# Patient Record
Sex: MALE | Race: WHITE | NOT HISPANIC OR LATINO | Employment: OTHER | URBAN - NONMETROPOLITAN AREA
[De-identification: names, ages, dates, MRNs, and addresses within clinical notes are randomized per-mention and may not be internally consistent; named-entity substitution may affect disease eponyms.]

---

## 2020-10-16 ENCOUNTER — OFFICE VISIT (OUTPATIENT)
Dept: INTERNAL MEDICINE | Facility: CLINIC | Age: 65
End: 2020-10-16

## 2020-10-16 VITALS
OXYGEN SATURATION: 99 % | DIASTOLIC BLOOD PRESSURE: 73 MMHG | SYSTOLIC BLOOD PRESSURE: 158 MMHG | TEMPERATURE: 97.5 F | HEIGHT: 73 IN | WEIGHT: 153.5 LBS | HEART RATE: 84 BPM | RESPIRATION RATE: 18 BRPM | BODY MASS INDEX: 20.34 KG/M2

## 2020-10-16 DIAGNOSIS — R03.0 ELEVATED BLOOD PRESSURE READING: ICD-10-CM

## 2020-10-16 DIAGNOSIS — Z13.29 SCREENING FOR ENDOCRINE, METABOLIC AND IMMUNITY DISORDER: ICD-10-CM

## 2020-10-16 DIAGNOSIS — E10.65 TYPE 1 DIABETES MELLITUS WITH HYPERGLYCEMIA (HCC): ICD-10-CM

## 2020-10-16 DIAGNOSIS — Z12.5 PROSTATE CANCER SCREENING: ICD-10-CM

## 2020-10-16 DIAGNOSIS — Z72.0 TOBACCO ABUSE: ICD-10-CM

## 2020-10-16 DIAGNOSIS — Z00.00 ANNUAL PHYSICAL EXAM: ICD-10-CM

## 2020-10-16 DIAGNOSIS — Z13.228 SCREENING FOR ENDOCRINE, METABOLIC AND IMMUNITY DISORDER: ICD-10-CM

## 2020-10-16 DIAGNOSIS — E78.2 MIXED HYPERLIPIDEMIA: ICD-10-CM

## 2020-10-16 DIAGNOSIS — Z13.0 SCREENING FOR ENDOCRINE, METABOLIC AND IMMUNITY DISORDER: ICD-10-CM

## 2020-10-16 DIAGNOSIS — J45.40 MODERATE PERSISTENT ASTHMA WITHOUT COMPLICATION: ICD-10-CM

## 2020-10-16 DIAGNOSIS — Z76.89 ENCOUNTER TO ESTABLISH CARE: Primary | ICD-10-CM

## 2020-10-16 DIAGNOSIS — Z13.0 SCREENING FOR DISORDER OF BLOOD AND BLOOD-FORMING ORGANS: ICD-10-CM

## 2020-10-16 DIAGNOSIS — Z11.59 ENCOUNTER FOR HEPATITIS C SCREENING TEST FOR LOW RISK PATIENT: ICD-10-CM

## 2020-10-16 DIAGNOSIS — G25.81 RESTLESS LEGS: ICD-10-CM

## 2020-10-16 DIAGNOSIS — E03.8 OTHER SPECIFIED HYPOTHYROIDISM: ICD-10-CM

## 2020-10-16 PROBLEM — E10.9 TYPE 1 DIABETES MELLITUS WITHOUT COMPLICATION (HCC): Status: ACTIVE | Noted: 2020-10-16

## 2020-10-16 PROBLEM — E55.9 VITAMIN D DEFICIENCY: Status: ACTIVE | Noted: 2020-10-16

## 2020-10-16 LAB — HBA1C MFR BLD: 8.9 %

## 2020-10-16 PROCEDURE — 99396 PREV VISIT EST AGE 40-64: CPT | Performed by: NURSE PRACTITIONER

## 2020-10-16 PROCEDURE — 83036 HEMOGLOBIN GLYCOSYLATED A1C: CPT | Performed by: NURSE PRACTITIONER

## 2020-10-16 RX ORDER — INSULIN DETEMIR 100 [IU]/ML
14 INJECTION, SOLUTION SUBCUTANEOUS 2 TIMES DAILY
Qty: 90 ML | Refills: 11 | Status: SHIPPED | OUTPATIENT
Start: 2020-10-16 | End: 2021-02-24 | Stop reason: SDUPTHER

## 2020-10-16 RX ORDER — ATORVASTATIN CALCIUM 10 MG/1
10 TABLET, FILM COATED ORAL NIGHTLY
Qty: 30 TABLET | Refills: 11 | Status: SHIPPED | OUTPATIENT
Start: 2020-10-16 | End: 2021-02-24 | Stop reason: SDUPTHER

## 2020-10-16 RX ORDER — LEVOTHYROXINE SODIUM 0.15 MG/1
150 TABLET ORAL DAILY
Qty: 30 TABLET | Refills: 11 | Status: SHIPPED | OUTPATIENT
Start: 2020-10-16 | End: 2020-10-19 | Stop reason: DRUGHIGH

## 2020-10-16 RX ORDER — INSULIN HUMAN 100 [IU]/ML
INJECTION, SOLUTION PARENTERAL
Qty: 10 EACH | Refills: 11 | Status: SHIPPED | OUTPATIENT
Start: 2020-10-16 | End: 2021-02-24 | Stop reason: SDUPTHER

## 2020-10-16 RX ORDER — INSULIN HUMAN 100 [IU]/ML
INJECTION, SOLUTION PARENTERAL
COMMUNITY
Start: 2020-08-23 | End: 2020-10-16 | Stop reason: SDUPTHER

## 2020-10-16 RX ORDER — BLOOD SUGAR DIAGNOSTIC
STRIP MISCELLANEOUS 4 TIMES DAILY
COMMUNITY
Start: 2020-09-21 | End: 2020-11-02 | Stop reason: SDUPTHER

## 2020-10-16 NOTE — PROGRESS NOTES
Date: 10/16/2020    Name: Rafael Bocanegra  : 1955    Chief Complaint:   Chief Complaint   Patient presents with   • Diabetes     Pt recently moved to [place] from [place] and needs someone to take over his diabetes care. He saw Dr. Donta Rivera in [place]. He checked his sugar about 4 times a day, stating his number go up and down but stays over 200       HPI:  Rafael Bocanegra is a 65 y.o. male presents to [facility] care, obtain annual physical.  He has recently moved from [place], [place].    T1DM:  Checks blood glucose 4 x day before eating.  Reports values have been up and down.  Takes levemir 12 units BID, humulin R SSI at meals. Reports glucose is labile, he has not been able to determine why it fluctuates.  Usually > 200.  Patient denies foot ulcerations, nausea, paresthesia of the feet, polydipsia, polyuria, polyphagia, visual disturbances and weight loss.   Asthma:  Uses albuterol inhaler once a week for wheezing.    Hyperlipidemia: takes Lipitor 10 mg at bedtime.  Does not eat a heart healthy diet, is sedentary.  Denies chest pain, palpitations, lower extremity edema, confusion, headaches, weakness, visual disturbances.   Hypothyroidism: Takes levothyroxine 200 mcg/day. Denies abnormal fatigue, weight change, temperature intolerance, hair/skin/nail changes, bowel habit changes, palpitations, anxiety, sore throat, hoarseness.   RLS: states legs have been jumpy HS for the past few months.  Keeps him awake.  Does not believe symptoms increase w/hyperglycemia    H/O alcoholism: quit drinking in .  Smokes 2 packs a day, for over 40 years.    Eye exam: up to date  Dental exam: edentulous    Diet: typical American diet, sister cooks for him  Physical activity: walks to the store and back, once a week.  About 1/4 mile.    History:    Past Medical History:   Diagnosis Date   • Arthritis    • Diabetes mellitus (CMS/HCC)    • Disease of thyroid gland    • Hyperlipidemia    • Hyperthyroidism        Past Surgical  History:   Procedure Laterality Date   • ARM DEBRIDEMENT      Broken glass removed from the right arm back around 1980s.       Family History   Problem Relation Age of Onset   • Cancer Mother         Cancer in her lymph nodes.    • Thyroid disease Sister        Social History     Socioeconomic History   • Marital status: Single     Spouse name: Not on file   • Number of children: Not on file   • Years of education: Not on file   • Highest education level: Not on file   Tobacco Use   • Smoking status: Current Every Day Smoker     Packs/day: 2.00     Years: 47.00     Pack years: 94.00     Types: Cigarettes   • Smokeless tobacco: Never Used   Substance and Sexual Activity   • Alcohol use: Never     Frequency: Never   • Drug use: Never       No Known Allergies      Current Outpatient Medications:   •  albuterol (PROAIR RESPICLICK) 108 (90 Base) MCG/ACT inhaler, Inhale 2 puffs Every 4 (Four) Hours As Needed for Wheezing or Shortness of Air., Disp: 1 inhaler, Rfl: 11  •  atorvastatin (LIPITOR) 10 MG tablet, Take 1 tablet by mouth Every Night., Disp: 30 tablet, Rfl: 11  •  HumuLIN R 100 UNIT/ML injection, Take 5 units before meals, take 1 unit for each 50 points over normal blood glucose, Disp: 10 each, Rfl: 11  •  insulin detemir (Levemir) 100 UNIT/ML injection, Inject 14 Units under the skin into the appropriate area as directed 2 (Two) Times a Day., Disp: 90 mL, Rfl: 11  •  OneTouch Ultra test strip, 4 (Four) Times a Day. use as directed, Disp: , Rfl:   •  vitamin D (ERGOCALCIFEROL) 1.25 MG (43881 UT) capsule capsule, Take 50,000 Units by mouth 1 (One) Time Per Week., Disp: , Rfl:   •  levothyroxine (Synthroid) 200 MCG tablet, Take 1 tablet by mouth Daily. To be taken when he wakes up, 30 minutes before other medications or eating, Disp: 90 tablet, Rfl: 0    ROS:  Review of Systems   All other systems reviewed and are negative.      VS:  Vitals:    10/16/20 1536   BP: 158/73   Pulse: 84   Resp: 18   Temp: 97.5 °F (36.4  "°C)   TempSrc: Temporal   SpO2: 99%   Weight: 69.6 kg (153 lb 8 oz)   Height: 185.9 cm (73.19\")     Body mass index is 20.15 kg/m².    PE:  Physical Exam  Constitutional:       Appearance: He is well-developed. He is not ill-appearing.   HENT:      Head: Normocephalic.      Right Ear: Tympanic membrane, ear canal and external ear normal.      Left Ear: Tympanic membrane, ear canal and external ear normal.      Nose: Nose normal.      Mouth/Throat:      Mouth: Mucous membranes are moist.      Pharynx: Oropharynx is clear. Uvula midline.   Eyes:      Extraocular Movements: Extraocular movements intact.      Conjunctiva/sclera: Conjunctivae normal.      Pupils: Pupils are equal, round, and reactive to light.   Neck:      Musculoskeletal: Full passive range of motion without pain, normal range of motion and neck supple.      Thyroid: No thyromegaly.      Vascular: No carotid bruit.   Cardiovascular:      Rate and Rhythm: Normal rate and regular rhythm.      Pulses: Normal pulses.      Heart sounds: Normal heart sounds.   Pulmonary:      Effort: Pulmonary effort is normal.      Breath sounds: Rhonchi (scattered) present.   Abdominal:      General: Bowel sounds are normal.      Palpations: Abdomen is soft.      Tenderness: There is no abdominal tenderness.   Musculoskeletal: Normal range of motion.         General: No tenderness or deformity.   Lymphadenopathy:      Cervical: No cervical adenopathy.   Skin:     General: Skin is warm.      Capillary Refill: Capillary refill takes less than 2 seconds.   Neurological:      Mental Status: He is alert and oriented to person, place, and time.      Sensory: No sensory deficit.      Coordination: Coordination normal.      Gait: Gait normal.      Comments: CN II-XII normal   Psychiatric:         Mood and Affect: Mood normal.         Speech: Speech normal.         Behavior: Behavior normal.         Thought Content: Thought content normal.       Assessment/Plan:   1. Healthy male " exam.   2. Patient Counseling: Including but not Limited to the following, when appropriate:  --Nutrition: Stressed importance of moderation in sodium/caffeine intake, saturated fat and cholesterol, caloric balance, sufficient intake of fresh fruits, vegetables, fiber  .--Discussed the issue of daily use of baby aspirin.  --Exercise: Stressed the importance of regular exercise.   --Substance Abuse: Discussed cessation/primary prevention of tobacco, alcohol, or other drug use; driving or other dangerous activities under the influence; availability of treatment for abuse, as indicated based on social history.    --Sexuality: Discussed sexually transmitted diseases, partner selection, use of condoms and contraceptive alternatives.   --Injury prevention: Discussed safety belts, safety helmets, smoke detector, smoking near bedding or upholstery.   --Dental health: Discussed importance of regular tooth brushing, flossing, and dental visits.  --Immunizations reviewed.  --Discussed benefits of colon cancer screening.  3. Discussed the patient's BMI with him.  The BMI is in the acceptable range  4. Return in about 3 months (around 1/16/2021) for Next scheduled follow up.  5. Age-appropriate Screening Scheduled  6. There are no Patient Instructions on file for this visit.    Diagnoses and all orders for this visit:    1. Encounter to establish care (Primary)    2. Annual physical exam    3. Type 1 diabetes mellitus with hyperglycemia (CMS/HCC)  -     POC Glycosylated Hemoglobin (Hb A1C)  -     HumuLIN R 100 UNIT/ML injection; Take 5 units before meals, take 1 unit for each 50 points over normal blood glucose  Dispense: 10 each; Refill: 11  -     insulin detemir (Levemir) 100 UNIT/ML injection; Inject 14 Units under the skin into the appropriate area as directed 2 (Two) Times a Day.  Dispense: 90 mL; Refill: 11  -     MicroAlbumin, Urine, Random - Urine, Clean Catch        - Follow diabetic diet        - Monitor blood sugars  as discussed, to better assess trends and glycemic response to certain food, drink, activities.  Advised fasting blood glucose should be < 130, 2 hours post-prandial should be < 180.  If they are persistently above these values, contact clinic for change to SSI or levemir        - See eye doctor annually or as discussed        - Wear protective foot wear/no bare feet        - Check feet regularly for calluses or ulcers        - Discussed risk of poorly controlled diabetes and long-term complications        - Exercise as tolerated for 30-45 minutes most days of the week. Gradually increase daily exercise if not currently active.        - Take all medications as prescribed    4. Moderate persistent asthma without complication  -     albuterol (PROAIR RESPICLICK) 108 (90 Base) MCG/ACT inhaler; Inhale 2 puffs Every 4 (Four) Hours As Needed for Wheezing or Shortness of Air.  Dispense: 1 inhaler; Refill: 11    5. Mixed hyperlipidemia  -     atorvastatin (LIPITOR) 10 MG tablet; Take 1 tablet by mouth Every Night.  Dispense: 30 tablet; Refill: 11  -     Lipid Panel        - Follow heart healthy diet.  Advised to reduce daily sodium intake to < 1500 mg per day.  Avoid processed & fast foods.        - Exercise as tolerated, with a goal of 30 minutes of moderate exercise most days.     6. Other specified hypothyroidism  -     levothyroxine (SYNTHROID, LEVOTHROID) 150 MCG tablet; Take 1 tablet by mouth Daily.  Dispense: 30 tablet; Refill: 11  -     T4, Free  -     TSH    7. Restless legs  -     Magnesium  - Advised this may be due to hyperglycemia.  Patient to monitor and see if symptoms improve with better glucose control    8. Elevated blood pressure reading        - Follow heart healthy diet.  Advised to reduce daily sodium intake to < 1500 mg per day.  Avoid processed & fast foods.        - Monitor blood pressure as discussed, keep log and bring to next appointment.  Patient states BP is normal at home, when checked.           - Exercise as tolerated, with a goal of 30 minutes of moderate exercise most days.          9. Encounter for hepatitis C screening test for low risk patient  -     Hepatitis C Antibody    10. Screening for disorder of blood and blood-forming organs  -     Comprehensive Metabolic Panel    11. Screening for endocrine, metabolic and immunity disorder  -     CBC & Differential    12. Prostate cancer screening  -     PSA SCREENING    13. Tobacco abuse       - Refused to discuss smoking cessation       Return in about 3 months (around 1/16/2021) for Next scheduled follow up.

## 2020-10-17 LAB
ALBUMIN SERPL-MCNC: 4.3 G/DL (ref 3.8–4.8)
ALBUMIN/GLOB SERPL: 2.2 {RATIO} (ref 1.2–2.2)
ALP SERPL-CCNC: 78 IU/L (ref 39–117)
ALT SERPL-CCNC: 13 IU/L (ref 0–44)
AST SERPL-CCNC: 19 IU/L (ref 0–40)
BASOPHILS # BLD AUTO: 0.1 X10E3/UL (ref 0–0.2)
BASOPHILS NFR BLD AUTO: 1 %
BILIRUB SERPL-MCNC: 0.3 MG/DL (ref 0–1.2)
BUN SERPL-MCNC: 9 MG/DL (ref 8–27)
BUN/CREAT SERPL: 11 (ref 10–24)
CALCIUM SERPL-MCNC: 8.8 MG/DL (ref 8.6–10.2)
CHLORIDE SERPL-SCNC: 89 MMOL/L (ref 96–106)
CHOLEST SERPL-MCNC: 228 MG/DL (ref 100–199)
CO2 SERPL-SCNC: 26 MMOL/L (ref 20–29)
CREAT SERPL-MCNC: 0.82 MG/DL (ref 0.76–1.27)
EOSINOPHIL # BLD AUTO: 0.3 X10E3/UL (ref 0–0.4)
EOSINOPHIL NFR BLD AUTO: 3 %
ERYTHROCYTE [DISTWIDTH] IN BLOOD BY AUTOMATED COUNT: 12.4 % (ref 11.6–15.4)
GLOBULIN SER CALC-MCNC: 2 G/DL (ref 1.5–4.5)
GLUCOSE SERPL-MCNC: 35 MG/DL (ref 65–99)
HCT VFR BLD AUTO: 44.5 % (ref 37.5–51)
HCV AB S/CO SERPL IA: <0.1 S/CO RATIO (ref 0–0.9)
HDLC SERPL-MCNC: 91 MG/DL
HGB BLD-MCNC: 15.3 G/DL (ref 13–17.7)
IMM GRANULOCYTES # BLD AUTO: 0 X10E3/UL (ref 0–0.1)
IMM GRANULOCYTES NFR BLD AUTO: 0 %
LDLC SERPL CALC-MCNC: 127 MG/DL (ref 0–99)
LYMPHOCYTES # BLD AUTO: 4.2 X10E3/UL (ref 0.7–3.1)
LYMPHOCYTES NFR BLD AUTO: 59 %
MAGNESIUM SERPL-MCNC: 1.9 MG/DL (ref 1.6–2.3)
MCH RBC QN AUTO: 31.4 PG (ref 26.6–33)
MCHC RBC AUTO-ENTMCNC: 34.4 G/DL (ref 31.5–35.7)
MCV RBC AUTO: 91 FL (ref 79–97)
MICROALBUMIN UR-MCNC: <3 UG/ML
MONOCYTES # BLD AUTO: 0.4 X10E3/UL (ref 0.1–0.9)
MONOCYTES NFR BLD AUTO: 5 %
NEUTROPHILS # BLD AUTO: 2.4 X10E3/UL (ref 1.4–7)
NEUTROPHILS NFR BLD AUTO: 32 %
PLATELET # BLD AUTO: 258 X10E3/UL (ref 150–450)
POTASSIUM SERPL-SCNC: 3.8 MMOL/L (ref 3.5–5.2)
PROT SERPL-MCNC: 6.3 G/DL (ref 6–8.5)
PSA SERPL-MCNC: 0.3 NG/ML (ref 0–4)
RBC # BLD AUTO: 4.88 X10E6/UL (ref 4.14–5.8)
SODIUM SERPL-SCNC: 127 MMOL/L (ref 134–144)
T4 FREE SERPL-MCNC: 0.11 NG/DL (ref 0.82–1.77)
TRIGL SERPL-MCNC: 56 MG/DL (ref 0–149)
TSH SERPL DL<=0.005 MIU/L-ACNC: 181 UIU/ML (ref 0.45–4.5)
VLDLC SERPL CALC-MCNC: 10 MG/DL (ref 5–40)
WBC # BLD AUTO: 7.4 X10E3/UL (ref 3.4–10.8)

## 2020-10-18 ENCOUNTER — TELEPHONE (OUTPATIENT)
Dept: INTERNAL MEDICINE | Facility: CLINIC | Age: 65
End: 2020-10-18

## 2020-10-18 NOTE — TELEPHONE ENCOUNTER
Tried to contact patient regarding abnormal labs.  No answer, left voicemail advising him labs are abnormal and he should go to the ED.  Will try to call again, later today.

## 2020-10-19 ENCOUNTER — TELEPHONE (OUTPATIENT)
Dept: INTERNAL MEDICINE | Facility: CLINIC | Age: 65
End: 2020-10-19

## 2020-10-19 DIAGNOSIS — E87.1 HYPONATREMIA: Primary | ICD-10-CM

## 2020-10-19 RX ORDER — LEVOTHYROXINE SODIUM 0.2 MG/1
200 TABLET ORAL DAILY
Qty: 90 TABLET | Refills: 0 | Status: SHIPPED | OUTPATIENT
Start: 2020-10-19 | End: 2021-02-24 | Stop reason: SDUPTHER

## 2020-10-19 NOTE — TELEPHONE ENCOUNTER
Talked to patient via phone.  He states he was unaware his glucose was that low.  He has been feeling fine.  No weakness, fatigue, muscle twitching, palpitations, shortness of breath, chest pain, confusion.  His TSH was 181, will increase levothyroxine to 200 mcg a day.  He does not take his medication separately from other medications and food.  Advised he must take it in the morning at least 30 minutes before other medications or food.  Patient verbalized understanding.  He will return to have metabolic panel redrawn.  Verbalized understanding that if he experiences any strange symptoms, weakness, palpitations, changes in personality, lethargy, confusion he should call 911 immediately.  Encouraged to tell his family to help him monitor for the symptoms.  Patient given the opportunity ask questions, verbalized understanding of lab work discussed.

## 2020-11-02 RX ORDER — BLOOD SUGAR DIAGNOSTIC
1 STRIP MISCELLANEOUS 4 TIMES DAILY
Qty: 200 EACH | Refills: 11 | Status: SHIPPED | OUTPATIENT
Start: 2020-11-02 | End: 2021-02-24 | Stop reason: SDUPTHER

## 2020-11-02 NOTE — TELEPHONE ENCOUNTER
Caller: Rafael Bocanegra    Relationship: Self    Best call back number: [phone]  Medication needed:   Requested Prescriptions     Pending Prescriptions Disp Refills   • OneTouch Ultra test strip        Si (Four) Times a Day. use as directed       When do you need the refill by: ASAP    What details did the patient provide when requesting the medication: REQUESTING A LARGER SUPPLY    Does the patient have less than a 3 day supply:  [x] Yes  [] No    What is the patient's preferred pharmacy:      [address] [phone] [facility] [phone]

## 2021-01-18 ENCOUNTER — OFFICE VISIT (OUTPATIENT)
Dept: INTERNAL MEDICINE | Facility: CLINIC | Age: 66
End: 2021-01-18

## 2021-01-18 VITALS
BODY MASS INDEX: 18.55 KG/M2 | WEIGHT: 140 LBS | HEART RATE: 109 BPM | DIASTOLIC BLOOD PRESSURE: 72 MMHG | TEMPERATURE: 97.3 F | OXYGEN SATURATION: 99 % | SYSTOLIC BLOOD PRESSURE: 118 MMHG | HEIGHT: 73 IN

## 2021-01-18 DIAGNOSIS — E03.8 OTHER SPECIFIED HYPOTHYROIDISM: ICD-10-CM

## 2021-01-18 DIAGNOSIS — E10.65 TYPE 1 DIABETES MELLITUS WITH HYPERGLYCEMIA (HCC): Primary | ICD-10-CM

## 2021-01-18 DIAGNOSIS — Z71.6 TOBACCO ABUSE COUNSELING: ICD-10-CM

## 2021-01-18 DIAGNOSIS — Z72.0 TOBACCO ABUSE: ICD-10-CM

## 2021-01-18 PROCEDURE — 99214 OFFICE O/P EST MOD 30 MIN: CPT | Performed by: NURSE PRACTITIONER

## 2021-01-18 PROCEDURE — 99406 BEHAV CHNG SMOKING 3-10 MIN: CPT | Performed by: NURSE PRACTITIONER

## 2021-01-18 RX ORDER — BUPROPION HYDROCHLORIDE 150 MG/1
150 TABLET ORAL DAILY
Qty: 30 TABLET | Refills: 1 | Status: SHIPPED | OUTPATIENT
Start: 2021-01-18 | End: 2021-02-24 | Stop reason: SDUPTHER

## 2021-01-18 NOTE — PROGRESS NOTES
"Date: 2021    Name: Rafael Bocanegra  : 1955    Chief Complaint:   Chief Complaint   Patient presents with   • Follow-up     3 month       HPI:  Rafael Bocanegra is a 66 y.o. male presents for follow up of abnormal thyroid labs, T1DM.  He takes Humulin R sliding scale with meals, Levemir as prescribed.  Reports blood sugars have been normal.  Low-carb diet. Patient denies foot ulcerations, nausea, paresthesia of the feet, polydipsia, polyuria, polyphagia, visual disturbances and weight loss.   Most recent , free T4 0.11.  History of hyperthyroidism, per patient. Denies abnormal fatigue, weight change, temperature intolerance, hair/skin/nail changes, bowel habit changes, palpitations, sore throat, hoarseness, tachycardia, increased appetite, nervousness/anxiety/irritability, tremor, sweating, insomnia.   Would like to quit smoking, currently smokes 1-2 packs a day.     History: The following portions of the patient's history were reviewed and updated as appropriate: allergies, current medications, past medical history, family history, surgical history, social history and problem list.        VS:  Vitals:    21 1723   BP: 118/72   Pulse: 109   Temp: 97.3 °F (36.3 °C)   TempSrc: Infrared   SpO2: 99%   Weight: 63.5 kg (140 lb)   Height: 185.9 cm (73.19\")     Body mass index is 18.37 kg/m².     PE:  Physical Exam  Constitutional:       Appearance: He is not ill-appearing.   HENT:      Head: Normocephalic.      Right Ear: External ear normal.      Left Ear: External ear normal.   Eyes:      Conjunctiva/sclera: Conjunctivae normal.      Pupils: Pupils are equal, round, and reactive to light.   Neck:      Musculoskeletal: Normal range of motion and neck supple.   Cardiovascular:      Rate and Rhythm: Normal rate and regular rhythm.      Pulses: Normal pulses.      Heart sounds: Normal heart sounds.   Pulmonary:      Breath sounds: Normal breath sounds.   Skin:     General: Skin is warm.      Capillary " Refill: Capillary refill takes less than 2 seconds.   Neurological:      Mental Status: He is alert and oriented to person, place, and time.      Coordination: Coordination normal.      Gait: Gait normal.   Psychiatric:         Attention and Perception: Attention normal.         Mood and Affect: Mood and affect normal.         Speech: Speech normal.         Behavior: Behavior normal.         Assessment/Plan:  Diagnoses and all orders for this visit:    1. Type 1 diabetes mellitus with hyperglycemia (CMS/HCC) (Primary)  -     Ambulatory Referral to Endocrinology        - Follow diabetic diet a no        - See eye doctor annually or as discussed        - Wear protective foot wear/no bare feet        - Check feet regularly for calluses or ulcers        - Discussed risk of poorly controlled diabetes and long-term complications        - Exercise as tolerated for 30-45 minutes most days of the week. Gradually increase daily exercise if not currently active.        - Take insulin as prescribed.    2. Other specified hypothyroidism  -     Ambulatory Referral to Endocrinology  - Monitor for palpitations, tachycardia, fever, diaphoresis, difficulty sleeping, anxiety, feeling jittery/anxious, tremors.  Should these occur return to clinic or go to emergency room depending on severity and timing of symptoms  3. Tobacco abuse  -     buPROPion XL (Wellbutrin XL) 150 MG 24 hr tablet; Take 1 tablet by mouth Daily.  Dispense: 30 tablet; Refill: 1  4. Tobacco abuse counseling  -     buPROPion XL (Wellbutrin XL) 150 MG 24 hr tablet; Take 1 tablet by mouth Daily.  Dispense: 30 tablet; Refill: 1  - I advised patient of the risks in continuing to use tobacco, and I provided this patient with smoking cessation educational materials, discussed how to quit smoking and patient has expressed willingness to quit.  I spent 6 minutes counseling patient.   - Encouraged to reach out to support groups, call 1 800 quit now as this has been shown to  increase success with smoking cessation    Return in about 4 weeks (around 2/15/2021) for Next scheduled follow up.

## 2021-02-24 ENCOUNTER — OFFICE VISIT (OUTPATIENT)
Dept: INTERNAL MEDICINE | Facility: CLINIC | Age: 66
End: 2021-02-24

## 2021-02-24 VITALS
SYSTOLIC BLOOD PRESSURE: 118 MMHG | HEIGHT: 73 IN | HEART RATE: 92 BPM | BODY MASS INDEX: 17.6 KG/M2 | WEIGHT: 132.8 LBS | DIASTOLIC BLOOD PRESSURE: 62 MMHG | TEMPERATURE: 98.4 F | OXYGEN SATURATION: 98 %

## 2021-02-24 DIAGNOSIS — Z72.0 TOBACCO ABUSE: ICD-10-CM

## 2021-02-24 DIAGNOSIS — J45.40 MODERATE PERSISTENT ASTHMA WITHOUT COMPLICATION: Primary | ICD-10-CM

## 2021-02-24 DIAGNOSIS — Z71.6 TOBACCO ABUSE COUNSELING: ICD-10-CM

## 2021-02-24 DIAGNOSIS — E78.2 MIXED HYPERLIPIDEMIA: ICD-10-CM

## 2021-02-24 DIAGNOSIS — E10.65 TYPE 1 DIABETES MELLITUS WITH HYPERGLYCEMIA (HCC): ICD-10-CM

## 2021-02-24 DIAGNOSIS — E87.1 HYPONATREMIA: ICD-10-CM

## 2021-02-24 DIAGNOSIS — E03.8 OTHER SPECIFIED HYPOTHYROIDISM: ICD-10-CM

## 2021-02-24 LAB — HBA1C MFR BLD: 8 %

## 2021-02-24 PROCEDURE — 99214 OFFICE O/P EST MOD 30 MIN: CPT | Performed by: NURSE PRACTITIONER

## 2021-02-24 PROCEDURE — 83036 HEMOGLOBIN GLYCOSYLATED A1C: CPT | Performed by: NURSE PRACTITIONER

## 2021-02-24 PROCEDURE — 99406 BEHAV CHNG SMOKING 3-10 MIN: CPT | Performed by: NURSE PRACTITIONER

## 2021-02-24 RX ORDER — BUPROPION HYDROCHLORIDE 150 MG/1
150 TABLET ORAL DAILY
Qty: 30 TABLET | Refills: 1 | Status: SHIPPED | OUTPATIENT
Start: 2021-02-24 | End: 2021-05-25 | Stop reason: SDUPTHER

## 2021-02-24 RX ORDER — INSULIN HUMAN 100 [IU]/ML
INJECTION, SOLUTION PARENTERAL
Qty: 10 EACH | Refills: 11 | Status: SHIPPED | OUTPATIENT
Start: 2021-02-24 | End: 2021-06-29 | Stop reason: SDUPTHER

## 2021-02-24 RX ORDER — BLOOD SUGAR DIAGNOSTIC
1 STRIP MISCELLANEOUS 4 TIMES DAILY
Qty: 200 EACH | Refills: 11 | Status: SHIPPED | OUTPATIENT
Start: 2021-02-24 | End: 2021-05-25 | Stop reason: SDUPTHER

## 2021-02-24 RX ORDER — INSULIN DETEMIR 100 [IU]/ML
14 INJECTION, SOLUTION SUBCUTANEOUS 2 TIMES DAILY
Qty: 90 ML | Refills: 11 | Status: SHIPPED | OUTPATIENT
Start: 2021-02-24 | End: 2021-05-25 | Stop reason: SDUPTHER

## 2021-02-24 RX ORDER — ATORVASTATIN CALCIUM 10 MG/1
10 TABLET, FILM COATED ORAL NIGHTLY
Qty: 30 TABLET | Refills: 11 | Status: SHIPPED | OUTPATIENT
Start: 2021-02-24 | End: 2021-12-22 | Stop reason: SDUPTHER

## 2021-02-24 RX ORDER — LEVOTHYROXINE SODIUM 0.2 MG/1
200 TABLET ORAL DAILY
Qty: 90 TABLET | Refills: 0 | Status: SHIPPED | OUTPATIENT
Start: 2021-02-24 | End: 2021-07-16 | Stop reason: DRUGHIGH

## 2021-02-24 NOTE — PROGRESS NOTES
Date: 2021    Name: Rafael Bocanegra  : 1955    Chief Complaint:   Chief Complaint   Patient presents with   • Med Refill     1 month follow up       HPI:  Rafael Bocanegra is a 66 y.o. male presents for follow up of several chronic conditions, including T1DM, HL, hypothyroid, asthma.  He expresses interest in smoking cessation, as well. He lives with his sister and nephew.  They stay at home, except to go to the grocery for necessary items.  Appointment scheduled in April w/endocrinology. His address is incorrect in his demographics, has not received new patient paperwork.       T1DM:  Takes levemir 14 units BID, humulin R 5 units/SSI with meals as prescribed.  Checks glucose in the morning and before eating.  Reports glucose is normally 100-250, but occasionally feels shaky, sweaty and eats some peanut butter which improves hypoglycemic symptoms.  Has not been able to determine specific food or beverage that causes increased blood glucose. He occasionally has numbness and tingling after extended periods of walking, below the knee in both legs.  This does not bother him.  He has seen podiatry in the past, but not in the past year. Patient denies foot ulcerations, nausea, polydipsia, polyuria, polyphagia, visual disturbances.  He has lost weight without change in diet.  Reports this has been due to thyroid imbalances in the past.  Last A1c 8.9, in 2020.      Hypothyroid: takes levothyroxine 200 mcg daily, 30 minutes prior to other medications and eating.  He is always cold, has been more cold than others as long as he can remember.  Denies abnormal fatigue, hair/skin/nail changes, bowel habit changes, palpitations, anxiety, sore throat, hoarseness.       HL: takes atorvastatin 10 mg daily.  Does not eat a heart healthy diet.  No formal exercise program.  Denies chest pain, dyspnea, orthopnea, palpitations, lower extremity edema, confusion, headaches, weakness, visual disturbances.    Smoking,  "asthma:  Continues to smoke 1-1.5 ppd.  He expresses a desire to quit.  Has been prescribed wellbutrin in the past, has not taken it.  He uses albuterol inhaler 1-2 x a week.  No cough.     History: The following portions of the patient's history were reviewed and updated as appropriate: allergies, current medications, past medical history, family history, surgical history, social history and problem list.      VS:  Vitals:    02/24/21 1015   BP: 118/62   Pulse: 92   Temp: 98.4 °F (36.9 °C)   TempSrc: Infrared   SpO2: 98%   Weight: 60.2 kg (132 lb 12.8 oz)   Height: 185.9 cm (73.19\")     Body mass index is 17.43 kg/m².  PE:  Physical Exam  Constitutional:       Appearance: He is not ill-appearing.   HENT:      Head: Normocephalic.      Right Ear: External ear normal.      Left Ear: External ear normal.   Eyes:      Conjunctiva/sclera: Conjunctivae normal.      Pupils: Pupils are equal, round, and reactive to light.   Neck:      Musculoskeletal: Normal range of motion and neck supple.   Cardiovascular:      Rate and Rhythm: Normal rate and regular rhythm.      Pulses: Normal pulses.      Heart sounds: Normal heart sounds.   Pulmonary:      Effort: Pulmonary effort is normal.      Breath sounds: Normal breath sounds.   Skin:     General: Skin is warm.      Capillary Refill: Capillary refill takes less than 2 seconds.   Neurological:      Mental Status: He is alert and oriented to person, place, and time.      Coordination: Coordination normal.      Gait: Gait normal.      Comments: Intention tremors   Psychiatric:         Attention and Perception: Attention normal.         Mood and Affect: Mood and affect normal.         Speech: Speech normal.         Behavior: Behavior normal.       Office Visit on 02/24/2021   Component Date Value Ref Range Status   • Hemoglobin A1C 02/24/2021 8.0  % Final        Assessment/Plan:  Diagnoses and all orders for this visit:    1. Moderate persistent asthma without complication " (Primary)         -  Continue to use albuterol inhaler, prn SOA, wheezing          -  If frequency of use increases, will contact clinic or return to clinic for further evaluation.  2. Type 1 diabetes mellitus with hyperglycemia (CMS/[facility])  -     insulin detemir (Levemir) 100 UNIT/ML injection; Inject 14 Units under the skin into the appropriate area as directed 2 (Two) Times a Day.  Dispense: 90 mL; Refill: 11  -     HumuLIN R 100 UNIT/ML injection; Take 5 units before meals, take 1 unit for each 50 points over normal blood glucose  Dispense: 10 each; Refill: 11  -     OneTouch Ultra test strip; 1 each by Other route 4 (Four) Times a Day. use as directed  Dispense: 200 each; Refill: 11  -     POC Glycosylated Hemoglobin (Hb A1C)  -     Ambulatory Referral to Podiatry  -     Ambulatory Referral for Diabetic Eye Exam-Ophthalmology. Advised patient he will have to make his own appointment, dilated eye exam should be done once a year.  - Keep appointment with endocrinology.  Given phone number to call and ask that new patient packet be sent to correct address.            - Follow diabetic diet        - Continue to monitor glucose.  Fasting should be < 130.  Advised to keep record of blood glucose to take to endocrinology.          - Wear protective foot wear/no bare feet        - Check feet regularly for calluses or ulcers        - Discussed risk of poorly controlled diabetes and long-term complications        - Exercise as tolerated for 30-45 minutes most days of the week. Gradually increase daily exercise if not currently active.        - Take all medications as prescribed  3. Tobacco abuse  -     buPROPion XL (Wellbutrin XL) 150 MG 24 hr tablet; Take 1 tablet by mouth Daily.  Dispense: 30 tablet; Refill: 1.   Start taking 2 weeks prior to quit date.  He plans to quit in the spring, as he will be able to go fishing to help him relax/not want to smoke.   4. Tobacco abuse counseling  -     buPROPion XL (Wellbutrin XL)  150 MG 24 hr tablet; Take 1 tablet by mouth Daily.  Dispense: 30 tablet; Refill: 1  - I advised patient of the risks in continuing to use tobacco, and I provided this patient with smoking cessation educational materials, discussed how to quit smoking and patient has expressed willingness to quit.  I spent 6 minutes counseling patient.    5. Mixed hyperlipidemia  -     atorvastatin (LIPITOR) 10 MG tablet; Take 1 tablet by mouth Every Night.  Dispense: 30 tablet; Refill: 11        - Follow heart healthy diet.  Avoid processed & fast foods.  6. Hyponatremia  -     Comprehensive Metabolic Panel  7. Other specified hypothyroidism  -     T4, Free  -     TSH  -     levothyroxine (Synthroid) 200 MCG tablet; Take 1 tablet by mouth Daily. To be taken when he wakes up, 30 minutes before other medications or eating  Dispense: 90 tablet; Refill: 0        Return in about 3 months (around 5/24/2021) for Next scheduled follow up.

## 2021-02-25 LAB
ALBUMIN SERPL-MCNC: 4.3 G/DL (ref 3.5–5.2)
ALBUMIN/GLOB SERPL: 2.7 G/DL
ALP SERPL-CCNC: 82 U/L (ref 39–117)
ALT SERPL-CCNC: 9 U/L (ref 1–41)
AST SERPL-CCNC: 13 U/L (ref 1–40)
BILIRUB SERPL-MCNC: 0.3 MG/DL (ref 0–1.2)
BUN SERPL-MCNC: 8 MG/DL (ref 8–23)
BUN/CREAT SERPL: 12.5 (ref 7–25)
CALCIUM SERPL-MCNC: 9.5 MG/DL (ref 8.6–10.5)
CHLORIDE SERPL-SCNC: 94 MMOL/L (ref 98–107)
CO2 SERPL-SCNC: 27.8 MMOL/L (ref 22–29)
CREAT SERPL-MCNC: 0.64 MG/DL (ref 0.76–1.27)
GLOBULIN SER CALC-MCNC: 1.6 GM/DL
GLUCOSE SERPL-MCNC: 289 MG/DL (ref 65–99)
POTASSIUM SERPL-SCNC: 5.2 MMOL/L (ref 3.5–5.2)
PROT SERPL-MCNC: 5.9 G/DL (ref 6–8.5)
SODIUM SERPL-SCNC: 131 MMOL/L (ref 136–145)
T4 FREE SERPL-MCNC: 0.82 NG/DL (ref 0.93–1.7)
TSH SERPL DL<=0.005 MIU/L-ACNC: 0.06 UIU/ML (ref 0.27–4.2)

## 2021-03-03 ENCOUNTER — PRIOR AUTHORIZATION (OUTPATIENT)
Dept: INTERNAL MEDICINE | Facility: CLINIC | Age: 66
End: 2021-03-03

## 2021-03-03 DIAGNOSIS — J45.40 MODERATE PERSISTENT ASTHMA WITHOUT COMPLICATION: ICD-10-CM

## 2021-03-08 DIAGNOSIS — J45.40 MODERATE PERSISTENT ASTHMA WITHOUT COMPLICATION: Primary | ICD-10-CM

## 2021-03-08 RX ORDER — ALBUTEROL SULFATE 90 UG/1
2 AEROSOL, METERED RESPIRATORY (INHALATION) EVERY 4 HOURS PRN
Qty: 18 G | Refills: 11 | Status: SHIPPED | OUTPATIENT
Start: 2021-03-08 | End: 2022-08-24 | Stop reason: SDUPTHER

## 2021-05-25 ENCOUNTER — OFFICE VISIT (OUTPATIENT)
Dept: INTERNAL MEDICINE | Facility: CLINIC | Age: 66
End: 2021-05-25

## 2021-05-25 VITALS
DIASTOLIC BLOOD PRESSURE: 49 MMHG | WEIGHT: 128.9 LBS | OXYGEN SATURATION: 100 % | SYSTOLIC BLOOD PRESSURE: 98 MMHG | HEIGHT: 73 IN | BODY MASS INDEX: 17.08 KG/M2 | HEART RATE: 85 BPM | TEMPERATURE: 97.5 F

## 2021-05-25 DIAGNOSIS — E87.1 HYPONATREMIA: ICD-10-CM

## 2021-05-25 DIAGNOSIS — J45.40 MODERATE PERSISTENT ASTHMA WITHOUT COMPLICATION: ICD-10-CM

## 2021-05-25 DIAGNOSIS — Z71.6 TOBACCO ABUSE COUNSELING: ICD-10-CM

## 2021-05-25 DIAGNOSIS — E10.65 TYPE 1 DIABETES MELLITUS WITH HYPERGLYCEMIA (HCC): Primary | ICD-10-CM

## 2021-05-25 DIAGNOSIS — E03.8 OTHER SPECIFIED HYPOTHYROIDISM: ICD-10-CM

## 2021-05-25 DIAGNOSIS — Z72.0 TOBACCO ABUSE: ICD-10-CM

## 2021-05-25 PROCEDURE — 99214 OFFICE O/P EST MOD 30 MIN: CPT | Performed by: NURSE PRACTITIONER

## 2021-05-25 PROCEDURE — 99406 BEHAV CHNG SMOKING 3-10 MIN: CPT | Performed by: NURSE PRACTITIONER

## 2021-05-25 RX ORDER — BLOOD SUGAR DIAGNOSTIC
1 STRIP MISCELLANEOUS 4 TIMES DAILY
Qty: 200 EACH | Refills: 11 | Status: SHIPPED | OUTPATIENT
Start: 2021-05-25 | End: 2022-05-02 | Stop reason: SDUPTHER

## 2021-05-25 RX ORDER — INSULIN DETEMIR 100 [IU]/ML
16 INJECTION, SOLUTION SUBCUTANEOUS 2 TIMES DAILY
Qty: 90 ML | Refills: 11 | Status: SHIPPED | OUTPATIENT
Start: 2021-05-25 | End: 2021-06-29 | Stop reason: SDUPTHER

## 2021-05-25 RX ORDER — BUPROPION HYDROCHLORIDE 150 MG/1
150 TABLET ORAL DAILY
Qty: 30 TABLET | Refills: 1 | Status: SHIPPED | OUTPATIENT
Start: 2021-05-25 | End: 2021-09-03

## 2021-05-25 NOTE — PROGRESS NOTES
"Date: 2021    Name: Rafael Bocanegra  : 1955    Chief Complaint:   Chief Complaint   Patient presents with   • Med Refill     3 month f/u       HPI:  Rafael Bocanegra is a 66 y.o. male presents with requests for medication refills.  He was referred to endocrinology, missed appointment.  Says he lost the paperwork.  He continues to take SSI with meals, detemir 14 units BID.  Reports he has noted high blood sugars, usually in the 200's; once > 400.  He has lost weight since last visit.  Endorses good appetite, eats 3 meals a day.  Does admit meals are not always low carb, heart healthy.  No changes in bowel habit.  He has hypothyroidism, not currently taking levothyroxine.  Last labs drawn 3 months ago, A1c 8, TSH 00.59. CMP abnormal with low creatinine, sodium, protein, chloride.  Patient denies foot ulcerations, nausea, paresthesia of the feet, polydipsia, polyuria, polyphagia, visual disturbances, abnormal fatigue, temperature intolerance, hair/skin/nail changes, bowel habit changes, palpitations, anxiety, sore throat, hoarseness. Has seen podiatrist, who advised he does not have neuropathy in his feet.  He has diabetic shoes, not wearing them today.  He is \"breaking them in\".    Continue to smoke, usually > 1ppd.  Has not taken bupropion  mg, as prescribed for smoking cessation.  Would like to start taking it now.  He has not used albuterol inhaler in over a month.      History:  The following portions of the patient's history were reviewed and updated as appropriate: allergies, current medications, past medical history, family history, surgical history, social history and problem list.     VS:  Vitals:    21 0859   BP: 98/49   Pulse: 85   Temp: 97.5 °F (36.4 °C)   TempSrc: Infrared   SpO2: 100%   Weight: 58.5 kg (128 lb 14.4 oz)   Height: 185.9 cm (73.19\")     Body mass index is 16.92 kg/m².    PE:  Physical Exam  Constitutional:       Appearance: He is underweight.   HENT:      Head: " Normocephalic.      Right Ear: External ear normal.      Left Ear: External ear normal.   Eyes:      Conjunctiva/sclera: Conjunctivae normal.      Pupils: Pupils are equal, round, and reactive to light.   Neck:      Thyroid: No thyroid mass, thyromegaly or thyroid tenderness.   Cardiovascular:      Rate and Rhythm: Normal rate and regular rhythm.      Pulses: Normal pulses.      Heart sounds: Normal heart sounds.   Pulmonary:      Effort: Pulmonary effort is normal.      Breath sounds: Normal breath sounds.   Musculoskeletal:      Cervical back: Normal range of motion and neck supple.   Skin:     General: Skin is warm.      Capillary Refill: Capillary refill takes less than 2 seconds.   Neurological:      Mental Status: He is alert and oriented to person, place, and time.      Coordination: Coordination normal.      Gait: Gait normal.   Psychiatric:         Attention and Perception: Attention normal.         Mood and Affect: Mood and affect normal.         Speech: Speech normal.         Behavior: Behavior normal.       Assessment/Plan:  Diagnoses and all orders for this visit:    1. Type 1 diabetes mellitus with hyperglycemia (CMS/[facility]) (Primary)  -     OneTouch Ultra test strip; 1 each by Other route 4 (Four) Times a Day. E11.9 provide what is covered by ins  Dispense: 200 each; Refill: 11  -     insulin detemir (Levemir) 100 UNIT/ML injection; Inject 16 Units under the skin into the appropriate area as directed 2 (Two) Times a Day.  Dispense: 90 mL; Refill: 11  -     Hemoglobin A1c  - Given phone number, address of endocrinologist to make an appointment.  He is going to call today to make appointment.         - Follow diabetic diet        - Continue to monitor blood glucose, take meter/readings to endocrinology appointment        - See eye doctor annually or as discussed        - Wear protective foot wear/no bare feet        - Check feet regularly for calluses or ulcers        - Discussed risk of poorly controlled  diabetes and long-term complications        - Exercise as tolerated for 30-45 minutes most days of the week. Gradually increase daily exercise if not currently active.        - Take all medications as prescribed    2. Other specified hypothyroidism  -     TSH.   - To call endocrinology to schedule appointment    3. Tobacco abuse  -     buPROPion XL (Wellbutrin XL) 150 MG 24 hr tablet; Take 1 tablet by mouth Daily.  Dispense: 30 tablet; Refill: 1. Advised this can cause weight loss.  He verbalizes understanding, agrees to stop taking if he loses more weight.      4. Tobacco abuse counseling  -     buPROPion XL (Wellbutrin XL) 150 MG 24 hr tablet; Take 1 tablet by mouth Daily.  Dispense: 30 tablet; Refill: 1  - I advised patient of the risks in continuing to use tobacco, and I provided this patient with smoking cessation educational materials, discussed how to quit smoking and patient has expressed willingness to quit.  I spent 4 minutes counseling patient.      5. Moderate persistent asthma without complication        - Continue using albuterol inhaler as prescribed    6. Hyponatremia  -     Comprehensive Metabolic Panel    7. Weight loss        - TSH, A1c, stop smoking.       Return in about 5 months (around 10/25/2021) for Annual.

## 2021-05-26 LAB
ALBUMIN SERPL-MCNC: 4.1 G/DL (ref 3.5–5.2)
ALBUMIN/GLOB SERPL: 2.6 G/DL
ALP SERPL-CCNC: 90 U/L (ref 39–117)
ALT SERPL-CCNC: 7 U/L (ref 1–41)
AST SERPL-CCNC: 10 U/L (ref 1–40)
BILIRUB SERPL-MCNC: 0.4 MG/DL (ref 0–1.2)
BUN SERPL-MCNC: 4 MG/DL (ref 8–23)
BUN/CREAT SERPL: 5.9 (ref 7–25)
CALCIUM SERPL-MCNC: 9.4 MG/DL (ref 8.6–10.5)
CHLORIDE SERPL-SCNC: 95 MMOL/L (ref 98–107)
CO2 SERPL-SCNC: 27.4 MMOL/L (ref 22–29)
CREAT SERPL-MCNC: 0.68 MG/DL (ref 0.76–1.27)
GLOBULIN SER CALC-MCNC: 1.6 GM/DL
GLUCOSE SERPL-MCNC: 328 MG/DL (ref 65–99)
HBA1C MFR BLD: 8.5 % (ref 4.8–5.6)
POTASSIUM SERPL-SCNC: 4.9 MMOL/L (ref 3.5–5.2)
PROT SERPL-MCNC: 5.7 G/DL (ref 6–8.5)
SODIUM SERPL-SCNC: 131 MMOL/L (ref 136–145)
TSH SERPL DL<=0.005 MIU/L-ACNC: 0.01 UIU/ML (ref 0.27–4.2)

## 2021-06-29 DIAGNOSIS — E10.65 TYPE 1 DIABETES MELLITUS WITH HYPERGLYCEMIA (HCC): ICD-10-CM

## 2021-06-29 RX ORDER — INSULIN HUMAN 100 [IU]/ML
INJECTION, SOLUTION PARENTERAL
Qty: 10 EACH | Refills: 11 | Status: SHIPPED | OUTPATIENT
Start: 2021-06-29 | End: 2021-09-02 | Stop reason: SDUPTHER

## 2021-06-29 RX ORDER — INSULIN DETEMIR 100 [IU]/ML
16 INJECTION, SOLUTION SUBCUTANEOUS 2 TIMES DAILY
Qty: 90 ML | Refills: 11 | Status: SHIPPED | OUTPATIENT
Start: 2021-06-29 | End: 2021-09-02

## 2021-06-29 NOTE — TELEPHONE ENCOUNTER
Caller: Rafael Bocanegra    Relationship: Self    Best call back number: [phone]  Medication needed:   Requested Prescriptions     Pending Prescriptions Disp Refills   • HumuLIN R 100 UNIT/ML injection 10 each 11     Sig: Take 5 units before meals, take 1 unit for each 50 points over normal blood glucose   • insulin detemir (Levemir) 100 UNIT/ML injection 90 mL 11     Sig: Inject 16 Units under the skin into the appropriate area as directed 2 (Two) Times a Day.       When do you need the refill by: ASAP  What additional details did the patient provide when requesting the medication: N/A    Does the patient have less than a 3 day supply:  [x] Yes  [] No    What is the patient's preferred pharmacy: [facility] [address] [place] [address] [phone] [facility] [phone]

## 2021-07-15 NOTE — TELEPHONE ENCOUNTER
Last visit:5/25/2021  Next appointment:  Abnormal TSH in may 2021  appt with endocrinology 09/02/2021

## 2021-07-16 RX ORDER — LEVOTHYROXINE SODIUM 0.2 MG/1
TABLET ORAL
Qty: 90 TABLET | Refills: 0 | OUTPATIENT
Start: 2021-07-16

## 2021-07-16 RX ORDER — LEVOTHYROXINE SODIUM 0.15 MG/1
150 TABLET ORAL DAILY
Qty: 30 TABLET | Refills: 1 | Status: SHIPPED | OUTPATIENT
Start: 2021-07-16 | End: 2021-09-07 | Stop reason: DRUGHIGH

## 2021-07-16 NOTE — TELEPHONE ENCOUNTER
Dose needs to be adjusted.  Sending levothyroxine 150 mcg daily, same administration instructions.

## 2021-09-02 ENCOUNTER — OFFICE VISIT (OUTPATIENT)
Dept: DIABETES SERVICES | Facility: HOSPITAL | Age: 66
End: 2021-09-02

## 2021-09-02 ENCOUNTER — LAB (OUTPATIENT)
Dept: LAB | Facility: HOSPITAL | Age: 66
End: 2021-09-02

## 2021-09-02 ENCOUNTER — OFFICE VISIT (OUTPATIENT)
Dept: ENDOCRINOLOGY | Facility: CLINIC | Age: 66
End: 2021-09-02

## 2021-09-02 VITALS
DIASTOLIC BLOOD PRESSURE: 60 MMHG | WEIGHT: 135 LBS | OXYGEN SATURATION: 98 % | HEART RATE: 80 BPM | SYSTOLIC BLOOD PRESSURE: 122 MMHG | HEIGHT: 73 IN | BODY MASS INDEX: 17.89 KG/M2

## 2021-09-02 DIAGNOSIS — E10.65 TYPE 1 DIABETES MELLITUS WITH HYPERGLYCEMIA (HCC): ICD-10-CM

## 2021-09-02 DIAGNOSIS — E03.9 ACQUIRED HYPOTHYROIDISM: ICD-10-CM

## 2021-09-02 DIAGNOSIS — E10.65 UNCONTROLLED TYPE 1 DIABETES MELLITUS WITH HYPERGLYCEMIA (HCC): Primary | ICD-10-CM

## 2021-09-02 DIAGNOSIS — E10.65 UNCONTROLLED TYPE 1 DIABETES MELLITUS WITH HYPERGLYCEMIA (HCC): ICD-10-CM

## 2021-09-02 LAB
ALBUMIN SERPL-MCNC: 4.5 G/DL (ref 3.5–5.2)
ALBUMIN UR-MCNC: <1.2 MG/DL
ALBUMIN/GLOB SERPL: 2 G/DL
ALP SERPL-CCNC: 105 U/L (ref 39–117)
ALT SERPL W P-5'-P-CCNC: 28 U/L (ref 1–41)
ANION GAP SERPL CALCULATED.3IONS-SCNC: 7.5 MMOL/L (ref 5–15)
AST SERPL-CCNC: 24 U/L (ref 1–40)
BILIRUB SERPL-MCNC: 0.3 MG/DL (ref 0–1.2)
BUN SERPL-MCNC: 5 MG/DL (ref 8–23)
BUN/CREAT SERPL: 6.1 (ref 7–25)
CALCIUM SPEC-SCNC: 9.3 MG/DL (ref 8.6–10.5)
CHLORIDE SERPL-SCNC: 90 MMOL/L (ref 98–107)
CHOLEST SERPL-MCNC: 135 MG/DL (ref 0–200)
CO2 SERPL-SCNC: 30.5 MMOL/L (ref 22–29)
CREAT SERPL-MCNC: 0.82 MG/DL (ref 0.76–1.27)
CREAT UR-MCNC: 31 MG/DL
EXPIRATION DATE: ABNORMAL
EXPIRATION DATE: NORMAL
GFR SERPL CREATININE-BSD FRML MDRD: 95 ML/MIN/1.73
GLOBULIN UR ELPH-MCNC: 2.2 GM/DL
GLUCOSE BLDC GLUCOMTR-MCNC: 235 MG/DL (ref 70–130)
GLUCOSE SERPL-MCNC: 178 MG/DL (ref 65–99)
HBA1C MFR BLD: 8.8 %
HDLC SERPL-MCNC: 67 MG/DL (ref 40–60)
LDLC SERPL CALC-MCNC: 58 MG/DL (ref 0–100)
LDLC/HDLC SERPL: 0.88 {RATIO}
Lab: ABNORMAL
Lab: NORMAL
MICROALBUMIN/CREAT UR: NORMAL MG/G{CREAT}
POTASSIUM SERPL-SCNC: 4.4 MMOL/L (ref 3.5–5.2)
PROT SERPL-MCNC: 6.7 G/DL (ref 6–8.5)
SODIUM SERPL-SCNC: 128 MMOL/L (ref 136–145)
TRIGL SERPL-MCNC: 44 MG/DL (ref 0–150)
TSH SERPL DL<=0.05 MIU/L-ACNC: 45.7 UIU/ML (ref 0.27–4.2)
VLDLC SERPL-MCNC: 10 MG/DL (ref 5–40)

## 2021-09-02 PROCEDURE — 99204 OFFICE O/P NEW MOD 45 MIN: CPT | Performed by: INTERNAL MEDICINE

## 2021-09-02 PROCEDURE — 84443 ASSAY THYROID STIM HORMONE: CPT

## 2021-09-02 PROCEDURE — 82570 ASSAY OF URINE CREATININE: CPT

## 2021-09-02 PROCEDURE — 82947 ASSAY GLUCOSE BLOOD QUANT: CPT | Performed by: INTERNAL MEDICINE

## 2021-09-02 PROCEDURE — G0108 DIAB MANAGE TRN  PER INDIV: HCPCS | Performed by: REGISTERED NURSE

## 2021-09-02 PROCEDURE — 80061 LIPID PANEL: CPT

## 2021-09-02 PROCEDURE — 80053 COMPREHEN METABOLIC PANEL: CPT

## 2021-09-02 PROCEDURE — 82043 UR ALBUMIN QUANTITATIVE: CPT

## 2021-09-02 RX ORDER — INSULIN DETEMIR 100 [IU]/ML
14 INJECTION, SOLUTION SUBCUTANEOUS 2 TIMES DAILY
Qty: 30 ML | Refills: 3 | Status: SHIPPED | OUTPATIENT
Start: 2021-09-02 | End: 2022-01-14 | Stop reason: SDUPTHER

## 2021-09-02 RX ORDER — ASPIRIN 81 MG/1
81 TABLET ORAL DAILY
COMMUNITY
End: 2022-08-24 | Stop reason: SDUPTHER

## 2021-09-02 RX ORDER — INSULIN HUMAN 100 [IU]/ML
INJECTION, SOLUTION PARENTERAL
Qty: 30 ML | Refills: 3 | Status: SHIPPED | OUTPATIENT
Start: 2021-09-02 | End: 2022-08-24

## 2021-09-02 NOTE — ASSESSMENT & PLAN NOTE
Diabetes is worsening.   Take R insulin before meals based on CHO counting instead of sliding scale.  Diabetes will be reassessed in 3 months.    Refer for DM education.  Check labs today.

## 2021-09-02 NOTE — PROGRESS NOTES
Office Note      Date: 2021  Patient Name: Rafael Bocanegra  MRN: [id number]  : 1955    Chief Complaint   Patient presents with   • Diabetes   • Hypothyroidism       History of Present Illness:   Rafael Bocanegra is a 66 y.o. male who presents for Diabetes type 1. Diagnosed in: . Treated in past with insulin. Current treatments: basal bolus insulin. Number of insulin shots per day: 4. Checks blood sugar >4 times a day. Has low blood sugar: frequent. Aspirin use: Yes. Statin use: Yes. ACE-I/ARB use: No -  .  Last eye exam:     He has h/o hypothyrodism.  TSH was 181 last .  He is currently on T4 150mcg qd.  He had TSH done 2021 that was low at 0.015.  He is taking the T4 correctly.  He isn't taking any interfering meds concurrently.  He denies any goiter.  He hasn't noted any change in the size of his neck.  He denies any compressive sxs.  He notes weight loss.  He notes some nervouseness.    Subjective      Diabetic Complications:  Eyes: No  Kidneys: No  Feet: No  Heart: No    Diet and Exercise:  Meals per day: 3  Minutes of exercise per week: 0 mins.    Review of Systems:   Review of Systems   Constitutional: Positive for unexpected weight change.   HENT: Negative.    Eyes: Negative.    Respiratory: Positive for cough.    Cardiovascular: Negative.    Gastrointestinal: Negative.    Endocrine: Positive for polyphagia and polyuria.   Genitourinary: Negative.    Musculoskeletal: Positive for back pain and gait problem.   Skin: Negative.    Allergic/Immunologic: Negative.    Neurological: Positive for light-headedness and numbness.   Hematological: Negative.    Psychiatric/Behavioral: The patient is nervous/anxious.        The following portions of the patient's history were reviewed and updated as appropriate: allergies, current medications, past family history, past medical history, past social history, past surgical history and problem list.    Objective     Visit Vitals  /60   Pulse  "80   Ht 185.9 cm (73.19\")   Wt 61.2 kg (135 lb)   SpO2 98%   BMI 17.72 kg/m²       Physical Exam:  Physical Exam  Constitutional:       Appearance: Normal appearance.   HENT:      Head: Normocephalic and atraumatic.   Eyes:      Extraocular Movements: Extraocular movements intact.      Conjunctiva/sclera: Conjunctivae normal.      Pupils: Pupils are equal, round, and reactive to light.   Neck:      Thyroid: No thyroid mass, thyromegaly or thyroid tenderness.   Cardiovascular:      Rate and Rhythm: Normal rate and regular rhythm.      Pulses: Normal pulses.           Dorsalis pedis pulses are 2+ on the right side and 2+ on the left side.        Posterior tibial pulses are 2+ on the right side and 2+ on the left side.      Heart sounds: Normal heart sounds.   Pulmonary:      Effort: Pulmonary effort is normal.      Breath sounds: Normal breath sounds.   Abdominal:      General: Bowel sounds are normal.      Palpations: Abdomen is soft.   Musculoskeletal:         General: Normal range of motion.      Cervical back: Normal range of motion and neck supple.   Feet:      Right foot:      Protective Sensation: 5 sites tested. 5 sites sensed.      Skin integrity: Skin integrity normal.      Toenail Condition: Right toenails are normal.      Left foot:      Protective Sensation: 5 sites tested. 5 sites sensed.      Skin integrity: Skin integrity normal.      Toenail Condition: Left toenails are normal.   Lymphadenopathy:      Cervical: No cervical adenopathy.   Skin:     General: Skin is warm and dry.   Neurological:      General: No focal deficit present.      Mental Status: He is alert.   Psychiatric:         Mood and Affect: Mood normal.         Behavior: Behavior normal.         Thought Content: Thought content normal.         Judgment: Judgment normal.         Labs:    HbA1c  Hemoglobin A1C   Date Value Ref Range Status   09/02/2021 8.8 % Final   .    CMP  Lab Results   Component Value Date    BUN 4 (L) 05/25/2021    " CREATININE 0.68 (L) 05/25/2021    EGFRIFNONA 117 05/25/2021    EGFRIFAFRI 142 05/25/2021    BCR 5.9 (L) 05/25/2021    K 4.9 05/25/2021    CO2 27.4 05/25/2021    CALCIUM 9.4 05/25/2021    PROTENTOTREF 5.7 (L) 05/25/2021    LABIL2 2.6 05/25/2021    AST 10 05/25/2021    ALT 7 05/25/2021        Lipid Panel  Lab Results   Component Value Date    CHLPL 228 (H) 10/16/2020    HDL 91 10/16/2020     (H) 10/16/2020    TRIG 56 10/16/2020        TSH  Lab Results   Component Value Date    TSH 0.015 (L) 05/25/2021    FREET4 0.82 (L) 02/24/2021        Hemoglobin A1C  Lab Results   Component Value Date    HGBA1C 8.8 09/02/2021        Microalbumin/Creatinine  No results found for: MALBCRERATI        Assessment / Plan      Assessment & Plan:  Diagnoses and all orders for this visit:    1. Uncontrolled type 1 diabetes mellitus with hyperglycemia (CMS/HCC) (Primary)  Assessment & Plan:  Diabetes is worsening.   Take R insulin before meals based on CHO counting instead of sliding scale.  Diabetes will be reassessed in 3 months.    Refer for DM education.  Check labs today.    Orders:  -     POC Glycosylated Hemoglobin (Hb A1C)  -     POC Glucose, Blood  -     Comprehensive Metabolic Panel; Future  -     Lipid Panel; Future  -     Microalbumin / Creatinine Urine Ratio - Urine, Clean Catch; Future  -     Ambulatory Referral to Diabetic Education    2. Acquired hypothyroidism  Assessment & Plan:  Continue T4.  Check TSH today.    Orders:  -     TSH; Future    3. Type 1 diabetes mellitus with hyperglycemia (CMS/HCC)  -     insulin detemir (Levemir) 100 UNIT/ML injection; Inject 14 Units under the skin into the appropriate area as directed 2 (Two) Times a Day.  Dispense: 30 mL; Refill: 3  -     HumuLIN R 100 UNIT/ML injection; Take 1u per 15g CHO TID with meals plus correction 1u per 50 over 150; max daily dose 30u  Dispense: 30 mL; Refill: 3       Return in about 3 months (around 12/2/2021) for Recheck with A1c, TSH.    Je Stovall  MD Britany   09/02/2021

## 2021-09-02 NOTE — CONSULTS
Patient attended 1 hour diabetes education class following his endocrinology visit. Please see media tab for assessment and notes if you use [employer]. If you are not an [employer] user a copy of patient's assessment and notes will be sent per routine. Thank you.

## 2021-09-03 DIAGNOSIS — Z72.0 TOBACCO ABUSE: ICD-10-CM

## 2021-09-03 DIAGNOSIS — Z71.6 TOBACCO ABUSE COUNSELING: ICD-10-CM

## 2021-09-03 RX ORDER — BUPROPION HYDROCHLORIDE 150 MG/1
TABLET ORAL
Qty: 30 TABLET | Refills: 0 | Status: SHIPPED | OUTPATIENT
Start: 2021-09-03 | End: 2021-10-26

## 2021-09-07 RX ORDER — LEVOTHYROXINE SODIUM 0.2 MG/1
200 TABLET ORAL DAILY
Qty: 90 TABLET | Refills: 3 | Status: SHIPPED | OUTPATIENT
Start: 2021-09-07 | End: 2022-01-17 | Stop reason: DRUGHIGH

## 2021-10-26 DIAGNOSIS — Z72.0 TOBACCO ABUSE: ICD-10-CM

## 2021-10-26 DIAGNOSIS — Z71.6 TOBACCO ABUSE COUNSELING: ICD-10-CM

## 2021-10-26 RX ORDER — BUPROPION HYDROCHLORIDE 150 MG/1
TABLET ORAL
Qty: 30 TABLET | Refills: 0 | Status: SHIPPED | OUTPATIENT
Start: 2021-10-26 | End: 2021-12-22 | Stop reason: SDUPTHER

## 2021-11-11 ENCOUNTER — DOCUMENTATION (OUTPATIENT)
Dept: DIABETES SERVICES | Facility: HOSPITAL | Age: 66
End: 2021-11-11

## 2021-11-11 NOTE — PLAN OF CARE
"[facility] contact for DM education completed by telephone. Pt reports he is feeling well but he is still experiencing significant hyper and hypoglycemia. SMBG 4-5x/day. Eating 4-5 small meals daily, taking meal-time insulin 2-3x/day most of the time.    Fasting BG this morning was 503, he reports taking his Levemir but skipping his meal to avoid worsening the hyperglycemia. Discussed importance of maintaining meal schedule despite hyperglycemia so that he can take his meal-time insulin. Pt verbalized understanding.   He states he does not typically take his meal-time insulin if his BG is <200 due to concern for hypoglycemia. Pt reports low BG typically occurs in the afternoon. He reports hypoglycemia unawareness, he has tried using a CGM before but found that it fell off frequently due to being caught on clothes, etc. Discussed he may want to consider CGM again without improvement in BG.  Reviewed treatment of hypoglycemia, encouraged him to keep fruit juice and/or glucose tablets available. Pt likes to eat a snack cake or egg sandwich when he is \"low\", encouraged him to use simple carbs to treat first and follow up with snack or sandwich. Pt verbalized understanding.    Emphasized importance of taking insulin as prescribed. Pt states he does use the \"formula\" from his previous endo visit to calculate meal time insulin. He is able to accurately state Levemir dose.     Pt agreeable to meet with educator at upcoming endo visit in January. Informed pt that endo provider will be notified of current BG levels and he is agreeable.    Pt appreciative of call. Encouraged him to call with concerns/questions. Will fup in Jan in-person.  "

## 2021-12-22 ENCOUNTER — OFFICE VISIT (OUTPATIENT)
Dept: INTERNAL MEDICINE | Facility: CLINIC | Age: 66
End: 2021-12-22

## 2021-12-22 VITALS
WEIGHT: 131 LBS | OXYGEN SATURATION: 100 % | BODY MASS INDEX: 17.36 KG/M2 | HEART RATE: 86 BPM | SYSTOLIC BLOOD PRESSURE: 120 MMHG | TEMPERATURE: 97.3 F | HEIGHT: 73 IN | DIASTOLIC BLOOD PRESSURE: 70 MMHG

## 2021-12-22 DIAGNOSIS — Z72.0 TOBACCO ABUSE: ICD-10-CM

## 2021-12-22 DIAGNOSIS — Z23 NEED FOR VACCINATION: ICD-10-CM

## 2021-12-22 DIAGNOSIS — E03.9 ACQUIRED HYPOTHYROIDISM: ICD-10-CM

## 2021-12-22 DIAGNOSIS — Z71.6 TOBACCO ABUSE COUNSELING: ICD-10-CM

## 2021-12-22 DIAGNOSIS — E78.2 MIXED HYPERLIPIDEMIA: Primary | ICD-10-CM

## 2021-12-22 DIAGNOSIS — E10.9 TYPE 1 DIABETES MELLITUS WITHOUT COMPLICATION (HCC): ICD-10-CM

## 2021-12-22 PROCEDURE — 99406 BEHAV CHNG SMOKING 3-10 MIN: CPT | Performed by: NURSE PRACTITIONER

## 2021-12-22 PROCEDURE — 90471 IMMUNIZATION ADMIN: CPT | Performed by: NURSE PRACTITIONER

## 2021-12-22 PROCEDURE — 99214 OFFICE O/P EST MOD 30 MIN: CPT | Performed by: NURSE PRACTITIONER

## 2021-12-22 PROCEDURE — 90686 IIV4 VACC NO PRSV 0.5 ML IM: CPT | Performed by: NURSE PRACTITIONER

## 2021-12-22 RX ORDER — ATORVASTATIN CALCIUM 10 MG/1
10 TABLET, FILM COATED ORAL NIGHTLY
Qty: 90 TABLET | Refills: 3 | Status: SHIPPED | OUTPATIENT
Start: 2021-12-22 | End: 2022-08-24 | Stop reason: SDUPTHER

## 2021-12-22 RX ORDER — BUPROPION HYDROCHLORIDE 150 MG/1
150 TABLET ORAL DAILY
Qty: 90 TABLET | Refills: 3 | Status: SHIPPED | OUTPATIENT
Start: 2021-12-22 | End: 2022-08-24 | Stop reason: SDUPTHER

## 2021-12-22 NOTE — PROGRESS NOTES
Office Visit      Patient Name: Rafael Bocanegra  : 1955   MRN: [id number]     Chief Complaint:    Chief Complaint   Patient presents with   • Med Refill     medication f/u       History of Present Illness: Rafael Bocanegra is a 66 y.o. male who is here today with request for medication refills.  Hyperlipidemia: Takes Lipitor 10 mg, ASA 81 mg daily.  Does not eat a heart healthy diet.  Is sedentary.  Continues to smoke at least 1 pack a day.  No diagnosis of hypertension. Denies chest pain, dyspnea, orthopnea, palpitations, lower extremity edema, confusion, headaches, weakness, visual disturbances.    T1DM, hypothyroidism: Takes insulin, levothyroxine as prescribed by endocrinology. Does not eat a low-carb diet. Patient denies foot ulcerations, nausea, polydipsia, polyuria, polyphagia, visual disturbances and weight loss.     Subjective      I have reviewed and the following portions of the patient's history were updated as appropriate: past family history, past medical history, past social history, past surgical history and problem list.      Current Outpatient Medications:   •  albuterol sulfate  (90 Base) MCG/ACT inhaler, Inhale 2 puffs Every 4 (Four) Hours As Needed for Wheezing or Shortness of Air., Disp: 18 g, Rfl: 11  •  aspirin 81 MG EC tablet, Take 81 mg by mouth Daily., Disp: , Rfl:   •  atorvastatin (LIPITOR) 10 MG tablet, Take 1 tablet by mouth Every Night., Disp: 90 tablet, Rfl: 3  •  buPROPion XL (WELLBUTRIN XL) 150 MG 24 hr tablet, Take 1 tablet by mouth Daily., Disp: 90 tablet, Rfl: 3  •  HumuLIN R 100 UNIT/ML injection, Take 1u per 15g CHO TID with meals plus correction 1u per 50 over 150; max daily dose 30u, Disp: 30 mL, Rfl: 3  •  insulin detemir (Levemir) 100 UNIT/ML injection, Inject 14 Units under the skin into the appropriate area as directed 2 (Two) Times a Day., Disp: 30 mL, Rfl: 3  •  levothyroxine (SYNTHROID, LEVOTHROID) 200 MCG tablet, Take 1 tablet by mouth Daily., Disp:  "90 tablet, Rfl: 3  •  OneTouch Ultra test strip, 1 each by Other route 4 (Four) Times a Day. E11.9 provide what is covered by ins, Disp: 200 each, Rfl: 11  •  vitamin D (ERGOCALCIFEROL) 1.25 MG (66499 UT) capsule capsule, Take 50,000 Units by mouth 1 (One) Time Per Week., Disp: , Rfl:     No Known Allergies    Objective     Physical Exam:  Vital Signs:   Vitals:    12/22/21 1025   BP: 120/70   Pulse: 86   Temp: 97.3 °F (36.3 °C)   TempSrc: Infrared   SpO2: 100%   Weight: 59.4 kg (131 lb)   Height: 185.9 cm (73.17\")     Body mass index is 17.2 kg/m².    Physical Exam  Constitutional:       Appearance: He is not ill-appearing.   HENT:      Head: Normocephalic.      Right Ear: External ear normal.      Left Ear: External ear normal.   Eyes:      Conjunctiva/sclera: Conjunctivae normal.      Pupils: Pupils are equal, round, and reactive to light.   Cardiovascular:      Rate and Rhythm: Normal rate and regular rhythm.      Heart sounds: Normal heart sounds.   Pulmonary:      Effort: Pulmonary effort is normal.      Breath sounds: Normal breath sounds.   Musculoskeletal:      Cervical back: Normal range of motion and neck supple.      Right lower leg: No edema.      Left lower leg: No edema.   Skin:     General: Skin is warm.      Capillary Refill: Capillary refill takes less than 2 seconds.   Neurological:      Mental Status: He is alert and oriented to person, place, and time.      Coordination: Coordination normal.      Gait: Gait normal.   Psychiatric:         Attention and Perception: Attention normal.         Mood and Affect: Mood and affect normal.         Speech: Speech normal.         Behavior: Behavior normal.             Assessment / Plan      Assessment/Plan:   Diagnoses and all orders for this visit:    1. Mixed hyperlipidemia (Primary)  -     atorvastatin (LIPITOR) 10 MG tablet; Take 1 tablet by mouth Every Night.  Dispense: 90 tablet; Refill: 3        - Heart healthy diet, daily physical activity     2. " Tobacco abuse  -     buPROPion XL (WELLBUTRIN XL) 150 MG 24 hr tablet; Take 1 tablet by mouth Daily.  Dispense: 90 tablet; Refill: 3.  Patient has taken this in the past, feels it might work this time.  - Refuses low dose CT scan of chest    3. Tobacco abuse counseling  -     buPROPion XL (WELLBUTRIN XL) 150 MG 24 hr tablet; Take 1 tablet by mouth Daily.  Dispense: 90 tablet; Refill: 3  - I advised patient of the risks in continuing to use tobacco, and I provided this patient with smoking cessation educational materials, discussed how to quit smoking and patient has expressed willingness to quit. He plans to quit in 2 weeks, 1/5/22. I spent 7 minutes counseling patient.      4. Need for vaccination  -     FluLaval/Fluarix/Fluzone >6 Months    5. Type 1 diabetes mellitus without complication (HCC)        - Follow diabetic diet        - Follow instructions from endocrinology        - See eye doctor annually or as discussed        - Wear protective foot wear/no bare feet        - Check feet regularly for calluses or ulcers        - Discussed risk of poorly controlled diabetes and long-term complications        - Exercise as tolerated for 30-45 minutes most days of the week. Gradually increase daily exercise if not currently active.        - Take all medications as prescribed    6. Acquired hypothyroidism         - Take levothyroxine as prescribed by endocrinology         - Follow-up with endocrinology as scheduled    Follow Up:   Return in about 3 months (around 3/22/2022) for Annual.    Patient was given instructions and counseling regarding his condition or for health maintenance advice. Please see specific information pulled into the AVS if appropriate.       [facility] [address] [place]     Please note that portions of this note may have been completed with a voice recognition program. Efforts were made to edit dictation, but occasionally words are mistranscribed.

## 2022-01-14 ENCOUNTER — LAB (OUTPATIENT)
Dept: LAB | Facility: HOSPITAL | Age: 67
End: 2022-01-14

## 2022-01-14 ENCOUNTER — OFFICE VISIT (OUTPATIENT)
Dept: ENDOCRINOLOGY | Facility: CLINIC | Age: 67
End: 2022-01-14

## 2022-01-14 VITALS
OXYGEN SATURATION: 96 % | HEART RATE: 91 BPM | BODY MASS INDEX: 17.76 KG/M2 | SYSTOLIC BLOOD PRESSURE: 150 MMHG | WEIGHT: 134 LBS | HEIGHT: 73 IN | DIASTOLIC BLOOD PRESSURE: 82 MMHG

## 2022-01-14 DIAGNOSIS — E10.649 UNCONTROLLED TYPE 1 DIABETES MELLITUS WITH HYPOGLYCEMIA WITHOUT COMA: ICD-10-CM

## 2022-01-14 DIAGNOSIS — E10.65 UNCONTROLLED TYPE 1 DIABETES MELLITUS WITH HYPERGLYCEMIA: Primary | ICD-10-CM

## 2022-01-14 DIAGNOSIS — E03.9 ACQUIRED HYPOTHYROIDISM: ICD-10-CM

## 2022-01-14 DIAGNOSIS — E78.2 MIXED HYPERLIPIDEMIA: ICD-10-CM

## 2022-01-14 LAB
EXPIRATION DATE: ABNORMAL
EXPIRATION DATE: NORMAL
GLUCOSE BLDC GLUCOMTR-MCNC: 425 MG/DL (ref 70–130)
HBA1C MFR BLD: 8.5 %
Lab: ABNORMAL
Lab: NORMAL

## 2022-01-14 PROCEDURE — 99214 OFFICE O/P EST MOD 30 MIN: CPT | Performed by: INTERNAL MEDICINE

## 2022-01-14 PROCEDURE — 84443 ASSAY THYROID STIM HORMONE: CPT

## 2022-01-14 PROCEDURE — 3052F HG A1C>EQUAL 8.0%<EQUAL 9.0%: CPT | Performed by: INTERNAL MEDICINE

## 2022-01-14 PROCEDURE — 82947 ASSAY GLUCOSE BLOOD QUANT: CPT | Performed by: INTERNAL MEDICINE

## 2022-01-14 PROCEDURE — 83036 HEMOGLOBIN GLYCOSYLATED A1C: CPT | Performed by: INTERNAL MEDICINE

## 2022-01-14 RX ORDER — INSULIN DETEMIR 100 [IU]/ML
12 INJECTION, SOLUTION SUBCUTANEOUS 2 TIMES DAILY
Qty: 30 ML | Refills: 3 | Status: SHIPPED | OUTPATIENT
Start: 2022-01-14 | End: 2022-11-21 | Stop reason: SDUPTHER

## 2022-01-14 NOTE — ASSESSMENT & PLAN NOTE
Diabetes is improving with treatment.  A1c slightly better but above goal at 8.5%.  He isn't taking mealtime insulin if glucose is <200.  Glucose before lunch today was 198.  He didn't take insulin and ate.  Glucose now is 425.    Continue current treatment regimen.  But take mealtime insulin even when glucose is normal.   Diabetes will be reassessed in 3 months.

## 2022-01-14 NOTE — PROGRESS NOTES
"     Office Note      Date: 2022  Patient Name: Rafael Bocanegra  MRN: [id number]  : 1955    Chief Complaint   Patient presents with   • Diabetes     Type I   • Hypothyroidism   • Hyperthyroidism       History of Present Illness:   Rafael Bocanegra is a 68 y.o. male who presents for Diabetes type 1. Diagnosed in: . Treated in past with insulin. Current treatments: basal bolus insulin. Number of insulin shots per day: 4. Checks blood sugar >4 times a day. Has low blood sugar: occ. Aspirin use: Yes. Statin use: Yes. ACE-I/ARB use: No -  .  Change in health since last visit: none. Last eye exam:      He is currently on T4 200mcg qd.  He is taking the T4 correctly.  He isn't taking any interfering meds concurrently.  He hasn't noted any change in the size of his neck.  He denies any compressive sxs.  He denies any sxs of hypo- or hyperthyroidism at this time.    Subjective      Diabetic Complications:  Eyes: No  Kidneys: No  Feet: No  Heart: No    Diet and Exercise:  Meals per day: 3  Minutes of exercise per week: 0 mins.    Review of Systems:   Review of Systems   Constitutional: Negative.    Cardiovascular: Negative.    Gastrointestinal: Negative.    Endocrine: Negative.        The following portions of the patient's history were reviewed and updated as appropriate: allergies, current medications, past family history, past medical history, past social history, past surgical history and problem list.    Objective       Visit Vitals  /82 (BP Location: Left arm, Patient Position: Sitting, Cuff Size: Adult)   Pulse 91   Ht 185.4 cm (73\")   Wt 60.8 kg (134 lb)   SpO2 96%   BMI 17.68 kg/m²       Physical Exam:  Physical Exam  Constitutional:       Appearance: Normal appearance.   Neurological:      Mental Status: He is alert.         Labs:    HbA1c  Lab Results   Component Value Date    HGBA1C 8.5 2022       CMP  Lab Results   Component Value Date    GLUCOSE 178 (H) 2021    BUN 5 (L) " 09/02/2021    CREATININE 0.82 09/02/2021    EGFRIFNONA 95 09/02/2021    EGFRIFAFRI 142 05/25/2021    BCR 6.1 (L) 09/02/2021    K 4.4 09/02/2021    CO2 30.5 (H) 09/02/2021    CALCIUM 9.3 09/02/2021    PROTENTOTREF 5.7 (L) 05/25/2021    LABIL2 2.6 05/25/2021    AST 24 09/02/2021    ALT 28 09/02/2021        Lipid Panel  Lab Results   Component Value Date    CHLPL 228 (H) 10/16/2020    HDL 67 (H) 09/02/2021    LDL 58 09/02/2021    TRIG 44 09/02/2021        TSH  Lab Results   Component Value Date    TSH 45.700 (H) 09/02/2021    FREET4 0.82 (L) 02/24/2021        Hemoglobin A1C  Lab Results   Component Value Date    HGBA1C 8.5 01/14/2022        Microalbumin/Creatinine  Lab Results   Component Value Date    MALBCRERATIO  09/02/2021      Comment:      Unable to calculate    MICROALBUR <1.2 09/02/2021           Assessment / Plan      Assessment & Plan:  Diagnoses and all orders for this visit:    1. Uncontrolled type 1 diabetes mellitus with hyperglycemia (HCC) (Primary)  Assessment & Plan:  Diabetes is improving with treatment.  A1c slightly better but above goal at 8.5%.  He isn't taking mealtime insulin if glucose is <200.  Glucose before lunch today was 198.  He didn't take insulin and ate.  Glucose now is 425.    Continue current treatment regimen.  But take mealtime insulin even when glucose is normal.   Diabetes will be reassessed in 3 months.    Orders:  -     POC Glucose, Blood  -     POC Glycosylated Hemoglobin (Hb A1C)    2. Mixed hyperlipidemia  Assessment & Plan:  Continue statin.      3. Acquired hypothyroidism  -     TSH; Future    4. Uncontrolled type 1 diabetes mellitus with hypoglycemia without coma (HCC)  Assessment & Plan:  Less hypoglycemia now - usually fasting though.  Decrease basal insulin.        Return in about 3 months (around 4/14/2022) for Recheck with A1c, TSH.    Je Garg MD   01/14/2022

## 2022-01-15 LAB — TSH SERPL DL<=0.05 MIU/L-ACNC: <0.005 UIU/ML (ref 0.27–4.2)

## 2022-01-17 RX ORDER — LEVOTHYROXINE SODIUM 175 UG/1
175 TABLET ORAL DAILY
Qty: 90 TABLET | Refills: 3 | Status: SHIPPED | OUTPATIENT
Start: 2022-01-17 | End: 2022-09-15 | Stop reason: SDUPTHER

## 2022-05-02 DIAGNOSIS — E10.65 TYPE 1 DIABETES MELLITUS WITH HYPERGLYCEMIA: ICD-10-CM

## 2022-05-02 RX ORDER — BLOOD SUGAR DIAGNOSTIC
1 STRIP MISCELLANEOUS 3 TIMES DAILY
Qty: 100 EACH | Refills: 3 | Status: SHIPPED | OUTPATIENT
Start: 2022-05-02 | End: 2022-08-24 | Stop reason: SDUPTHER

## 2022-08-24 ENCOUNTER — OFFICE VISIT (OUTPATIENT)
Dept: INTERNAL MEDICINE | Facility: CLINIC | Age: 67
End: 2022-08-24

## 2022-08-24 VITALS
DIASTOLIC BLOOD PRESSURE: 60 MMHG | OXYGEN SATURATION: 100 % | HEIGHT: 73 IN | BODY MASS INDEX: 16.7 KG/M2 | SYSTOLIC BLOOD PRESSURE: 134 MMHG | WEIGHT: 126 LBS | TEMPERATURE: 97.3 F | HEART RATE: 113 BPM

## 2022-08-24 DIAGNOSIS — R63.4 WEIGHT LOSS: ICD-10-CM

## 2022-08-24 DIAGNOSIS — E10.65 TYPE 1 DIABETES MELLITUS WITH HYPERGLYCEMIA: ICD-10-CM

## 2022-08-24 DIAGNOSIS — Z13.29 SCREENING FOR ENDOCRINE, METABOLIC AND IMMUNITY DISORDER: ICD-10-CM

## 2022-08-24 DIAGNOSIS — Z72.0 TOBACCO ABUSE: ICD-10-CM

## 2022-08-24 DIAGNOSIS — Z13.6 SCREENING FOR ABDOMINAL AORTIC ANEURYSM: ICD-10-CM

## 2022-08-24 DIAGNOSIS — Z12.11 ENCOUNTER FOR SCREENING FOR MALIGNANT NEOPLASM OF COLON: ICD-10-CM

## 2022-08-24 DIAGNOSIS — Z91.81 AT MODERATE RISK FOR FALL: ICD-10-CM

## 2022-08-24 DIAGNOSIS — R94.31 ABNORMAL EKG: ICD-10-CM

## 2022-08-24 DIAGNOSIS — Z87.891 PERSONAL HISTORY OF TOBACCO USE, PRESENTING HAZARDS TO HEALTH: ICD-10-CM

## 2022-08-24 DIAGNOSIS — Z87.891 PERSONAL HISTORY OF NICOTINE DEPENDENCE: ICD-10-CM

## 2022-08-24 DIAGNOSIS — E78.2 MIXED HYPERLIPIDEMIA: ICD-10-CM

## 2022-08-24 DIAGNOSIS — Z23 IMMUNIZATION DUE: ICD-10-CM

## 2022-08-24 DIAGNOSIS — E87.1 HYPONATREMIA: ICD-10-CM

## 2022-08-24 DIAGNOSIS — J45.40 MODERATE PERSISTENT ASTHMA WITHOUT COMPLICATION: ICD-10-CM

## 2022-08-24 DIAGNOSIS — Z12.2 ENCOUNTER FOR SCREENING FOR LUNG CANCER: ICD-10-CM

## 2022-08-24 DIAGNOSIS — Z00.00 MEDICARE ANNUAL WELLNESS VISIT, SUBSEQUENT: Primary | ICD-10-CM

## 2022-08-24 DIAGNOSIS — Z71.6 TOBACCO ABUSE COUNSELING: ICD-10-CM

## 2022-08-24 DIAGNOSIS — Z13.228 SCREENING FOR ENDOCRINE, METABOLIC AND IMMUNITY DISORDER: ICD-10-CM

## 2022-08-24 DIAGNOSIS — E03.9 ACQUIRED HYPOTHYROIDISM: ICD-10-CM

## 2022-08-24 DIAGNOSIS — Z13.0 SCREENING FOR ENDOCRINE, METABOLIC AND IMMUNITY DISORDER: ICD-10-CM

## 2022-08-24 PROCEDURE — 93005 ELECTROCARDIOGRAM TRACING: CPT | Performed by: NURSE PRACTITIONER

## 2022-08-24 PROCEDURE — G0402 INITIAL PREVENTIVE EXAM: HCPCS | Performed by: NURSE PRACTITIONER

## 2022-08-24 PROCEDURE — 1170F FXNL STATUS ASSESSED: CPT | Performed by: NURSE PRACTITIONER

## 2022-08-24 PROCEDURE — 1159F MED LIST DOCD IN RCRD: CPT | Performed by: NURSE PRACTITIONER

## 2022-08-24 RX ORDER — ALBUTEROL SULFATE 90 UG/1
2 AEROSOL, METERED RESPIRATORY (INHALATION) EVERY 4 HOURS PRN
Qty: 18 G | Refills: 11 | Status: SHIPPED | OUTPATIENT
Start: 2022-08-24

## 2022-08-24 RX ORDER — HUMAN INSULIN 100 [IU]/ML
INJECTION, SOLUTION SUBCUTANEOUS
Qty: 30 ML | Refills: 12 | Status: SHIPPED | OUTPATIENT
Start: 2022-08-24 | End: 2023-02-24 | Stop reason: SDUPTHER

## 2022-08-24 RX ORDER — ATORVASTATIN CALCIUM 10 MG/1
10 TABLET, FILM COATED ORAL NIGHTLY
Qty: 90 TABLET | Refills: 3 | Status: SHIPPED | OUTPATIENT
Start: 2022-08-24

## 2022-08-24 RX ORDER — ASPIRIN 81 MG/1
81 TABLET ORAL DAILY
Qty: 90 TABLET | Refills: 3 | Status: SHIPPED | OUTPATIENT
Start: 2022-08-24 | End: 2023-02-24 | Stop reason: SDUPTHER

## 2022-08-24 RX ORDER — BLOOD SUGAR DIAGNOSTIC
1 STRIP MISCELLANEOUS 3 TIMES DAILY
Qty: 100 EACH | Refills: 3 | Status: SHIPPED | OUTPATIENT
Start: 2022-08-24 | End: 2022-11-08 | Stop reason: SDUPTHER

## 2022-08-24 RX ORDER — BUPROPION HYDROCHLORIDE 150 MG/1
150 TABLET ORAL DAILY
Qty: 90 TABLET | Refills: 3 | Status: SHIPPED | OUTPATIENT
Start: 2022-08-24 | End: 2023-02-24 | Stop reason: SDUPTHER

## 2022-09-06 ENCOUNTER — TELEPHONE (OUTPATIENT)
Dept: INTERNAL MEDICINE | Facility: CLINIC | Age: 67
End: 2022-09-06

## 2022-09-06 NOTE — TELEPHONE ENCOUNTER
Hub staff attempted to follow warm transfer process and was unsuccessful     Caller: CYNTHIA VANG    Relationship to patient: Brother/Sister    Best call back number: [phone]    Patient is needing: SHE IS CALLING IN TO SHEDULE A LAB APPOINTMENT FOR HER BROTHER

## 2022-09-15 LAB
ALBUMIN SERPL-MCNC: 4.1 G/DL (ref 3.5–5.2)
ALBUMIN/GLOB SERPL: 2.4 G/DL
ALP SERPL-CCNC: 100 U/L (ref 39–117)
ALT SERPL-CCNC: 10 U/L (ref 1–41)
AST SERPL-CCNC: <5 U/L (ref 1–40)
BILIRUB SERPL-MCNC: 0.5 MG/DL (ref 0–1.2)
BUN SERPL-MCNC: 8 MG/DL (ref 8–23)
BUN/CREAT SERPL: 12.1 (ref 7–25)
CALCIUM SERPL-MCNC: 9.6 MG/DL (ref 8.6–10.5)
CHLORIDE SERPL-SCNC: 94 MMOL/L (ref 98–107)
CHOLEST SERPL-MCNC: 116 MG/DL (ref 0–200)
CO2 SERPL-SCNC: 28.4 MMOL/L (ref 22–29)
CREAT SERPL-MCNC: 0.66 MG/DL (ref 0.76–1.27)
EGFRCR-CYS SERPLBLD CKD-EPI 2021: 104.1 ML/MIN/1.73
GLOBULIN SER CALC-MCNC: 1.7 GM/DL
GLUCOSE SERPL-MCNC: 279 MG/DL (ref 65–99)
HBA1C MFR BLD: 9.5 % (ref 4.8–5.6)
HDLC SERPL-MCNC: 63 MG/DL (ref 40–60)
LDLC SERPL CALC-MCNC: 38 MG/DL (ref 0–100)
POTASSIUM SERPL-SCNC: 4.6 MMOL/L (ref 3.5–5.2)
PROT SERPL-MCNC: 5.8 G/DL (ref 6–8.5)
SODIUM SERPL-SCNC: 132 MMOL/L (ref 136–145)
TRIGL SERPL-MCNC: 73 MG/DL (ref 0–150)
TSH SERPL DL<=0.005 MIU/L-ACNC: <0.005 UIU/ML (ref 0.27–4.2)
VLDLC SERPL CALC-MCNC: 15 MG/DL (ref 5–40)

## 2022-09-15 RX ORDER — LEVOTHYROXINE SODIUM 0.15 MG/1
150 TABLET ORAL DAILY
Qty: 90 TABLET | Refills: 1 | Status: SHIPPED | OUTPATIENT
Start: 2022-09-15 | End: 2023-02-28 | Stop reason: SDUPTHER

## 2022-11-08 DIAGNOSIS — E10.65 TYPE 1 DIABETES MELLITUS WITH HYPERGLYCEMIA: ICD-10-CM

## 2022-11-08 RX ORDER — BLOOD SUGAR DIAGNOSTIC
1 STRIP MISCELLANEOUS 3 TIMES DAILY
Qty: 100 EACH | Refills: 4 | Status: SHIPPED | OUTPATIENT
Start: 2022-11-08 | End: 2022-11-21 | Stop reason: SDUPTHER

## 2022-11-08 NOTE — TELEPHONE ENCOUNTER
Rx Refill Note  Requested Prescriptions     Pending Prescriptions Disp Refills   • OneTouch Ultra test strip 100 each 3     Si each by Other route 3 (Three) Times a Day. E11.9 provide what is covered by ins      Last office visit with prescribing clinician: 2022      Next office visit with prescribing clinician: 2023            Amanda Sidhu LPN  22, 11:46 EST

## 2022-11-08 NOTE — TELEPHONE ENCOUNTER
Caller: CIERA ALVARADO    Relationship: Brother/Sister    Best call back number: [phone]    Requested Prescriptions:   Requested Prescriptions     Pending Prescriptions Disp Refills   • OneTouch Ultra test strip 100 each 3     Si each by Other route 3 (Three) Times a Day. E11.9 provide what is covered by [facility]        Pharmacy where request should be sent: [facility] [address] [place] [address] [phone] [facility] [phone] FX         Does the patient have less than a 3 day supply:  [x] Yes  [] No    Stacie Ferrari Rep   22 11:06 EST

## 2022-11-21 DIAGNOSIS — E10.65 TYPE 1 DIABETES MELLITUS WITH HYPERGLYCEMIA: ICD-10-CM

## 2022-11-21 RX ORDER — BLOOD SUGAR DIAGNOSTIC
1 STRIP MISCELLANEOUS 3 TIMES DAILY
Qty: 100 EACH | Refills: 4 | Status: SHIPPED | OUTPATIENT
Start: 2022-11-21

## 2022-11-21 RX ORDER — INSULIN DETEMIR 100 [IU]/ML
12 INJECTION, SOLUTION SUBCUTANEOUS 2 TIMES DAILY
Qty: 30 ML | Refills: 3 | Status: SHIPPED | OUTPATIENT
Start: 2022-11-21 | End: 2023-02-24 | Stop reason: SDUPTHER

## 2022-11-21 NOTE — TELEPHONE ENCOUNTER
Caller: Daljit Rafael    Relationship: Self    Best call back number:  [phone]    Requested Prescriptions:   Requested Prescriptions     Pending Prescriptions Disp Refills   • insulin detemir (Levemir) 100 UNIT/ML injection 30 mL 3     Sig: Inject 12 Units under the skin into the appropriate area as directed 2 (Two) Times a Day.   • OneTouch Ultra test strip 100 each 4     Si each by Other route 3 (Three) Times a Day. E11.9        Pharmacy where request should be sent: [address] [phone] [facility] [phone] FX     Additional details provided by patient:     Does the patient have less than a 3 day supply:  [x] Yes  [] No

## 2023-02-24 ENCOUNTER — OFFICE VISIT (OUTPATIENT)
Dept: INTERNAL MEDICINE | Facility: CLINIC | Age: 68
End: 2023-02-24
Payer: MEDICARE

## 2023-02-24 VITALS
TEMPERATURE: 97.5 F | SYSTOLIC BLOOD PRESSURE: 126 MMHG | WEIGHT: 135 LBS | HEIGHT: 73 IN | DIASTOLIC BLOOD PRESSURE: 82 MMHG | BODY MASS INDEX: 17.89 KG/M2 | HEART RATE: 78 BPM | OXYGEN SATURATION: 99 %

## 2023-02-24 DIAGNOSIS — E55.9 VITAMIN D DEFICIENCY: ICD-10-CM

## 2023-02-24 DIAGNOSIS — J45.40 MODERATE PERSISTENT ASTHMA WITHOUT COMPLICATION: ICD-10-CM

## 2023-02-24 DIAGNOSIS — E87.1 HYPONATREMIA: ICD-10-CM

## 2023-02-24 DIAGNOSIS — E78.2 MIXED HYPERLIPIDEMIA: ICD-10-CM

## 2023-02-24 DIAGNOSIS — Z71.6 TOBACCO ABUSE COUNSELING: ICD-10-CM

## 2023-02-24 DIAGNOSIS — Z72.0 TOBACCO ABUSE: ICD-10-CM

## 2023-02-24 DIAGNOSIS — E10.65 UNCONTROLLED TYPE 1 DIABETES MELLITUS WITH HYPERGLYCEMIA: Primary | ICD-10-CM

## 2023-02-24 DIAGNOSIS — Z91.199 MEDICALLY NONCOMPLIANT: ICD-10-CM

## 2023-02-24 DIAGNOSIS — E03.9 ACQUIRED HYPOTHYROIDISM: ICD-10-CM

## 2023-02-24 PROCEDURE — 99214 OFFICE O/P EST MOD 30 MIN: CPT | Performed by: NURSE PRACTITIONER

## 2023-02-24 RX ORDER — ASPIRIN 81 MG/1
81 TABLET ORAL DAILY
Qty: 90 TABLET | Refills: 3 | Status: SHIPPED | OUTPATIENT
Start: 2023-02-24

## 2023-02-24 RX ORDER — GLUCAGON INJECTION, SOLUTION 0.5 MG/.1ML
1 INJECTION, SOLUTION SUBCUTANEOUS DAILY PRN
Qty: 0.2 ML | Refills: 1 | Status: SHIPPED | OUTPATIENT
Start: 2023-02-24

## 2023-02-24 RX ORDER — HUMAN INSULIN 100 [IU]/ML
INJECTION, SOLUTION SUBCUTANEOUS
Qty: 30 ML | Refills: 12 | Status: SHIPPED | OUTPATIENT
Start: 2023-02-24

## 2023-02-24 RX ORDER — BUPROPION HYDROCHLORIDE 150 MG/1
150 TABLET ORAL DAILY
Qty: 90 TABLET | Refills: 3 | Status: SHIPPED | OUTPATIENT
Start: 2023-02-24

## 2023-02-24 RX ORDER — LEVOTHYROXINE SODIUM 0.15 MG/1
150 TABLET ORAL DAILY
Qty: 90 TABLET | Refills: 1 | Status: CANCELLED | OUTPATIENT
Start: 2023-02-24

## 2023-02-24 RX ORDER — INSULIN DETEMIR 100 [IU]/ML
12 INJECTION, SOLUTION SUBCUTANEOUS 2 TIMES DAILY
Qty: 30 ML | Refills: 3 | Status: SHIPPED | OUTPATIENT
Start: 2023-02-24

## 2023-02-25 LAB
25(OH)D3+25(OH)D2 SERPL-MCNC: 12.6 NG/ML (ref 30–100)
ALBUMIN SERPL-MCNC: 4.7 G/DL (ref 3.5–5.2)
ALBUMIN/GLOB SERPL: 2.4 G/DL
ALP SERPL-CCNC: 96 U/L (ref 39–117)
ALT SERPL-CCNC: 22 U/L (ref 1–41)
AST SERPL-CCNC: 19 U/L (ref 1–40)
BILIRUB SERPL-MCNC: 0.4 MG/DL (ref 0–1.2)
BUN SERPL-MCNC: 7 MG/DL (ref 8–23)
BUN/CREAT SERPL: 9.7 (ref 7–25)
CALCIUM SERPL-MCNC: 10 MG/DL (ref 8.6–10.5)
CHLORIDE SERPL-SCNC: 91 MMOL/L (ref 98–107)
CO2 SERPL-SCNC: 31.6 MMOL/L (ref 22–29)
CREAT SERPL-MCNC: 0.72 MG/DL (ref 0.76–1.27)
EGFRCR SERPLBLD CKD-EPI 2021: 100.8 ML/MIN/1.73
GLOBULIN SER CALC-MCNC: 2 GM/DL
GLUCOSE SERPL-MCNC: 281 MG/DL (ref 65–99)
HBA1C MFR BLD: 9.4 % (ref 4.8–5.6)
POTASSIUM SERPL-SCNC: 5 MMOL/L (ref 3.5–5.2)
PROT SERPL-MCNC: 6.7 G/DL (ref 6–8.5)
SODIUM SERPL-SCNC: 133 MMOL/L (ref 136–145)
T4 FREE SERPL-MCNC: 0.44 NG/DL (ref 0.93–1.7)
TSH SERPL DL<=0.005 MIU/L-ACNC: 23.1 UIU/ML (ref 0.27–4.2)

## 2023-02-28 RX ORDER — LEVOTHYROXINE SODIUM 0.2 MG/1
200 TABLET ORAL DAILY
Qty: 30 TABLET | Refills: 1 | Status: SHIPPED | OUTPATIENT
Start: 2023-02-28

## 2023-02-28 RX ORDER — ERGOCALCIFEROL 1.25 MG/1
50000 CAPSULE ORAL WEEKLY
Qty: 12 CAPSULE | Refills: 1 | Status: SHIPPED | OUTPATIENT
Start: 2023-02-28

## 2023-08-07 DIAGNOSIS — E55.9 VITAMIN D DEFICIENCY: ICD-10-CM

## 2023-08-07 RX ORDER — ERGOCALCIFEROL 1.25 MG/1
50000 CAPSULE ORAL WEEKLY
Qty: 12 CAPSULE | Refills: 0 | Status: SHIPPED | OUTPATIENT
Start: 2023-08-07

## 2023-08-10 DIAGNOSIS — E10.65 TYPE 1 DIABETES MELLITUS WITH HYPERGLYCEMIA: ICD-10-CM

## 2023-08-10 RX ORDER — BLOOD SUGAR DIAGNOSTIC
1 STRIP MISCELLANEOUS 3 TIMES DAILY
Qty: 100 EACH | Refills: 0 | Status: SHIPPED | OUTPATIENT
Start: 2023-08-10

## 2023-08-10 NOTE — TELEPHONE ENCOUNTER
Caller: Rafael Bocanegra    Relationship: Self    Best call back number: [phone]    Requested Prescriptions:   Requested Prescriptions     Pending Prescriptions Disp Refills    OneTouch Ultra test strip 100 each 4     Si each by Other route 3 (Three) Times a Day. E11.9        Pharmacy where request should be sent: [facility] [address] [place] [address] [phone] [facility] [phone]     Last office visit with prescribing clinician: 2023   Last telemedicine visit with prescribing clinician: Visit date not found   Next office visit with prescribing clinician: 2023     Additional details provided by patient:     Does the patient have less than a 3 day supply:  [x] Yes  [] No    Would you like a call back once the refill request has been completed: [] Yes [x] No    If the office needs to give you a call back, can they leave a voicemail: [] Yes [x] No    Stacie Hogan Rep   08/10/23 13:14 EDT

## 2023-09-08 DIAGNOSIS — E78.2 MIXED HYPERLIPIDEMIA: ICD-10-CM

## 2023-09-08 DIAGNOSIS — E03.9 ACQUIRED HYPOTHYROIDISM: ICD-10-CM

## 2023-09-08 DIAGNOSIS — E10.65 TYPE 1 DIABETES MELLITUS WITH HYPERGLYCEMIA: ICD-10-CM

## 2023-09-08 DIAGNOSIS — Z72.0 TOBACCO ABUSE: ICD-10-CM

## 2023-09-08 DIAGNOSIS — E55.9 VITAMIN D DEFICIENCY: ICD-10-CM

## 2023-09-08 DIAGNOSIS — Z71.6 TOBACCO ABUSE COUNSELING: ICD-10-CM

## 2023-09-08 RX ORDER — LEVOTHYROXINE SODIUM 0.2 MG/1
200 TABLET ORAL DAILY
Qty: 30 TABLET | Refills: 0 | Status: SHIPPED | OUTPATIENT
Start: 2023-09-08

## 2023-09-08 RX ORDER — ERGOCALCIFEROL 1.25 MG/1
50000 CAPSULE ORAL WEEKLY
Qty: 12 CAPSULE | Refills: 0 | Status: SHIPPED | OUTPATIENT
Start: 2023-09-08

## 2023-09-08 RX ORDER — ASPIRIN 81 MG/1
81 TABLET ORAL DAILY
Qty: 30 TABLET | Refills: 0 | Status: SHIPPED | OUTPATIENT
Start: 2023-09-08

## 2023-09-08 RX ORDER — BUPROPION HYDROCHLORIDE 150 MG/1
150 TABLET ORAL DAILY
Qty: 30 TABLET | Refills: 0 | Status: SHIPPED | OUTPATIENT
Start: 2023-09-08

## 2023-09-08 RX ORDER — BLOOD SUGAR DIAGNOSTIC
1 STRIP MISCELLANEOUS 3 TIMES DAILY
Qty: 100 EACH | Refills: 0 | Status: SHIPPED | OUTPATIENT
Start: 2023-09-08

## 2023-09-08 RX ORDER — ATORVASTATIN CALCIUM 10 MG/1
10 TABLET, FILM COATED ORAL NIGHTLY
Qty: 30 TABLET | Refills: 0 | Status: SHIPPED | OUTPATIENT
Start: 2023-09-08

## 2023-09-08 NOTE — TELEPHONE ENCOUNTER
Caller: Rafael Bocanegra    Relationship: Self    Best call back number: [phone], SISTER'S PHONE    Requested Prescriptions:   Requested Prescriptions     Pending Prescriptions Disp Refills    OneTouch Ultra test strip 100 each 0     Si each by Other route 3 (Three) Times a Day. E11.9    buPROPion XL (WELLBUTRIN XL) 150 MG 24 hr tablet 90 tablet 3     Sig: Take 1 tablet by mouth Daily.    atorvastatin (LIPITOR) 10 MG tablet 90 tablet 3     Sig: Take 1 tablet by mouth Every Night.    levothyroxine (SYNTHROID, LEVOTHROID) 200 MCG tablet 30 tablet 1     Sig: Take 1 tablet by mouth Daily.    aspirin 81 MG EC tablet 90 tablet 3     Sig: Take 1 tablet by mouth Daily.    Insulin Glargine (LANTUS SOLOSTAR) 100 UNIT/ML injection pen 27 mL 1     Sig: Inject 12 Units under the skin into the appropriate area as directed 2 (Two) Times a Day.    vitamin D (ERGOCALCIFEROL) 1.25 MG (11949 UT) capsule capsule 12 capsule 0     Sig: Take 1 capsule by mouth 1 (One) Time Per Week.        Pharmacy where request should be sent: [address] [phone] [facility] [phone] FX     Last office visit with prescribing clinician: 2023   Last telemedicine visit with prescribing clinician: Visit date not found   Next office visit with prescribing clinician: Visit date not found     Additional details provided by patient:   PLEASE SEND 90 DAY SUPPLY WITH REFILLS    Does the patient have less than a 3 day supply:  [x] Yes  [] No    If the office needs to give you a call back, can they leave a voicemail: [x] Yes [] No    Stacie Fabian Rep   23 09:58 EDT

## 2023-09-08 NOTE — TELEPHONE ENCOUNTER
Rx Refill Note  Requested Prescriptions     Pending Prescriptions Disp Refills    OneTouch Ultra test strip 100 each 0     Si each by Other route 3 (Three) Times a Day. E11.9    buPROPion XL (WELLBUTRIN XL) 150 MG 24 hr tablet 90 tablet 3     Sig: Take 1 tablet by mouth Daily.    atorvastatin (LIPITOR) 10 MG tablet 90 tablet 3     Sig: Take 1 tablet by mouth Every Night.    levothyroxine (SYNTHROID, LEVOTHROID) 200 MCG tablet 30 tablet 1     Sig: Take 1 tablet by mouth Daily.    aspirin 81 MG EC tablet 90 tablet 3     Sig: Take 1 tablet by mouth Daily.    Insulin Glargine (LANTUS SOLOSTAR) 100 UNIT/ML injection pen 27 mL 1     Sig: Inject 12 Units under the skin into the appropriate area as directed 2 (Two) Times a Day.    vitamin D (ERGOCALCIFEROL) 1.25 MG (57265 UT) capsule capsule 12 capsule 0     Sig: Take 1 capsule by mouth 1 (One) Time Per Week.      Last office visit with prescribing clinician: 2023   Last telemedicine visit with prescribing clinician: Visit date not found   Next office visit with prescribing clinician: - with Taryn  Last lab

## 2023-09-14 ENCOUNTER — REFERRAL TRIAGE (OUTPATIENT)
Dept: CASE MANAGEMENT | Facility: OTHER | Age: 68
End: 2023-09-14
Payer: MEDICARE

## 2023-09-14 ENCOUNTER — OFFICE VISIT (OUTPATIENT)
Dept: INTERNAL MEDICINE | Facility: CLINIC | Age: 68
End: 2023-09-14
Payer: MEDICARE

## 2023-09-14 ENCOUNTER — TELEPHONE (OUTPATIENT)
Dept: INTERNAL MEDICINE | Facility: CLINIC | Age: 68
End: 2023-09-14
Payer: MEDICARE

## 2023-09-14 VITALS
BODY MASS INDEX: 20.46 KG/M2 | SYSTOLIC BLOOD PRESSURE: 132 MMHG | DIASTOLIC BLOOD PRESSURE: 78 MMHG | WEIGHT: 154.4 LBS | TEMPERATURE: 97 F | OXYGEN SATURATION: 97 % | HEART RATE: 83 BPM | HEIGHT: 73 IN

## 2023-09-14 DIAGNOSIS — I73.9 CLAUDICATION: ICD-10-CM

## 2023-09-14 DIAGNOSIS — F17.210 CIGARETTE NICOTINE DEPENDENCE WITHOUT COMPLICATION: ICD-10-CM

## 2023-09-14 DIAGNOSIS — E10.649 UNCONTROLLED TYPE 1 DIABETES MELLITUS WITH HYPOGLYCEMIA WITHOUT COMA: ICD-10-CM

## 2023-09-14 DIAGNOSIS — E10.65 UNCONTROLLED TYPE 1 DIABETES MELLITUS WITH HYPERGLYCEMIA: ICD-10-CM

## 2023-09-14 DIAGNOSIS — E03.9 ACQUIRED HYPOTHYROIDISM: ICD-10-CM

## 2023-09-14 DIAGNOSIS — I10 HYPERTENSION, UNSPECIFIED TYPE: Primary | ICD-10-CM

## 2023-09-14 DIAGNOSIS — I10 ESSENTIAL HYPERTENSION: Primary | ICD-10-CM

## 2023-09-14 LAB
EXPIRATION DATE: NORMAL
HBA1C MFR BLD: 8.2 %
Lab: NORMAL

## 2023-09-14 RX ORDER — ACYCLOVIR 400 MG/1
1 TABLET ORAL ONCE
Qty: 1 EACH | Refills: 3 | Status: SHIPPED | OUTPATIENT
Start: 2023-09-14 | End: 2023-09-14

## 2023-09-14 RX ORDER — ACYCLOVIR 400 MG/1
1 TABLET ORAL
Qty: 2 EACH | Refills: 12 | Status: SHIPPED | OUTPATIENT
Start: 2023-09-14

## 2023-09-14 NOTE — PROGRESS NOTES
Office Visit      Patient Name: Rafael Bocanegra  : 1955   MRN: [id number]   Care Team: Patient Care Team:  Shirley Hutchison APRN as PCP - General (Family Medicine)  Je Garg MD as Consulting Physician (Endocrinology)    Chief Complaint  Med Refill, Diabetes, and Leg Pain    Subjective     Subjective      Rafael Bocanegra presents to [facility] for above concerns.   Historically non-compliant with a significant history of uncontrolled type 1 diabetes, hypothyroidism, and nicotine addiction.   Type 1 DM- has not been seen by endocrinology in over 1 year.PCP has been refilling insulin. He reports he has difficulty with transportation to get to [place] for appointments.   Lab Results   Component Value Date    HGBA1C 9.40 (H) 2023     He is taking long acting and meal time insulin as prescribed. He has had some episodes of hypoglycemia- worst incidence was 55. These are intermittent. He does not have log for glucose available today. He does not have a dexcom.   Denies polyuria, polydipsia, polyphagia, vision changes, and foot ulcer/callus. He has not had a recent diabetic eye exam. Reports his sister has been making diabetes friendly meals for him.   He has not had any recent labs but reports he is taking his levothyroxine as prescribed. No new change in bowel habits, temperature intolerance, and is compliant with medication.   He has not had a low dose CT for lung ca screening. Continues to smoke but has cut down to 1 ppd since starting bupropion. Happy with the medication and wishes to continue.   Lab Results   Component Value Date    CHOL 135 2021    CHLPL 116 2022    TRIG 73 2022    HDL 63 (H) 2022    LDL 38 2022     Compliant with atorvastatin. Denies dark urine and myalgia.   He is acutely complaining of bilateral leg pain. Worse when walking, relieved by sitting. No numbness that he has noticed, temperature change,  "trauma/injury, or low back pain.     Objective     Objective   Vital Signs:   /78   Pulse 83   Temp 97 °F (36.1 °C) (Temporal)   Ht 185.2 cm (72.9\")   Wt 70 kg (154 lb 6.4 oz)   SpO2 97%   BMI 20.43 kg/m²     Physical Exam  Vitals and nursing note reviewed.   Constitutional:       General: He is not in acute distress.     Appearance: Normal appearance. He is not ill-appearing (chronically ill appearing) or toxic-appearing.   Eyes:      Pupils: Pupils are equal, round, and reactive to light.   Neck:      Vascular: No carotid bruit.   Cardiovascular:      Rate and Rhythm: Normal rate and regular rhythm.      Pulses:           Dorsalis pedis pulses are 1+ on the right side and 1+ on the left side.        Posterior tibial pulses are 1+ on the right side and 1+ on the left side.      Heart sounds: Normal heart sounds. No murmur heard.  Pulmonary:      Effort: Pulmonary effort is normal. No respiratory distress.      Breath sounds: Normal breath sounds. No wheezing.   Abdominal:      General: Bowel sounds are normal. There is no distension.      Palpations: Abdomen is soft.      Tenderness: There is no abdominal tenderness.   Musculoskeletal:         General: Deformity present.      Cervical back: Neck supple. No tenderness.      Right lower leg: No edema.      Left lower leg: No edema.   Skin:     General: Skin is warm and dry.      Findings: No rash.   Neurological:      General: No focal deficit present.      Mental Status: He is alert.   Psychiatric:         Mood and Affect: Mood normal.         Behavior: Behavior normal.        Assessment / Plan      Assessment & Plan   Problem List Items Addressed This Visit          Endocrine and Metabolic    Acquired hypothyroidism    Relevant Orders    Lipid panel    CBC No Differential    Comprehensive metabolic panel    TSH Rfx On Abnormal To Free T4    Microalbumin / Creatinine Urine Ratio - Urine, Clean Catch    Update labs today, will send refill for " levothyroxine once labs are available for review.                                 Uncontrolled type 1 diabetes mellitus with hypoglycemia without coma    Relevant Medications    Continuous Blood Gluc Sensor (Dexcom G7 Sensor) misc    Continuous Blood Gluc  (Dexcom G7 ) device    Other Relevant Orders    Ambulatory Referral to Case Management HRCM - High Risk Care Management, Medication Adherence    Lipid panel    CBC No Differential    Comprehensive metabolic panel    TSH Rfx On Abnormal To Free T4    Microalbumin / Creatinine Urine Ratio - Urine, Clean Catch    POC Glycosylated Hemoglobin (Hb A1C) (Completed)    Lab Results   Component Value Date    HGBA1C 8.2 09/14/2023     A1C actually improving, again given endocrinology number to reschedule. Continue long acting and meal time insulin as prescribed. Update labs. Dexcom would be preferred, also referred him to case management for help staying compliant etc. Diabetic diet and medication compliance urged. Close follow-up in 1 month for medicare wellness.     Other Visit Diagnoses       Essential hypertension    -  Primary    Relevant Orders    Lipid panel    CBC No Differential    Comprehensive metabolic panel    TSH Rfx On Abnormal To Free T4    Microalbumin / Creatinine Urine Ratio - Urine, Clean Catch    Stable. Continue current medications as prescribed. Recommend DASH diet, moderate-intensity exercises 4-5 times/week, and home blood pressure monitoring.       Cigarette nicotine dependence without complication        Relevant Orders    CT Chest Low Dose Wo    US ankle / brachial indices extremity complete    Low dose CT for lung ca screening. Recommend complete cessation, congratulated on progress thus far.    Claudication        Relevant Orders    US ankle / brachial indices extremity complete    Suspect underlying PAD from nicotine use and uncontrolled diabetes. CHARLEY ordered. Further recommendations once results have been reviewed.              BMI is within normal parameters. No other follow-up for BMI required.      Follow Up   Return in about 4 weeks (around 10/12/2023) for Medicare Wellness.  Patient was given instructions and counseling regarding his condition or for health maintenance advice. Please see specific information pulled into the AVS if appropriate.     ELIAS Ramírez  [facility]

## 2023-09-14 NOTE — TELEPHONE ENCOUNTER
CT Chest Low Dose Wo [[id number]] (Order [id number])  Order    Date: 2022 Department: [facility] Ordering/Authorizing: Shirley Hutchison APRN    This order is too old to be used and has been cancelled.

## 2023-09-15 DIAGNOSIS — R74.8 ELEVATED LIVER ENZYMES: Primary | ICD-10-CM

## 2023-09-15 LAB
ALBUMIN SERPL-MCNC: 5 G/DL (ref 3.5–5.2)
ALBUMIN/GLOB SERPL: 3.1 G/DL
ALP SERPL-CCNC: 82 U/L (ref 39–117)
ALT SERPL-CCNC: 47 U/L (ref 1–41)
AST SERPL-CCNC: 69 U/L (ref 1–40)
BILIRUB SERPL-MCNC: 0.4 MG/DL (ref 0–1.2)
BUN SERPL-MCNC: 7 MG/DL (ref 8–23)
BUN/CREAT SERPL: 7 (ref 7–25)
CALCIUM SERPL-MCNC: 9.5 MG/DL (ref 8.6–10.5)
CHLORIDE SERPL-SCNC: 85 MMOL/L (ref 98–107)
CHOLEST SERPL-MCNC: 202 MG/DL (ref 0–200)
CO2 SERPL-SCNC: 29.6 MMOL/L (ref 22–29)
CREAT SERPL-MCNC: 1 MG/DL (ref 0.76–1.27)
EGFRCR SERPLBLD CKD-EPI 2021: 83 ML/MIN/1.73
ERYTHROCYTE [DISTWIDTH] IN BLOOD BY AUTOMATED COUNT: 15.4 % (ref 12.3–15.4)
GLOBULIN SER CALC-MCNC: 1.6 GM/DL
GLUCOSE SERPL-MCNC: 168 MG/DL (ref 65–99)
HCT VFR BLD AUTO: 34.3 % (ref 37.5–51)
HDLC SERPL-MCNC: 82 MG/DL (ref 40–60)
HGB BLD-MCNC: 12.2 G/DL (ref 13–17.7)
LDLC SERPL CALC-MCNC: 103 MG/DL (ref 0–100)
MCH RBC QN AUTO: 32.4 PG (ref 26.6–33)
MCHC RBC AUTO-ENTMCNC: 35.6 G/DL (ref 31.5–35.7)
MCV RBC AUTO: 91 FL (ref 79–97)
PLATELET # BLD AUTO: 275 10*3/MM3 (ref 140–450)
POTASSIUM SERPL-SCNC: 3.7 MMOL/L (ref 3.5–5.2)
PROT SERPL-MCNC: 6.6 G/DL (ref 6–8.5)
RBC # BLD AUTO: 3.77 10*6/MM3 (ref 4.14–5.8)
SODIUM SERPL-SCNC: 128 MMOL/L (ref 136–145)
T4 FREE SERPL-MCNC: 0.3 NG/DL
TRIGL SERPL-MCNC: 100 MG/DL (ref 0–150)
TSH SERPL DL<=0.005 MIU/L-ACNC: ABNORMAL UIU/ML (ref 0.27–4.2)
UNABLE TO VOID: NORMAL
VLDLC SERPL CALC-MCNC: 17 MG/DL (ref 5–40)
WBC # BLD AUTO: 7.63 10*3/MM3 (ref 3.4–10.8)

## 2023-09-20 ENCOUNTER — PATIENT OUTREACH (OUTPATIENT)
Dept: CASE MANAGEMENT | Facility: OTHER | Age: 68
End: 2023-09-20
Payer: MEDICARE

## 2023-09-20 DIAGNOSIS — E10.65 UNCONTROLLED TYPE 1 DIABETES MELLITUS WITH HYPERGLYCEMIA: Primary | ICD-10-CM

## 2023-09-20 DIAGNOSIS — I10 HYPERTENSION, UNSPECIFIED TYPE: ICD-10-CM

## 2023-09-20 NOTE — OUTREACH NOTE
AMBULATORY CASE MANAGEMENT NOTE    Name and Relationship of Patient/Support Person: Rafael Bocanegra - Self    Patient Outreach    AMB CM outreach with Patient. Purpose and potential benefits of CCM program explained. Patient verbalized understanding and consented to participation.     Patient reports that he checks his BG levels at least 4x a day and the lowest fasting glucose has been 100s and the highest has been 401 in the last two weeks. Patient is agreeable to keeping a written log until the next outreach for review.    Patient resides with his sister and does not drive nor does he have an auto in his name. He is interested in medical transportation if his nephew can not assist with some appointments. Patient declined a referral to [employer] and states that he has tried Nicorette and the patch with no luck and prefers to continue limiting the daily amount of cigarettes.     Patient denies any current unmet needs or concerns. Encouraged to contact endocrinology and schedule his consult; Patient states he will call tomorrow. Provided contact information for [facility] CM and encouraged him and his sister to call for any questions or concerns.    SDOH updated and reviewed with the patient during this program:    Financial Resource Strain: Medium Risk    Difficulty of Paying Living Expenses: Somewhat hard      Physical Activity: Not on file      Food Insecurity: Food Insecurity Present    Worried About Running Out of Food in the Last Year: Sometimes true    Ran Out of Food in the Last Year: Sometimes true      Social Connections: Not on file      Transportation Needs: Unmet Transportation Needs    Lack of Transportation (Medical): Yes    Lack of Transportation (Non-Medical): No      Housing Stability: Low Risk     Unable to Pay for Housing in the Last Year: No    Number of Places Lived in the Last Year: 1    Unstable Housing in the Last Year: No      Stress: Not on file       Adult Patient Profile    Questions/Answers       Flowsheet Row Most Recent Value   Symptoms/Conditions Managed at Home diabetes, type 1   Barriers to Managing Health financial resources, stress of chronic illness   Diabetes Management Strategies blood glucose testing, insulin therapy, medication therapy, routine screenings   Frequency of Blood Glucose Testing before meals and at bedtime   A1C Target 7.0   Usual Blood Glucose 200-300's   Last A1C Result 11.4   Diabetes Self-Management Outcome 2 (bad)   Diabetes Comment Referral to Endocrinology placed on 09/14/23   How to be Addressed Mr. Hall   How Well Do You Speak English? very well   Source of Information patient, family, health record   Primary Source of Support/Comfort sibling(s)   Name of Support/Comfort Primary Source Stephanie   People in Home sibling(s)   Name(s) of People in Home Sister/Stephanie   Family Caregiver if Needed sibling(s)   Family Caregiver Names Sister/Stephanie   Primary Roles/Responsibilities disabled          Education Documentation  Importance of Glycemic Management, taught by Jessica Infante, RN at 9/20/2023  3:24 PM.  Learner: Patient  Readiness: Eager  Method: Explanation  Response: Verbalizes Understanding    Blood Glucose Monitoring, taught by Jessica Infante, RN at 9/20/2023  3:24 PM.  Learner: Patient  Readiness: Eager  Method: Explanation  Response: Verbalizes Understanding    A1C Goal, taught by Jessica Infante, RN at 9/20/2023  3:24 PM.  Learner: Patient  Readiness: Eager  Method: Explanation  Response: Verbalizes Understanding        Jessica BOYD  Ambulatory Case Management    9/20/2023, 15:24 EDT

## 2023-09-28 ENCOUNTER — TELEPHONE (OUTPATIENT)
Dept: CASE MANAGEMENT | Facility: OTHER | Age: 68
End: 2023-09-28
Payer: MEDICARE

## 2023-09-28 ENCOUNTER — PATIENT OUTREACH (OUTPATIENT)
Dept: CASE MANAGEMENT | Facility: OTHER | Age: 68
End: 2023-09-28
Payer: MEDICARE

## 2023-09-28 DIAGNOSIS — I10 HYPERTENSION, UNSPECIFIED TYPE: ICD-10-CM

## 2023-09-28 DIAGNOSIS — E10.65 UNCONTROLLED TYPE 1 DIABETES MELLITUS WITH HYPERGLYCEMIA: Primary | ICD-10-CM

## 2023-09-28 NOTE — TELEPHONE ENCOUNTER
IHGINIO URRUTIA outreach with Patient; unable to leave a voicemail requesting a return call to JOHN Lopez with Chronic Care Management at [phone].

## 2023-09-28 NOTE — OUTREACH NOTE
AMBULATORY CASE MANAGEMENT NOTE    Name and Relationship of Patient/Support Person: Willi-[facility] - Other    Care Coordination    Care coordination with Primary Care and [facility]. HIGINIO URRUTIA contacted [facility] for status of ordered Dexcom 7 and was informed that CGM had been sent to [facility] for fulfillment. Spoke with Willi at [facility] and she reported that no representative had been assigned to the order and that she would initiate processing approval. HIGINIO URRUTIA provided requested CMN for CGMS and supporting documentation to Willi; Fax # [phone] and [phone].     Jessica BOYD  Ambulatory Case Management    9/28/2023, 12:05 EDT

## 2023-09-29 ENCOUNTER — PATIENT OUTREACH (OUTPATIENT)
Dept: CASE MANAGEMENT | Facility: OTHER | Age: 68
End: 2023-09-29
Payer: MEDICARE

## 2023-09-29 DIAGNOSIS — I10 HYPERTENSION, UNSPECIFIED TYPE: ICD-10-CM

## 2023-09-29 DIAGNOSIS — E10.65 UNCONTROLLED TYPE 1 DIABETES MELLITUS WITH HYPERGLYCEMIA: Primary | ICD-10-CM

## 2023-09-29 NOTE — OUTREACH NOTE
[facility] End of Month Documentation    This Chronic Medical Management Care Plan for Rafael Bocanegra, 68 y.o. male, has been a new plan of care implemented; monitored and managed and a new plan of care implemented for the month of September.  A cumulative time of 75  minutes was spent on this patient record this month, including phone call with relative; phone call with patient; electronic communication with primary care provider; chart review, Reviewing CCM, SDOH and BG trends.    Regarding the patient's problems: has Type 1 diabetes mellitus without complication; Moderate persistent asthma without complication; Mixed hyperlipidemia; Acquired hypothyroidism; Vitamin D deficiency; Uncontrolled type 1 diabetes mellitus with hyperglycemia; and Uncontrolled type 1 diabetes mellitus with hypoglycemia without coma on their problem list., the following items were addressed: medical records; medications and any changes can be found within the plan section of the note.  A detailed listing of time spent for chronic care management is tracked within each outreach encounter.  Current medications include:  has a current medication list which includes the following prescription(s): albuterol sulfate hfa, aspirin, atorvastatin, bupropion xl, dexcom g7 sensor, gvoke hypopen 2-pack, insulin glargine, novolin r relion, levothyroxine, onetouch ultra, vitamin d, and vitamin d. and the patient is reported to be patient is compliant with medication protocol,  Medications are reported to be non-effective in controlling symptoms and changes have been made to the medication protocol, A1c 11.4.  Regarding these diagnoses, referrals were made to the following provider(s):  none.  All notes on chart for PCP to review.    The patient was monitored remotely for blood glucose; medications, Pt to log BG results for the next week and review at scheduled outreach.    The patient's physical needs include:  eye care; DME supplies; medication education,  identified care gaps: diabetic eye exam, foot exam and need for CGM.     The patient's mental support needs include:  continued support    The patient's cognitive support needs include:  not applicable    The patient's psychosocial support needs include:  medication management or adherence    The patient's functional needs include: DME, Recommendations for CGM; Dexcom    The patient's environmental needs include:  not applicable, Resides with sister and nephew, does not drive or have an auto in his name    Care Plan overall comments:  consented to CCM; encouraged to contact Endocrinology referral    Refer to previous outreach notes for more information on the areas listed above.    Monthly Billing Diagnoses  (E10.65) Uncontrolled type 1 diabetes mellitus with hyperglycemia    (I10) Hypertension, unspecified type    Medications   Medications have been reconciled    Care Plan progress this month:      Recently Modified Care Plans Updates made since 8/29/2023 12:00 AM       Diabetes Type 1 (Adult)           Problem Priority Last Modified     Glycemic Management (Diabetes, Type 1) --  9/20/2023  3:09 PM by Jessica Infante, JOHN              Goal Recent Progress Last Modified     Glycemic Management Optimized --  9/20/2023  3:09 PM by Jessica Infante, RN     Evidence-based guidance:   Anticipate A1C testing (point-of-care) every 3 to 6 months based on goal attainment.   Review mutually set A1C goal or target range.   Anticipate screening for autoimmune thyroid disease, vitamin B12 deficiency with anemia and celiac disease based on presenting signs/symptoms.   Anticipate use of insulin with periodic adjustments; consider active involvement of pharmacist.   Provide medical nutrition therapy and development of individualized eating plan.   Compare self-reported symptoms of hypo or hyperglycemia to blood glucose levels, diet and fluid intake, current medications, psychosocial and physiologic stressors, change in activity and  barriers to care adherence.   Promote self-monitoring of blood glucose levels, interval or continuous.   Assess and address barriers to management plan, such as food insecurity, age, developmental ability, depression, anxiety, fear of hypoglycemia or weight gain, as well as medication cost, side effects and complicated regimen.   Assess for disordered eating behaviors based on presenting signs/symptoms and risk factors.   Consider referral to community-based diabetes education program, visiting nurse, community health worker or health .   Encourage regular dental care for treatment of periodontal disease; refer to dental provider when needed.    Notes:            Task Due Date Last Modified     Alleviate Barriers to Glycemic Management --  9/20/2023  3:10 PM by Jessica Infante RN     Care Management Activities:      - blood glucose monitoring encouraged  - use of blood glucose monitoring log promoted      Notes:                Problem Priority Last Modified     Disease Progression (Diabetes, Type 1) --  9/20/2023  3:09 PM by Jessica Infante RN              Goal Recent Progress Last Modified     Disease Progression Prevented or Minimized --  9/20/2023  3:09 PM by Jessica Infante RN     Evidence-based guidance:   Prepare patient for laboratory and diagnostic exams based on risk and presentation.   Encourage lifestyle changes, such as increased intake of plant-based foods, stress reduction, regular physical activity and smoking cessation to prevent long-term complications and chronic diseases.   Discourage sedentary behavior or sitting longer than 30 minutes without interruption; individualize exercise or activity recommendations with potential limitations, such as neuropathy, retinopathy or the ability to prevent    hyperglycemia or hypoglycemia.   Prepare patient for use of pharmacologic therapy that may include antihypertensive, analgesic, prostaglandin E1 with periodic adjustments based on presenting  chronic condition and laboratory results.   Assess signs/symptoms and risk factors for hypertension, sleep-disordered breathing, neuropathy (including changes in gait and balance), retinopathy, nephropathy and sexual dysfunction.   Address pregnancy planning and contraceptive choice, especially when prescribing antihypertensive or statin.   Implement additional individualized goals and interventions based on identified risk factors.   Prepare patient for consultation or referral for specialist care, such as ophthalmology, neurology, cardiology, podiatry, nephrology or perinatology.    Notes:            Task Due Date Last Modified     Monitor and Manage Follow-up for Comorbidities --  9/20/2023  3:11 PM by Jessica Infante RN     Care Management Activities:      - healthy lifestyle promoted  - signs/symptoms of comorbidities identified  - vital signs and trends reviewed      Notes:                          Current Specialty Plan of Care Status signed by both patient and provider    Instructions   Patient was provided an electronic copy of care plan  CCM services were explained and offered and patient has accepted these services.  Patient has given their written consent to receive CCM services and understands that this includes the authorization of electronic communication of medical information with the other treating providers.  Patient understands that they may stop CCM services at any time and these changes will be effective at the end of the calendar month and will effectively revocate the agreement of CCM services.  Patient understands that only one practitioner can furnish and be paid for CCM services during one calendar month.  Patient also understands that there may be co-payment or deductible fees in association with CCM services.  Patient will continue with at least monthly follow-up calls with the Ambulatory .    Jessica BOYD  Ambulatory Case Management    9/29/2023, 11:33 EDT

## 2023-10-03 ENCOUNTER — PATIENT OUTREACH (OUTPATIENT)
Dept: CASE MANAGEMENT | Facility: OTHER | Age: 68
End: 2023-10-03
Payer: MEDICARE

## 2023-10-03 DIAGNOSIS — E10.65 UNCONTROLLED TYPE 1 DIABETES MELLITUS WITH HYPERGLYCEMIA: Primary | ICD-10-CM

## 2023-10-03 DIAGNOSIS — I10 HYPERTENSION, UNSPECIFIED TYPE: ICD-10-CM

## 2023-10-03 NOTE — OUTREACH NOTE
"AMBULATORY CASE MANAGEMENT NOTE    Name and Relationship of Patient/Support Person: Daljit Rafael - Self    [facility] Interim Update    [facility] outreach with patient. Patient didn't have blood sugar log at this outreach but encouraged to keep written log. Verbalized understanding. States highest blood sugar was around \"200-300\". States had a episode of low blood sugar a week ago, stated around \"50\" Denies any other episodes of hypoglycemia. Discussed signs/symptoms of hypo/hyperglycemia. States he lives with his sister and he was able to eat something and it came back up. States he is taking his medications and insulin as prescribed. Instructed to check blood sugar before meals/snacks and at bedtime. Encouraged to avoid prolonged fasting. Encouraged adequate water intake. Verbalized understanding. States he has an appointment with endocrinology but is not until 2/19/24. States he was placed on a cancellation list if appt becomes available sooner. Updated on status of CGM. Patient appreciative of call. Next outreach scheduled.     Care Coordination    [facility] outreach with [facility]. Spoke with Sandra  to check on status of CGM. Per review, working on prior authorization for approval.      Education Documentation  Consistent Eating Pattern, taught by Rosemary Bonilla RN at 10/3/2023 11:09 AM.  Learner: Patient  Readiness: Acceptance  Method: Explanation  Response: Verbalizes Understanding, Needs Reinforcement    Healthy Food Choices, taught by Rosemary Bonilla, RN at 10/3/2023 11:09 AM.  Learner: Patient  Readiness: Acceptance  Method: Explanation  Response: Verbalizes Understanding, Needs Reinforcement    Hypoglycemia Identification and Treatment, taught by Rosemary Bonilla RN at 10/3/2023 11:09 AM.  Learner: Patient  Readiness: Acceptance  Method: Explanation  Response: Verbalizes Understanding, Needs Reinforcement    Hyperglycemia Identification and Treatment, taught by Rosemary Bonilla, JOHN at " 10/3/2023 11:09 AM.  Learner: Patient  Readiness: Acceptance  Method: Explanation  Response: Verbalizes Understanding, Needs Reinforcement    Insulin Therapy, taught by Rosemary Bonilla, RN at 10/3/2023 11:09 AM.  Learner: Patient  Readiness: Acceptance  Method: Explanation  Response: Verbalizes Understanding, Needs Reinforcement          Rosemary SALCEDO  Ambulatory Case Management    10/3/2023, 11:11 EDT

## 2023-10-06 DIAGNOSIS — E10.65 TYPE 1 DIABETES MELLITUS WITH HYPERGLYCEMIA: ICD-10-CM

## 2023-10-06 RX ORDER — BLOOD SUGAR DIAGNOSTIC
STRIP MISCELLANEOUS
Qty: 100 EACH | Refills: 0 | Status: SHIPPED | OUTPATIENT
Start: 2023-10-06

## 2023-10-16 ENCOUNTER — PATIENT OUTREACH (OUTPATIENT)
Dept: CASE MANAGEMENT | Facility: OTHER | Age: 68
End: 2023-10-16
Payer: MEDICARE

## 2023-10-16 ENCOUNTER — TELEPHONE (OUTPATIENT)
Dept: CASE MANAGEMENT | Facility: OTHER | Age: 68
End: 2023-10-16
Payer: MEDICARE

## 2023-10-16 DIAGNOSIS — E10.65 UNCONTROLLED TYPE 1 DIABETES MELLITUS WITH HYPERGLYCEMIA: Primary | ICD-10-CM

## 2023-10-16 DIAGNOSIS — I10 HYPERTENSION, UNSPECIFIED TYPE: ICD-10-CM

## 2023-10-16 RX ORDER — ACYCLOVIR 400 MG/1
1 TABLET ORAL
Qty: 1 EACH | Refills: 0 | COMMUNITY
Start: 2023-10-16

## 2023-10-16 NOTE — OUTREACH NOTE
AMBULATORY CASE MANAGEMENT NOTE    Name and Relationship of Patient/Support Person: Rafael Bocanegra - Self    Patient Outreach    AMB CM outreach with Patient. Patient reports that his BG trends have been from 20 to 400. Reports non compliance with recommended dietary guidelines. Stated that he could tell when his BG was dropping but that one incident happened in his sleep. Reinforced keeping glucagon within reach of his bed at night and encouraged family members to be aware of where glucagon was stored and how to use it.     Dexcom 7 arrived this morning and HIGINIO CM assisted Patient and family members with setting up the application to then be informed that Patient's phone is not compatible with the CGMS. The nephew is to stop by the  to  Dexcom 7  sample for Patient. Informed Pt that per conversation with [facility] his CGMS is covered at no cost to the Patient.    Reinforced contacting HIGINIO CM for any questions or concerns.    Care Coordination    Care coordination with DME and front office. HIGINIO URRUTIA spoke with Jud at [facility] and she confirmed that Dexcom 7 shipped on 10/13/23 and should arrive at the home today with no co pay. HIGINIO URRUTIA provided Dexcom 7  to the front office for Patient's nephew to  on this date.    Education Documentation  Monitoring Carbohydrate Intake, taught by Jessica Infante, RN at 10/16/2023 12:10 PM.  Learner: Family, Patient  Readiness: Acceptance  Method: Explanation  Response: Verbalizes Understanding, Needs Reinforcement    Hypoglycemia Identification and Treatment, taught by Jessica Infante, RN at 10/16/2023 12:10 PM.  Learner: Family, Patient  Readiness: Acceptance  Method: Explanation  Response: Verbalizes Understanding, Needs Reinforcement    Hyperglycemia Identification and Treatment, taught by Jessica Infante RN at 10/16/2023 12:10 PM.  Learner: Family, Patient  Readiness: Acceptance  Method: Explanation  Response:  Verbalizes Understanding, Needs Reinforcement        Jessica BOYD  Ambulatory Case Management    10/16/2023, 12:11 EDT

## 2023-10-19 ENCOUNTER — TELEPHONE (OUTPATIENT)
Dept: CASE MANAGEMENT | Facility: OTHER | Age: 68
End: 2023-10-19
Payer: MEDICARE

## 2023-10-19 DIAGNOSIS — I10 HYPERTENSION, UNSPECIFIED TYPE: ICD-10-CM

## 2023-10-19 DIAGNOSIS — E10.65 UNCONTROLLED TYPE 1 DIABETES MELLITUS WITH HYPERGLYCEMIA: Primary | ICD-10-CM

## 2023-10-19 NOTE — TELEPHONE ENCOUNTER
HIGINIO URRUTIA outreach with Patient; returning call to Patient but he did not answer and his VM box was full; unable to leave a message requesting a return call to Jessica RN with Chronic Care Management at [phone].

## 2023-10-19 NOTE — TELEPHONE ENCOUNTER
HIGINIO URRUTIA attempted to return call to Patient but Patient did not answer and his voicemail box was full and not accepting messages; unable to request a return call to JOHN Lopez Care Manager at [phone].

## 2023-10-24 ENCOUNTER — OFFICE VISIT (OUTPATIENT)
Dept: INTERNAL MEDICINE | Facility: CLINIC | Age: 68
End: 2023-10-24
Payer: MEDICARE

## 2023-10-24 VITALS
DIASTOLIC BLOOD PRESSURE: 82 MMHG | BODY MASS INDEX: 19.48 KG/M2 | SYSTOLIC BLOOD PRESSURE: 126 MMHG | OXYGEN SATURATION: 99 % | HEIGHT: 73 IN | HEART RATE: 99 BPM | WEIGHT: 147 LBS | TEMPERATURE: 98.5 F

## 2023-10-24 DIAGNOSIS — E10.65 UNCONTROLLED TYPE 1 DIABETES MELLITUS WITH HYPERGLYCEMIA: ICD-10-CM

## 2023-10-24 DIAGNOSIS — Z72.0 TOBACCO ABUSE: ICD-10-CM

## 2023-10-24 DIAGNOSIS — E03.9 ACQUIRED HYPOTHYROIDISM: ICD-10-CM

## 2023-10-24 DIAGNOSIS — E78.2 MIXED HYPERLIPIDEMIA: ICD-10-CM

## 2023-10-24 DIAGNOSIS — R25.2 FOOT CRAMPS: ICD-10-CM

## 2023-10-24 DIAGNOSIS — Z71.6 TOBACCO ABUSE COUNSELING: ICD-10-CM

## 2023-10-24 DIAGNOSIS — Z00.00 ENCOUNTER FOR SUBSEQUENT ANNUAL WELLNESS VISIT (AWV) IN MEDICARE PATIENT: Primary | ICD-10-CM

## 2023-10-24 DIAGNOSIS — Z23 NEED FOR IMMUNIZATION AGAINST INFLUENZA: ICD-10-CM

## 2023-10-24 LAB
ALBUMIN SERPL-MCNC: 4.2 G/DL (ref 3.5–5.2)
ALBUMIN/GLOB SERPL: 3.2 G/DL
ALP SERPL-CCNC: 69 U/L (ref 39–117)
ALT SERPL-CCNC: 10 U/L (ref 1–41)
AST SERPL-CCNC: 11 U/L (ref 1–40)
BILIRUB SERPL-MCNC: 0.3 MG/DL (ref 0–1.2)
BUN SERPL-MCNC: 6 MG/DL (ref 8–23)
BUN/CREAT SERPL: 8.7 (ref 7–25)
CALCIUM SERPL-MCNC: 9.1 MG/DL (ref 8.6–10.5)
CHLORIDE SERPL-SCNC: 99 MMOL/L (ref 98–107)
CO2 SERPL-SCNC: 28.5 MMOL/L (ref 22–29)
CREAT SERPL-MCNC: 0.69 MG/DL (ref 0.76–1.27)
EGFRCR SERPLBLD CKD-EPI 2021: 102.1 ML/MIN/1.73
GLOBULIN SER CALC-MCNC: 1.3 GM/DL
GLUCOSE SERPL-MCNC: 142 MG/DL (ref 65–99)
MAGNESIUM SERPL-MCNC: 1.7 MG/DL (ref 1.6–2.4)
POTASSIUM SERPL-SCNC: 3.8 MMOL/L (ref 3.5–5.2)
PROT SERPL-MCNC: 5.5 G/DL (ref 6–8.5)
SODIUM SERPL-SCNC: 138 MMOL/L (ref 136–145)

## 2023-10-24 PROCEDURE — 99214 OFFICE O/P EST MOD 30 MIN: CPT | Performed by: NURSE PRACTITIONER

## 2023-10-24 PROCEDURE — 1160F RVW MEDS BY RX/DR IN RCRD: CPT | Performed by: NURSE PRACTITIONER

## 2023-10-24 PROCEDURE — 3052F HG A1C>EQUAL 8.0%<EQUAL 9.0%: CPT | Performed by: NURSE PRACTITIONER

## 2023-10-24 PROCEDURE — 1159F MED LIST DOCD IN RCRD: CPT | Performed by: NURSE PRACTITIONER

## 2023-10-24 PROCEDURE — G0439 PPPS, SUBSEQ VISIT: HCPCS | Performed by: NURSE PRACTITIONER

## 2023-10-24 RX ORDER — GLUCAGON INJECTION, SOLUTION 0.5 MG/.1ML
1 INJECTION, SOLUTION SUBCUTANEOUS DAILY PRN
Qty: 0.2 ML | Refills: 1 | Status: SHIPPED | OUTPATIENT
Start: 2023-10-24

## 2023-10-24 RX ORDER — ASPIRIN 81 MG/1
81 TABLET ORAL DAILY
Qty: 90 TABLET | Refills: 1 | Status: SHIPPED | OUTPATIENT
Start: 2023-10-24

## 2023-10-24 RX ORDER — BUPROPION HYDROCHLORIDE 300 MG/1
300 TABLET ORAL DAILY
Qty: 90 TABLET | Refills: 1 | Status: SHIPPED | OUTPATIENT
Start: 2023-10-24

## 2023-10-24 RX ORDER — LEVOTHYROXINE SODIUM 0.2 MG/1
200 TABLET ORAL DAILY
Qty: 90 TABLET | Refills: 1 | Status: SHIPPED | OUTPATIENT
Start: 2023-10-24

## 2023-10-24 RX ORDER — ATORVASTATIN CALCIUM 10 MG/1
10 TABLET, FILM COATED ORAL NIGHTLY
Qty: 90 TABLET | Refills: 1 | Status: SHIPPED | OUTPATIENT
Start: 2023-10-24

## 2023-10-24 NOTE — PROGRESS NOTES
The ABCs of the Annual Wellness Visit  Subsequent Medicare Wellness Visit    Subjective    Rafael Bocanegra is a 68 y.o. male who presents for a Subsequent Medicare Wellness Visit.    The following portions of the patient's history were reviewed and   updated as appropriate: allergies, current medications, past family history, past medical history, past social history, past surgical history, and problem list.    Compared to one year ago, the patient feels his physical   health is the same.    Compared to one year ago, the patient feels his mental   health is better.    Recent Hospitalizations:  He was not admitted to the hospital during the last year.       Current Medical Providers:  Patient Care Team:  Shirley Hutchison APRN as PCP - General (Family Medicine)  Je Garg MD as Consulting Physician (Endocrinology)  Jessica Infante, JOHN as Ambulatory  ([facility])  Rosemary Bonilla, JOHN as Ambulatory  ([facility])    Outpatient Medications Prior to Visit   Medication Sig Dispense Refill    albuterol sulfate  (90 Base) MCG/ACT inhaler Inhale 2 puffs Every 4 (Four) Hours As Needed for Wheezing or Shortness of Air. 18 g 11    Continuous Blood Gluc  (Dexcom G7 ) device Use 1 Units Every 5 (Five) Years. 1 each 0    Continuous Blood Gluc Sensor (Dexcom G7 Sensor) misc Use 1 each Every 30 (Thirty) Days. 2 each 12    insulin regular (NovoLIN R ReliOn) 100 UNIT/ML injection Take 1u per 15g CHO TID with meals plus correction 1u per 50 over 150; max daily dose 30u 30 mL 12    OneTouch Ultra test strip USE 1 STRIP TO CHECK GLUCOSE THREE TIMES DAILY 100 each 0    vitamin D (ERGOCALCIFEROL) 1.25 MG (85356 UT) capsule capsule Take 1 capsule by mouth 1 (One) Time Per Week. 12 capsule 0    VITAMIN D PO Take  by mouth.      aspirin 81 MG EC tablet Take 1 tablet by mouth Daily. 30 tablet 0    atorvastatin (LIPITOR) 10 MG tablet Take 1 tablet by mouth Every Night.  "30 tablet 0    buPROPion XL (WELLBUTRIN XL) 150 MG 24 hr tablet Take 1 tablet by mouth Daily. 30 tablet 0    Glucagon (Gvoke HypoPen 2-Pack) 0.5 MG/0.1ML solution auto-injector Inject 1 application under the skin into the appropriate area as directed Daily As Needed (low blood sugar). 0.2 mL 1    Insulin Glargine (LANTUS SOLOSTAR) 100 UNIT/ML injection pen Inject 12 Units under the skin into the appropriate area as directed 2 (Two) Times a Day. 27 mL 1    levothyroxine (SYNTHROID, LEVOTHROID) 200 MCG tablet Take 1 tablet by mouth Daily. 30 tablet 0     No facility-administered medications prior to visit.       No opioid medication identified on active medication list. I have reviewed chart for other potential  high risk medication/s and harmful drug interactions in the elderly.        Aspirin is on active medication list. Aspirin use is indicated based on review of current medical condition/s. Pros and cons of this therapy have been discussed today. Benefits of this medication outweigh potential harm.  Patient has been encouraged to continue taking this medication.  .      Patient Active Problem List   Diagnosis    Type 1 diabetes mellitus without complication    Moderate persistent asthma without complication    Mixed hyperlipidemia    Acquired hypothyroidism    Vitamin D deficiency    Uncontrolled type 1 diabetes mellitus with hyperglycemia    Uncontrolled type 1 diabetes mellitus with hypoglycemia without coma     Advance Care Planning   Advance Care Planning     Advance Directive is not on file.  ACP discussion was held with the patient during this visit. Patient does not have an advance directive, information provided.     Objective    Vitals:    10/24/23 0827   BP: 126/82   Pulse: 99   Temp: 98.5 °F (36.9 °C)   SpO2: 99%   Weight: 66.7 kg (147 lb)   Height: 185.2 cm (72.91\")   PainSc:   6     Estimated body mass index is 19.44 kg/m² as calculated from the following:    Height as of this encounter: 185.2 cm " "(72.91\").    Weight as of this encounter: 66.7 kg (147 lb).    BMI is within normal parameters. No other follow-up for BMI required.      Does the patient have evidence of cognitive impairment? Yes    Lab Results   Component Value Date    CHLPL 202 (H) 09/14/2023    TRIG 100 09/14/2023    HDL 82 (H) 09/14/2023     (H) 09/14/2023    VLDL 17 09/14/2023    HGBA1C 8.2 09/14/2023        HEALTH RISK ASSESSMENT    Smoking Status:  Social History     Tobacco Use   Smoking Status Every Day    Packs/day: 2.00    Years: 47.00    Additional pack years: 0.00    Total pack years: 94.00    Types: Cigarettes   Smokeless Tobacco Never     Alcohol Consumption:  Social History     Substance and Sexual Activity   Alcohol Use Never     Fall Risk Screen:    MIRNA Fall Risk Assessment was completed, and patient is at MODERATE risk for falls. Assessment completed on:10/24/2023    Depression Screening:      10/24/2023     8:32 AM   PHQ-2/PHQ-9 Depression Screening   Little Interest or Pleasure in Doing Things 0-->not at all   Feeling Down, Depressed or Hopeless 0-->not at all   PHQ-9: Brief Depression Severity Measure Score 0       Health Habits and Functional and Cognitive Screening:      10/24/2023     8:32 AM   Functional & Cognitive Status   Do you have difficulty preparing food and eating? No   Do you have difficulty bathing yourself, getting dressed or grooming yourself? No   Do you have difficulty using the toilet? No   Do you have difficulty moving around from place to place? No   Do you have trouble with steps or getting out of a bed or a chair? No   Current Diet Well Balanced Diet   Dental Exam Up to date   Eye Exam Not up to date   Exercise (times per week) 7 times per week   Current Exercises Include Walking   Do you need help using the phone?  No   Are you deaf or do you have serious difficulty hearing?  No   Do you need help to go to places out of walking distance? Yes   Do you need help shopping? No   Do you need help " preparing meals?  No   Do you need help with housework?  No   Do you need help with laundry? No   Do you need help taking your medications? No   Do you need help managing money? No   Do you ever drive or ride in a car without wearing a seat belt? No   Have you felt unusual stress, anger or loneliness in the last month? No   Who do you live with? Sibling   If you need help, do you have trouble finding someone available to you? No   Have you been bothered in the last four weeks by sexual problems? No   Do you have difficulty concentrating, remembering or making decisions? No       Age-appropriate Screening Schedule:  Refer to the list below for future screening recommendations based on patient's age, sex and/or medical conditions. Orders for these recommended tests are listed in the plan section. The patient has been provided with a written plan.    Health Maintenance   Topic Date Due    COLORECTAL CANCER SCREENING  Never done    LUNG CANCER SCREENING  Never done    DIABETIC EYE EXAM  02/01/2021    DIABETIC FOOT EXAM  03/24/2022    AAA SCREEN (ONE-TIME)  Never done    URINE MICROALBUMIN  09/02/2022    INFLUENZA VACCINE  08/01/2023    ZOSTER VACCINE (1 of 2) 02/24/2024 (Originally 1/14/2007)    COVID-19 Vaccine (1) 02/26/2024 (Originally 1955)    TDAP/TD VACCINES (1 - Tdap) 10/24/2024 (Originally 1/14/1976)    HEMOGLOBIN A1C  03/14/2024    LIPID PANEL  09/14/2024    ANNUAL WELLNESS VISIT  10/24/2024    HEPATITIS C SCREENING  Completed    Pneumococcal Vaccine 65+  Completed                  CMS Preventative Services Quick Reference  Risk Factors Identified During Encounter  Depression/Dysphoria: Current medication is effective, no change recommended  Immunizations Discussed/Encouraged: Influenza and Prevnar 20 (Pneumococcal 20-valent conjugate)  Inactivity/Sedentary: Patient was advised to exercise at least 150 minutes a week per CDC recommendations.  Tobacco Use/Dependance Risk (use dotphrase .tobaccocessation for  "documentation)  The above risks/problems have been discussed with the patient.  Pertinent information has been shared with the patient in the After Visit Summary.  An After Visit Summary and PPPS were made available to the patient.    Follow Up:   Next Medicare Wellness visit to be scheduled in 1 year.       Additional E&M Note during same encounter follows:  Patient has multiple medical problems which are significant and separately identifiable that require additional work above and beyond the Medicare Wellness Visit.      Chief Complaint  Medicare Wellness-subsequent    Subjective        HPI  Rafael Bocanegra is also being seen today for subsequent medicare exam.    T1DM: Has not had to use glucagon.  Takes Lantus, NovoLog as prescribed.  Continues to have glucose > 300 regularly.  Does not eat a healthy diet. Patient denies foot ulcerations, hypoglycemia, nausea, paresthesia of the feet, polydipsia, polyuria, polyphagia, visual disturbances.     Hypothyroid: Taking levothyroxine 200 mcg daily as prescribed.  He has lost weight since his last appointment.  Feels it is due to poor appetite, poor diet. Denies temperature intolerance, hair/skin/nail changes, bowel habit changes, palpitations, anxiety, sore throat, hoarseness.     Missed appointment to establish care with endocrinology.  Another appointment scheduled in February, 2024.  Wants to wait for CT's and appt with endocrinology before scheduling eye exam, colonoscopy, AAA screening.          Review of Systems   All other systems reviewed and are negative.      Objective   Vital Signs:  /82   Pulse 99   Temp 98.5 °F (36.9 °C)   Ht 185.2 cm (72.91\")   Wt 66.7 kg (147 lb)   SpO2 99%   BMI 19.44 kg/m²     Physical Exam  Constitutional:       Appearance: He is well-developed. He is not ill-appearing.   HENT:      Head: Normocephalic.      Right Ear: Tympanic membrane, ear canal and external ear normal.      Left Ear: Tympanic membrane, ear canal and " external ear normal.      Nose: Nose normal.      Mouth/Throat:      Mouth: Mucous membranes are moist.      Pharynx: Oropharynx is clear. Uvula midline.   Eyes:      Extraocular Movements: Extraocular movements intact.      Conjunctiva/sclera: Conjunctivae normal.      Pupils: Pupils are equal, round, and reactive to light.   Neck:      Thyroid: No thyromegaly.      Vascular: No carotid bruit.   Cardiovascular:      Rate and Rhythm: Normal rate and regular rhythm.      Pulses:           Radial pulses are 2+ on the right side and 2+ on the left side.        Dorsalis pedis pulses are 2+ on the right side and 2+ on the left side.      Heart sounds: Normal heart sounds.   Pulmonary:      Effort: Pulmonary effort is normal.      Breath sounds: Normal breath sounds.   Abdominal:      General: Bowel sounds are normal.      Palpations: Abdomen is soft.      Tenderness: There is no abdominal tenderness.   Musculoskeletal:         General: No tenderness or deformity. Normal range of motion.      Cervical back: Full passive range of motion without pain, normal range of motion and neck supple.      Right lower leg: No edema.      Left lower leg: No edema.   Lymphadenopathy:      Cervical: No cervical adenopathy.   Skin:     General: Skin is warm.      Capillary Refill: Capillary refill takes less than 2 seconds.   Neurological:      Mental Status: He is alert and oriented to person, place, and time.      Sensory: No sensory deficit.      Coordination: Coordination normal.      Gait: Gait normal.      Comments: CN II-XII normal   Psychiatric:         Attention and Perception: Attention normal.         Mood and Affect: Mood and affect normal.         Speech: Speech normal.         Behavior: Behavior normal.         Thought Content: Thought content normal.                         Assessment and Plan   Diagnoses and all orders for this visit:    1. Encounter for subsequent annual wellness visit (AWV) in Medicare patient  (Primary)    2. Mixed hyperlipidemia  -     atorvastatin (LIPITOR) 10 MG tablet; Take 1 tablet by mouth Every Night.  Dispense: 90 tablet; Refill: 1  -     aspirin 81 MG EC tablet; Take 1 tablet by mouth Daily.  Dispense: 90 tablet; Refill: 1        - Follow heart healthy diet.  Keep sodium intake < 1500 mg per day.  Avoid processed & fast foods.          - Exercise as tolerated, with a goal of 30 minutes of moderate exercise most days.         - Take medications as prescribed.    3. Tobacco abuse  -     buPROPion XL (WELLBUTRIN XL) 300 MG 24 hr tablet; Take 1 tablet by mouth Daily.  Dispense: 90 tablet; Refill: 1  4. Tobacco abuse counseling  -     buPROPion XL (WELLBUTRIN XL) 300 MG 24 hr tablet; Take 1 tablet by mouth Daily.  Dispense: 90 tablet; Refill: 1    5. Acquired hypothyroidism  -     levothyroxine (SYNTHROID, LEVOTHROID) 200 MCG tablet; Take 1 tablet by mouth Daily.  Dispense: 90 tablet; Refill: 1    6. Uncontrolled type 1 diabetes mellitus with hyperglycemia  -     Glucagon (Gvoke HypoPen 2-Pack) 0.5 MG/0.1ML solution auto-injector; Inject 1 application  under the skin into the appropriate area as directed Daily As Needed (low blood sugar).  Dispense: 0.2 mL; Refill: 1        - Follow diabetic diet        - Monitor blood sugars as discussed, to better assess trends and glycemic response to certain food, drink, activities.  Advised fasting blood glucose should be < 130, 2 hours post-prandial should be < 180        - See eye doctor annually or as discussed        - Wear protective foot wear/no bare feet        - Check feet regularly for calluses or ulcers        - Discussed risk of poorly controlled diabetes and long-term complications        - Exercise as tolerated for 30-45 minutes most days of the week. Gradually increase daily exercise if not currently active.        - Take all medications as prescribed    7. Foot cramps  -     Magnesium  -     Comprehensive Metabolic Panel    8. Need for immunization  against influenza  -     Fluzone High-Dose 65+yrs ([id number])               Follow Up   Return in about 6 months (around 4/24/2024) for Next scheduled follow up.  Patient was given instructions and counseling regarding his condition or for health maintenance advice. Please see specific information pulled into the AVS if appropriate.

## 2023-10-25 NOTE — PROGRESS NOTES
Protein is a bit low.  Appears he is slightly malnourished.  Should eat a bit more protein, well balanced with vegetables.  Otherwise, labs are normal/stable.

## 2023-10-31 ENCOUNTER — PATIENT OUTREACH (OUTPATIENT)
Dept: CASE MANAGEMENT | Facility: OTHER | Age: 68
End: 2023-10-31
Payer: MEDICARE

## 2023-10-31 DIAGNOSIS — I10 HYPERTENSION, UNSPECIFIED TYPE: ICD-10-CM

## 2023-10-31 DIAGNOSIS — E10.65 UNCONTROLLED TYPE 1 DIABETES MELLITUS WITH HYPERGLYCEMIA: Primary | ICD-10-CM

## 2023-10-31 NOTE — OUTREACH NOTE
CCM End of Month Documentation    This Chronic Medical Management Care Plan for Rafael Bocanegra, 68 y.o. male, has been monitored and managed and a new plan of care implemented for the month of October.  A cumulative time of 82  minutes was spent on this patient record this month, including phone call with patient; phone call with relative; electronic communication with patient; chart review; phone call with pharmacist, BG trends and Dexcom 7 set up.    Regarding the patient's problems: has Type 1 diabetes mellitus without complication; Moderate persistent asthma without complication; Mixed hyperlipidemia; Acquired hypothyroidism; Vitamin D deficiency; Uncontrolled type 1 diabetes mellitus with hyperglycemia; and Uncontrolled type 1 diabetes mellitus with hypoglycemia without coma on their problem list., the following items were addressed: medical records; medications, CGMS and Hypoglycemia and any changes can be found within the plan section of the note.  A detailed listing of time spent for chronic care management is tracked within each outreach encounter.  Current medications include:  has a current medication list which includes the following prescription(s): albuterol sulfate hfa, aspirin, atorvastatin, bupropion xl, dexcom g7 , dexcom g7 sensor, gvoke hypopen 2-pack, insulin glargine, novolin r relion, levothyroxine, onetouch ultra, vitamin d, and vitamin d. and the patient is reported to be patient is compliant with medication protocol,  Medications are reported to be non-effective in controlling symptoms and changes have been made to the medication protocol, A1c 11.4; reports BG trends from 20 to 401.  Regarding these diagnoses, referrals were made to the following provider(s):  None.  All notes on chart for PCP to review.    The patient was monitored remotely for blood glucose; medications, BG trends from 20 to 401; Dexcom 7 arrived today, Nephew to   at .    The patient's  physical needs include:  eye care; medication education, identified care gaps: diabetic eye exam, foot exam.     The patient's mental support needs include:  continued support    The patient's cognitive support needs include:  not applicable    The patient's psychosocial support needs include:  medication management or adherence    The patient's functional needs include: DME, Additional education/instruction on how to work Dexcom 7    The patient's environmental needs include:  not applicable, Resides with sister and nephew, does not drive or have an auto in his name    Care Plan overall comments:  Dexcom 7 arrived,  sample left at ; needs to schedule with endocrinology    Refer to previous outreach notes for more information on the areas listed above.    Monthly Billing Diagnoses  (E10.65) Uncontrolled type 1 diabetes mellitus with hyperglycemia    (I10) Hypertension, unspecified type    Medications   Medications have been reconciled    Care Plan progress this month:      Recently Modified Care Plans Updates made since 9/30/2023 12:00 AM       Diabetes Type 1 (Adult)           Problem Priority Last Modified     Glycemic Management (Diabetes, Type 1) --  9/20/2023  3:09 PM by Jessica Infante, JOHN              Goal Recent Progress Last Modified     Glycemic Management Optimized --  9/20/2023  3:09 PM by Jessica Infante, RN     Evidence-based guidance:   Anticipate A1C testing (point-of-care) every 3 to 6 months based on goal attainment.   Review mutually set A1C goal or target range.   Anticipate screening for autoimmune thyroid disease, vitamin B12 deficiency with anemia and celiac disease based on presenting signs/symptoms.   Anticipate use of insulin with periodic adjustments; consider active involvement of pharmacist.   Provide medical nutrition therapy and development of individualized eating plan.   Compare self-reported symptoms of hypo or hyperglycemia to blood glucose levels, diet and fluid  intake, current medications, psychosocial and physiologic stressors, change in activity and barriers to care adherence.   Promote self-monitoring of blood glucose levels, interval or continuous.   Assess and address barriers to management plan, such as food insecurity, age, developmental ability, depression, anxiety, fear of hypoglycemia or weight gain, as well as medication cost, side effects and complicated regimen.   Assess for disordered eating behaviors based on presenting signs/symptoms and risk factors.   Consider referral to community-based diabetes education program, visiting nurse, community health worker or health .   Encourage regular dental care for treatment of periodontal disease; refer to dental provider when needed.    Notes:            Task Due Date Last Modified     Alleviate Barriers to Glycemic Management --  10/16/2023 12:03 PM by Jessica Infante, JOHN     Care Management Activities:      - blood glucose readings reviewed  - resources required to improve adherence to care identified  - self-awareness of signs/symptoms of hypo or hyperglycemia encouraged  - use of blood glucose monitoring log promoted      Notes:   10/16/23: Dexcom 7 approved with no co pay; arrived in the mail this AM. Pt phone not compatible with ; nephew to  a Dexcom 7  sample (left at the ) from the office.               Problem Priority Last Modified     Disease Progression (Diabetes, Type 1) --  9/20/2023  3:09 PM by Jessica Infante RN              Goal Recent Progress Last Modified     Disease Progression Prevented or Minimized --  9/20/2023  3:09 PM by Jessica Infante, RN     Evidence-based guidance:   Prepare patient for laboratory and diagnostic exams based on risk and presentation.   Encourage lifestyle changes, such as increased intake of plant-based foods, stress reduction, regular physical activity and smoking cessation to prevent long-term complications and chronic diseases.    Discourage sedentary behavior or sitting longer than 30 minutes without interruption; individualize exercise or activity recommendations with potential limitations, such as neuropathy, retinopathy or the ability to prevent    hyperglycemia or hypoglycemia.   Prepare patient for use of pharmacologic therapy that may include antihypertensive, analgesic, prostaglandin E1 with periodic adjustments based on presenting chronic condition and laboratory results.   Assess signs/symptoms and risk factors for hypertension, sleep-disordered breathing, neuropathy (including changes in gait and balance), retinopathy, nephropathy and sexual dysfunction.   Address pregnancy planning and contraceptive choice, especially when prescribing antihypertensive or statin.   Implement additional individualized goals and interventions based on identified risk factors.   Prepare patient for consultation or referral for specialist care, such as ophthalmology, neurology, cardiology, podiatry, nephrology or perinatology.    Notes:            Task Due Date Last Modified     Monitor and Manage Follow-up for Comorbidities --  10/16/2023 12:03 PM by Jessica Infante RN     Care Management Activities:      - healthy lifestyle promoted  - vital signs and trends reviewed      Notes:                          Current Specialty Plan of Care Status signed by both patient and provider    Instructions   Patient was provided an electronic copy of care plan  CCM services were explained and offered and patient has accepted these services.  Patient has given their written consent to receive CCM services and understands that this includes the authorization of electronic communication of medical information with the other treating providers.  Patient understands that they may stop CCM services at any time and these changes will be effective at the end of the calendar month and will effectively revocate the agreement of CCM services.  Patient understands that only  one practitioner can furnish and be paid for CCM services during one calendar month.  Patient also understands that there may be co-payment or deductible fees in association with CCM services.  Patient will continue with at least monthly follow-up calls with the Ambulatory .    Jessica BOYD  Ambulatory Case Management    10/31/2023, 10:48 EDT

## 2023-11-07 ENCOUNTER — TELEPHONE (OUTPATIENT)
Dept: CASE MANAGEMENT | Facility: OTHER | Age: 68
End: 2023-11-07
Payer: MEDICARE

## 2023-11-07 ENCOUNTER — PATIENT OUTREACH (OUTPATIENT)
Dept: CASE MANAGEMENT | Facility: OTHER | Age: 68
End: 2023-11-07
Payer: MEDICARE

## 2023-11-07 DIAGNOSIS — I10 HYPERTENSION, UNSPECIFIED TYPE: ICD-10-CM

## 2023-11-07 DIAGNOSIS — E10.65 UNCONTROLLED TYPE 1 DIABETES MELLITUS WITH HYPERGLYCEMIA: Primary | ICD-10-CM

## 2023-11-07 NOTE — OUTREACH NOTE
AMBULATORY CASE MANAGEMENT NOTE    Name and Relationship of Patient/Support Person: Rafael Bocanegra - Self    [facility] outreach with patient. Patient reports family member picked up Dexcom 7  and has been set up. Patient expressed appreciation for CGM and how helpful it has been monitoring his blood sugars. Reports a trend of anywhere from 40 to 365. States he is mainly having hypoglycemia at nighttime in his sleep. States taking his medication as prescribed. Discussed appropriate bedtime snack and monitoring blood sugar before sleep. Encouraged adequate water intake and avoid prolonged fasting periods. Verbalized understanding. Also, instructed to keep glucagon kit within reach. Discussed labs and low protein. Encouraged to eat a little bit more protein, well balanced with veggies, per provider note. Verbalized understanding. Patient does have appt with endocrinology on 2/19/24    Discussed upcoming appointments on 11/17 at 4pm and transportation with patient.   Spoke with patients sister, stated she or nephew drive patient to appointments. Discussed possible need for transportation. Will send referral to  to help assist     Care Coordination    Referral sen to [facility] to assist with transportation.       Education Documentation  Glucagon Kit, taught by Rosemary Bonilla, RN at 11/7/2023  1:28 PM.  Learner: Patient  Readiness: Acceptance  Method: Explanation  Response: Needs Reinforcement, Verbalizes Understanding    Hypoglycemia Identification and Treatment, taught by Rosemary Bonilla RN at 11/7/2023  1:28 PM.  Learner: Patient  Readiness: Acceptance  Method: Explanation  Response: Needs Reinforcement, Verbalizes Understanding    Hyperglycemia Identification and Treatment, taught by Rosemary Bonilla RN at 11/7/2023  1:28 PM.  Learner: Patient  Readiness: Acceptance  Method: Explanation  Response: Needs Reinforcement, Verbalizes Understanding    Insulin Therapy, taught by Rosemary Bonilla, RN at 11/7/2023  1:28  PM.  Learner: Patient  Readiness: Acceptance  Method: Explanation  Response: Needs Reinforcement, Verbalizes Understanding          Rosemary SALCEDO  Ambulatory Case Management    11/7/2023, 13:28 EST

## 2023-11-14 ENCOUNTER — PATIENT OUTREACH (OUTPATIENT)
Dept: CASE MANAGEMENT | Facility: OTHER | Age: 68
End: 2023-11-14
Payer: MEDICARE

## 2023-11-14 NOTE — OUTREACH NOTE
SW made out reach attempt, VM box full.     Laura SALCEDO -   Ambulatory Case Management    11/14/2023, 15:57 EST

## 2023-11-16 ENCOUNTER — PATIENT OUTREACH (OUTPATIENT)
Dept: CASE MANAGEMENT | Facility: OTHER | Age: 68
End: 2023-11-16
Payer: MEDICARE

## 2023-11-16 NOTE — OUTREACH NOTE
Social Work Assessment  Questions/Answers      Flowsheet Row Most Recent Value   Referral Source physician   Reason for Consult community resources   Preferred Language English   Advance Care Planning Reviewed no concerns identified   People in Home sibling(s)   Current Living Arrangements apartment   Potentially Unsafe Housing Conditions none   In the past 12 months has the electric, gas, oil, or water company threatened to shut off services in your home? No   Primary Care Provided by self   Provides Primary Care For no one   Family Caregiver if Needed sibling(s)   Quality of Family Relationships supportive, involved, helpful   Employment Status retired   Source of Income social security   Application for Public Assistance not applied   Medications independent   Meal Preparation independent   Housekeeping independent   Laundry independent   Shopping independent        SDOH updated and reviewed with the patient during this program:  Financial Resource Strain: Medium Risk (9/20/2023)    Overall Financial Resource Strain (CARDIA)     Difficulty of Paying Living Expenses: Somewhat hard      Food Insecurity: Food Insecurity Present (9/20/2023)    Hunger Vital Sign     Worried About Running Out of Food in the Last Year: Sometimes true     Ran Out of Food in the Last Year: Sometimes true      Transportation Needs: Unmet Transportation Needs (9/20/2023)    PRAPARE - Transportation     Lack of Transportation (Medical): Yes     Lack of Transportation (Non-Medical): No      Housing Stability: Low Risk  (11/16/2023)    Housing Stability Vital Sign     Unable to Pay for Housing in the Last Year: No     Number of Places Lived in the Last Year: 1     Unstable Housing in the Last Year: No     Patient Outreach    SW contacted pt after receiving referral from nurse GHADA. Pt reports an issue with transport. Pt as an appointment tomorrow for several test and has not way to get there. SW discussed options, but unable to find transport  for tomorrow. The bus runs near his residence, but it stops running at 5PM and pt would have no way home. Medicaid transport has to be scheduled three days in advance. Pt says he will reschedule appointment.  Care Coordination    SW contacted [employer] and confirmed that pt is eligable for medicaid transport. SW attempted to call pt to provide an update, no answer and voicemail box full.  Laura SALCEDO -   Ambulatory Case Management    11/16/2023, 14:33 EST

## 2023-11-30 ENCOUNTER — PATIENT OUTREACH (OUTPATIENT)
Dept: CASE MANAGEMENT | Facility: OTHER | Age: 68
End: 2023-11-30
Payer: MEDICARE

## 2023-11-30 DIAGNOSIS — I10 HYPERTENSION, UNSPECIFIED TYPE: ICD-10-CM

## 2023-11-30 DIAGNOSIS — E10.65 UNCONTROLLED TYPE 1 DIABETES MELLITUS WITH HYPERGLYCEMIA: Primary | ICD-10-CM

## 2023-11-30 NOTE — OUTREACH NOTE
[facility] End of Month Documentation    This Chronic Medical Management Care Plan for Rafael Bocanegra, 68 y.o. male, has been reviewed; monitored and managed and a new plan of care implemented for the month of November.  A cumulative time of 25  minutes was spent on this patient record this month, including phone call with patient; phone call with relative; chart review; electronic communication with other providers, BG trends.    Regarding the patient's problems: has Type 1 diabetes mellitus without complication; Moderate persistent asthma without complication; Mixed hyperlipidemia; Acquired hypothyroidism; Vitamin D deficiency; Uncontrolled type 1 diabetes mellitus with hyperglycemia; and Uncontrolled type 1 diabetes mellitus with hypoglycemia without coma on their problem list., the following items were addressed: medical records; medications, CGMS and Hypoglycemia and any changes can be found within the plan section of the note.  A detailed listing of time spent for chronic care management is tracked within each outreach encounter.  Current medications include:  has a current medication list which includes the following prescription(s): albuterol sulfate hfa, aspirin, atorvastatin, bupropion xl, dexcom g7 , dexcom g7 sensor, gvoke hypopen 2-pack, insulin glargine, novolin r relion, levothyroxine, onetouch ultra, vitamin d, and vitamin d. and the patient is reported to be patient is compliant with medication protocol,  Medications are reported to be non-effective in controlling symptoms and changes have been made to the medication protocol, A1c 11.4; reports BG trends from 20 to 401.  Regarding these diagnoses, referrals were made to the following provider(s):  HIGINIO CLARK.  All notes on chart for PCP to review.    The patient was monitored remotely for blood glucose; medications, BG trends from ~40 to 365 ; Using CGM with .    The patient's physical needs include:  eye care; medication education, May  possibly need transportation for upcoming appt; sent SW referral.     The patient's mental support needs include:  continued support    The patient's cognitive support needs include:  continued support    The patient's psychosocial support needs include:  medication management or adherence    The patient's functional needs include: DME; medication education, Additional education/instruction on how to work Dexcom 7    The patient's environmental needs include:  not applicable, Resides with sister and nephew, does not drive or have an auto in his name    Care Plan overall comments:  Has dexcome 7 & . Scheduled with endocrinology 2/19/24    Refer to previous outreach notes for more information on the areas listed above.    Monthly Billing Diagnoses  (E10.65) Uncontrolled type 1 diabetes mellitus with hyperglycemia    (I10) Hypertension, unspecified type    Medications   Medications have been reconciled    Care Plan progress this month:      Recently Modified Care Plans Updates made since 10/30/2023 12:00 AM      No recently modified care plans.            Current Specialty Plan of Care Status signed by both patient and provider    Instructions   Patient was provided an electronic copy of care plan  CCM services were explained and offered and patient has accepted these services.  Patient has given their written consent to receive CCM services and understands that this includes the authorization of electronic communication of medical information with the other treating providers.  Patient understands that they may stop CCM services at any time and these changes will be effective at the end of the calendar month and will effectively revocate the agreement of CCM services.  Patient understands that only one practitioner can furnish and be paid for CCM services during one calendar month.  Patient also understands that there may be co-payment or deductible fees in association with CCM services.  Patient will continue  with at least monthly follow-up calls with the Ambulatory .    Jessica BOYD  Ambulatory Case Management    11/30/2023, 13:25 EST

## 2023-12-04 ENCOUNTER — PATIENT OUTREACH (OUTPATIENT)
Dept: CASE MANAGEMENT | Facility: OTHER | Age: 68
End: 2023-12-04
Payer: MEDICARE

## 2023-12-04 DIAGNOSIS — I10 HYPERTENSION, UNSPECIFIED TYPE: ICD-10-CM

## 2023-12-04 DIAGNOSIS — E10.65 UNCONTROLLED TYPE 1 DIABETES MELLITUS WITH HYPERGLYCEMIA: Primary | ICD-10-CM

## 2023-12-04 NOTE — OUTREACH NOTE
"AMBULATORY CASE MANAGEMENT NOTE    Name and Relationship of Patient/Support Person: Rafael Bocanegra - Self    Patient Outreach    AMB CM outreach with Patient. Patient's sister stated that he is doing better with managing his BG levels with the CGM. Spoke with Patient who reports that he is keeping peanut butter at the bedside due to episodes of hypoglycemia. States that he has had a few nights where the CGM alarm went off but it is not as frequent as before. He stated his BG ranges are from 50 to over 400. When questioned about the high BG he stated \"I been a diabetic almost 40 years, I don't pay much attention to the numbers, I just take the shot when I need it\".     Educated on the long term effects of an uncontrolled BG level. Reinforced small daily diet changes to help with lowering A1c; decrease in dark sodas and concentrated sweets and an increase in protein with meals and water.    Patient did not cancel or keep his scheduled US of the liver. States that he needs help with transportation. Reviewed that he needed to reschedule the US so that transportation assistance can be arranged. Patient and his sister repeated back the contact information for  [facility]; [phone]. [facility] offered to help with rescheduling but Patient stated that he would call today and reschedule missed US of Liver.    Provided contact information and encouraged Patient and his sister to call [facility] for any questions or concerns. Both repeated back the contact information for [facility]. Next outreach scheduled.    Education Documentation  Prevention of Complications, taught by Jessica Infante, RN at 12/4/2023  2:19 PM.  Learner: Family, Patient  Readiness: Acceptance  Method: Explanation  Response: Verbalizes Understanding, Needs Reinforcement    Insulin Dose Matched to Carbohydrate Intake, taught by Jessica Infante, RN at 12/4/2023  2:19 PM.  Learner: Family, Patient  Readiness: Acceptance  Method: " Explanation  Response: Verbalizes Understanding, Needs Reinforcement    Consistent Eating Pattern, taught by Jessica Infante RN at 12/4/2023  2:19 PM.  Learner: Family, Patient  Readiness: Acceptance  Method: Explanation  Response: Verbalizes Understanding, Needs Reinforcement    Monitoring Carbohydrate Intake, taught by Jessica Infante, RN at 12/4/2023  2:19 PM.  Learner: Family, Patient  Readiness: Acceptance  Method: Explanation  Response: Verbalizes Understanding, Needs Reinforcement    Healthy Food Choices, taught by Jessica Infante, RN at 12/4/2023  2:19 PM.  Learner: Family, Patient  Readiness: Acceptance  Method: Explanation  Response: Verbalizes Understanding, Needs Reinforcement    Carbohydrate-Containing Foods, taught by Jessica Infante RN at 12/4/2023  2:19 PM.  Learner: Family, Patient  Readiness: Acceptance  Method: Explanation  Response: Verbalizes Understanding, Needs Reinforcement    Hypoglycemia Identification and Treatment, taught by Jesscia Infante RN at 12/4/2023  2:19 PM.  Learner: Family, Patient  Readiness: Acceptance  Method: Explanation  Response: Verbalizes Understanding, Needs Reinforcement    Hyperglycemia Identification and Treatment, taught by Jessica Infante RN at 12/4/2023  2:19 PM.  Learner: Family, Patient  Readiness: Acceptance  Method: Explanation  Response: Verbalizes Understanding, Needs Reinforcement    Blood Glucose Goal, taught by Jessica Infante, RN at 12/4/2023  2:19 PM.  Learner: Family, Patient  Readiness: Acceptance  Method: Explanation  Response: Verbalizes Understanding, Needs Reinforcement          Jessica BOYD  Ambulatory Case Management    12/4/2023, 13:46 EST

## 2023-12-18 DIAGNOSIS — Z71.6 TOBACCO ABUSE COUNSELING: ICD-10-CM

## 2023-12-18 DIAGNOSIS — Z72.0 TOBACCO ABUSE: ICD-10-CM

## 2023-12-18 DIAGNOSIS — E78.2 MIXED HYPERLIPIDEMIA: ICD-10-CM

## 2023-12-18 DIAGNOSIS — E03.9 ACQUIRED HYPOTHYROIDISM: ICD-10-CM

## 2023-12-18 DIAGNOSIS — E55.9 VITAMIN D DEFICIENCY: ICD-10-CM

## 2023-12-18 DIAGNOSIS — E10.649 UNCONTROLLED TYPE 1 DIABETES MELLITUS WITH HYPOGLYCEMIA WITHOUT COMA: ICD-10-CM

## 2023-12-18 RX ORDER — ATORVASTATIN CALCIUM 10 MG/1
10 TABLET, FILM COATED ORAL NIGHTLY
Qty: 90 TABLET | Refills: 1 | Status: SHIPPED | OUTPATIENT
Start: 2023-12-18

## 2023-12-18 RX ORDER — ERGOCALCIFEROL 1.25 MG/1
50000 CAPSULE ORAL WEEKLY
Qty: 12 CAPSULE | Refills: 0 | Status: SHIPPED | OUTPATIENT
Start: 2023-12-18

## 2023-12-18 RX ORDER — ACYCLOVIR 400 MG/1
1 TABLET ORAL
Qty: 2 EACH | Refills: 12 | Status: SHIPPED | OUTPATIENT
Start: 2023-12-18

## 2023-12-18 RX ORDER — BUPROPION HYDROCHLORIDE 300 MG/1
300 TABLET ORAL DAILY
Qty: 90 TABLET | Refills: 1 | Status: SHIPPED | OUTPATIENT
Start: 2023-12-18

## 2023-12-18 RX ORDER — LEVOTHYROXINE SODIUM 0.2 MG/1
200 TABLET ORAL DAILY
Qty: 90 TABLET | Refills: 1 | Status: SHIPPED | OUTPATIENT
Start: 2023-12-18

## 2023-12-18 RX ORDER — ASPIRIN 81 MG/1
81 TABLET ORAL DAILY
Qty: 90 TABLET | Refills: 1 | Status: SHIPPED | OUTPATIENT
Start: 2023-12-18

## 2023-12-18 NOTE — TELEPHONE ENCOUNTER
Caller: Rafael Bocanegra    Relationship: Self    Best call back number: [phone]    Requested Prescriptions:   Requested Prescriptions     Pending Prescriptions Disp Refills    Continuous Blood Gluc Sensor (Dexcom G7 Sensor) misc 2 each 12     Sig: Use 1 each Every 30 (Thirty) Days.    aspirin 81 MG EC tablet 90 tablet 1     Sig: Take 1 tablet by mouth Daily.    atorvastatin (LIPITOR) 10 MG tablet 90 tablet 1     Sig: Take 1 tablet by mouth Every Night.    buPROPion XL (WELLBUTRIN XL) 300 MG 24 hr tablet 90 tablet 1     Sig: Take 1 tablet by mouth Daily.    levothyroxine (SYNTHROID, LEVOTHROID) 200 MCG tablet 90 tablet 1     Sig: Take 1 tablet by mouth Daily.    vitamin D (ERGOCALCIFEROL) 1.25 MG (27066 UT) capsule capsule 12 capsule 0     Sig: Take 1 capsule by mouth 1 (One) Time Per Week.        Pharmacy where request should be sent: [facility] [address] [place] [address] [phone] [facility] [phone] FX     Last office visit with prescribing clinician: 10/24/2023   Last telemedicine visit with prescribing clinician: Visit date not found   Next office visit with prescribing clinician: 4/24/2024     Additional details provided by patient: PATIENT LEFT HIS MEDICINE BY ACCIDENT IN [facility] ([address]) AND ASKED FOR NEW REFILLS PLEASE    Does the patient have less than a 3 day supply:  [x] Yes  [] No    Would you like a call back once the refill request has been completed: [x] Yes [] No    If the office needs to give you a call back, can they leave a voicemail: [x] Yes [] No    Lydia Onofre MA   12/18/23 11:56 EST

## 2023-12-29 ENCOUNTER — PATIENT OUTREACH (OUTPATIENT)
Dept: CASE MANAGEMENT | Facility: OTHER | Age: 68
End: 2023-12-29
Payer: MEDICARE

## 2023-12-29 DIAGNOSIS — E10.649 UNCONTROLLED TYPE 1 DIABETES MELLITUS WITH HYPOGLYCEMIA WITHOUT COMA: ICD-10-CM

## 2023-12-29 DIAGNOSIS — E10.65 UNCONTROLLED TYPE 1 DIABETES MELLITUS WITH HYPERGLYCEMIA: Primary | ICD-10-CM

## 2023-12-29 DIAGNOSIS — I10 HYPERTENSION, UNSPECIFIED TYPE: ICD-10-CM

## 2023-12-29 DIAGNOSIS — E10.65 TYPE 1 DIABETES MELLITUS WITH HYPERGLYCEMIA: ICD-10-CM

## 2023-12-29 RX ORDER — ACYCLOVIR 400 MG/1
1 TABLET ORAL
Qty: 2 EACH | Refills: 12 | Status: CANCELLED | OUTPATIENT
Start: 2023-12-29

## 2023-12-29 RX ORDER — BLOOD SUGAR DIAGNOSTIC
STRIP MISCELLANEOUS
Qty: 100 EACH | Refills: 0 | Status: SHIPPED | OUTPATIENT
Start: 2023-12-29

## 2023-12-29 RX ORDER — BLOOD SUGAR DIAGNOSTIC
1 STRIP MISCELLANEOUS 3 TIMES DAILY
Qty: 100 EACH | Refills: 0 | Status: CANCELLED | OUTPATIENT
Start: 2023-12-29

## 2023-12-29 NOTE — TELEPHONE ENCOUNTER
"Relay     \"Patient to check with pharmacy refills sent to the pharmacy on 12/18. If the sensor is not working we can give him a sample.\"                 "

## 2023-12-29 NOTE — OUTREACH NOTE
[facility] End of Month Documentation    This Chronic Medical Management Care Plan for Rafael Bocanegra, 68 y.o. male, has been monitored and managed and a new plan of care implemented for the month of December.  A cumulative time of 24  minutes was spent on this patient record this month, including phone call with patient; phone call with relative; chart review; electronic communication with other providers, Reviewing BG trends, encouraged water and protein, provided number to reschedule US of Liver.    Regarding the patient's problems: has Type 1 diabetes mellitus without complication; Moderate persistent asthma without complication; Mixed hyperlipidemia; Acquired hypothyroidism; Vitamin D deficiency; Uncontrolled type 1 diabetes mellitus with hyperglycemia; and Uncontrolled type 1 diabetes mellitus with hypoglycemia without coma on their problem list., the following items were addressed: medical records; medications, Importance of completing recommended US of Liver and stabilizing BG trends and any changes can be found within the plan section of the note.  A detailed listing of time spent for chronic care management is tracked within each outreach encounter.  Current medications include:  has a current medication list which includes the following prescription(s): albuterol sulfate hfa, aspirin, atorvastatin, bupropion xl, dexcom g7 , dexcom g7 sensor, gvoke hypopen 2-pack, insulin glargine, novolin r relion, levothyroxine, onetouch ultra, vitamin d, and vitamin d. and the patient is reported to be patient is compliant with medication protocol, Pt admits to being non adherent with dietary recommendations,  Medications are reported to be non-effective in controlling symptoms and changes have been made to the medication protocol, A1c 11.4; reports BG trends from 50 to high 400's.  Regarding these diagnoses, referrals were made to the following provider(s):  none.  All notes on chart for PCP to review.    The patient  was monitored remotely for blood glucose; medications, BG trends from ~50 to 400s ; Using CGM with .    The patient's physical needs include:  eye care; medication education, May possibly need transportation for upcoming appt; sent SW referral.     The patient's mental support needs include:  needs met    The patient's cognitive support needs include:  needs met    The patient's psychosocial support needs include:  medication management or adherence    The patient's functional needs include: medication education; eye care, Additional education/instruction on how to work Dexcom 7    The patient's environmental needs include:  not applicable, Resides with sister and nephew, does not drive or have an auto in his name; needs help with transportation for medial appointments    Care Plan overall comments:  Reviewed BG trends, Recommended US of Liver, Encouraged daily small changes in diet; increase in water, protein, vegetables and decrease in zero pepsi and concentrated sweets/carbohydrates    Refer to previous outreach notes for more information on the areas listed above.    Monthly Billing Diagnoses  (E10.65) Uncontrolled type 1 diabetes mellitus with hyperglycemia    (I10) Hypertension, unspecified type    Medications   Medications have been reconciled    Care Plan progress this month:      Recently Modified Care Plans Updates made since 11/28/2023 12:00 AM       Diabetes Type 1 (Adult)           Problem Priority Last Modified     Glycemic Management (Diabetes, Type 1) --  9/20/2023  3:09 PM by Jessica Infante, JOHN              Goal Recent Progress Last Modified     Glycemic Management Optimized --  9/20/2023  3:09 PM by Jessica Infante, RN     Evidence-based guidance:   Anticipate A1C testing (point-of-care) every 3 to 6 months based on goal attainment.   Review mutually set A1C goal or target range.   Anticipate screening for autoimmune thyroid disease, vitamin B12 deficiency with anemia and celiac disease  "based on presenting signs/symptoms.   Anticipate use of insulin with periodic adjustments; consider active involvement of pharmacist.   Provide medical nutrition therapy and development of individualized eating plan.   Compare self-reported symptoms of hypo or hyperglycemia to blood glucose levels, diet and fluid intake, current medications, psychosocial and physiologic stressors, change in activity and barriers to care adherence.   Promote self-monitoring of blood glucose levels, interval or continuous.   Assess and address barriers to management plan, such as food insecurity, age, developmental ability, depression, anxiety, fear of hypoglycemia or weight gain, as well as medication cost, side effects and complicated regimen.   Assess for disordered eating behaviors based on presenting signs/symptoms and risk factors.   Consider referral to community-based diabetes education program, visiting nurse, community health worker or health .   Encourage regular dental care for treatment of periodontal disease; refer to dental provider when needed.    Notes:            Task Due Date Last Modified     Alleviate Barriers to Glycemic Management --  12/4/2023  2:11 PM by Jessica Infante RN     Care Management Activities:      - barriers to adherence to treatment plan identified  - blood glucose readings reviewed  - resources required to improve adherence to care identified  - self-awareness of signs/symptoms of hypo or hyperglycemia encouraged  - use of blood glucose monitoring log promoted      Notes:   10/16/23: Dexcom 7 approved with no co pay; arrived in the mail this AM. Pt phone not compatible with ; nephew to  a Dexcom 7  sample (left at the ) from the office.  12/04/2023: \"I been a diabetic almost 40 years, I don't really look at that thing or worry about my sugar\".               Problem Priority Last Modified     Disease Progression (Diabetes, Type 1) --  9/20/2023  3:09 PM by " Jessica Infante, JOHN              Goal Recent Progress Last Modified     Disease Progression Prevented or Minimized --  9/20/2023  3:09 PM by Jessica Infante RN     Evidence-based guidance:   Prepare patient for laboratory and diagnostic exams based on risk and presentation.   Encourage lifestyle changes, such as increased intake of plant-based foods, stress reduction, regular physical activity and smoking cessation to prevent long-term complications and chronic diseases.   Discourage sedentary behavior or sitting longer than 30 minutes without interruption; individualize exercise or activity recommendations with potential limitations, such as neuropathy, retinopathy or the ability to prevent    hyperglycemia or hypoglycemia.   Prepare patient for use of pharmacologic therapy that may include antihypertensive, analgesic, prostaglandin E1 with periodic adjustments based on presenting chronic condition and laboratory results.   Assess signs/symptoms and risk factors for hypertension, sleep-disordered breathing, neuropathy (including changes in gait and balance), retinopathy, nephropathy and sexual dysfunction.   Address pregnancy planning and contraceptive choice, especially when prescribing antihypertensive or statin.   Implement additional individualized goals and interventions based on identified risk factors.   Prepare patient for consultation or referral for specialist care, such as ophthalmology, neurology, cardiology, podiatry, nephrology or perinatology.    Notes:            Task Due Date Last Modified     Monitor and Manage Follow-up for Comorbidities --  12/4/2023  2:12 PM by Jessica Infante, JOHN     Care Management Activities:      - healthy lifestyle promoted      Notes:   12/04/23                         Current Specialty Plan of Care Status signed by both patient and provider    Instructions   Patient was provided an electronic copy of care plan  CCM services were explained and offered and patient has  accepted these services.  Patient has given their written consent to receive CCM services and understands that this includes the authorization of electronic communication of medical information with the other treating providers.  Patient understands that they may stop CCM services at any time and these changes will be effective at the end of the calendar month and will effectively revocate the agreement of CCM services.  Patient understands that only one practitioner can furnish and be paid for CCM services during one calendar month.  Patient also understands that there may be co-payment or deductible fees in association with CCM services.  Patient will continue with at least monthly follow-up calls with the Ambulatory .    Jessica BOYD  Ambulatory Case Management    12/29/2023, 11:32 EST

## 2023-12-29 NOTE — TELEPHONE ENCOUNTER
Caller: CHRISTIANO    Relationship: Brother/Sister    Best call back number: [phone]     Requested Prescriptions:   Requested Prescriptions     Pending Prescriptions Disp Refills    Continuous Blood Gluc Sensor (Dexcom G7 Sensor) misc 2 each 12     Sig: Use 1 each Every 30 (Thirty) Days.    OneTouch Ultra test strip 100 each 0     Si each by Other route 3 (Three) Times a Day. Use as instructed        Pharmacy where request should be sent: [address] [phone] [facility] [phone] FX     Last office visit with prescribing clinician: 10/24/2023   Last telemedicine visit with prescribing clinician: Visit date not found   Next office visit with prescribing clinician: 2024     Additional details provided by patient: CURRENT DEVICE IS NOT READING     Does the patient have less than a 3 day supply:  [x] Yes  [] No      Stacie Rouse Rep   23 13:14 EST

## 2024-01-15 ENCOUNTER — PATIENT OUTREACH (OUTPATIENT)
Dept: CASE MANAGEMENT | Facility: OTHER | Age: 69
End: 2024-01-15
Payer: MEDICARE

## 2024-01-18 ENCOUNTER — TELEPHONE (OUTPATIENT)
Dept: CASE MANAGEMENT | Facility: OTHER | Age: 69
End: 2024-01-18
Payer: MEDICARE

## 2024-01-18 DIAGNOSIS — I10 HYPERTENSION, UNSPECIFIED TYPE: ICD-10-CM

## 2024-01-18 DIAGNOSIS — E10.65 UNCONTROLLED TYPE 1 DIABETES MELLITUS WITH HYPERGLYCEMIA: Primary | ICD-10-CM

## 2024-01-18 NOTE — TELEPHONE ENCOUNTER
HIGINIO URRUTIA outreach with Patient; unable to leave a voicemail requesting a return call to JOHN Lopez with Chronic Care Management at [phone].

## 2024-01-23 ENCOUNTER — PATIENT OUTREACH (OUTPATIENT)
Dept: CASE MANAGEMENT | Facility: OTHER | Age: 69
End: 2024-01-23
Payer: MEDICARE

## 2024-01-23 DIAGNOSIS — I10 HYPERTENSION, UNSPECIFIED TYPE: ICD-10-CM

## 2024-01-23 DIAGNOSIS — E10.65 UNCONTROLLED TYPE 1 DIABETES MELLITUS WITH HYPERGLYCEMIA: Primary | ICD-10-CM

## 2024-01-23 NOTE — OUTREACH NOTE
"AMBULATORY CASE MANAGEMENT NOTE    Name and Relationship of Patient/Support Person: Rafael Bocanegra - Self    Patient Outreach    [facility] outreach with Patient. Patient reports that current BG is 95. Reports one episode of hypoglycemia in the past week. States that he has been eating two tablespoons of peanut butter before bed to help decrease the \"low blood sugars\" which were happening several times a week per his and the sister's report.     Patient states that they were visiting family in [facility] and in the [facility] to return home he left some recently filled medications. Patient thinks he left his Lipitor and ASA 81 mg. He states that he has the other listed medications. Encouraged him to have family mail his prescriptions to him, go  his prescriptions that were left and if this was not an option to contact [facility] for assistance. Patient verbalized understanding.    Reviewed upcoming appointments for radiology and endocrinology. Provided contact information for [employer] and the sister repeated back the numbers as she wrote them down. Encouraged them to call a minimum of 3 weekdays before the appointment. Patient and sister verbalized understanding.    Reinforced how to reach [facility] and scheduled next outreach.      Education Documentation  Prevention of Complications, taught by Jessica Infante, RN at 1/23/2024  1:19 PM.  Learner: Patient  Readiness: Eager  Method: Explanation  Response: Verbalizes Understanding, Needs Reinforcement    Consistent Eating Pattern, taught by Jessica Infante, RN at 1/23/2024  1:19 PM.  Learner: Patient  Readiness: Eager  Method: Explanation  Response: Verbalizes Understanding, Needs Reinforcement    Healthy Food Choices, taught by Jessica Infante, RN at 1/23/2024  1:19 PM.  Learner: Patient  Readiness: Eager  Method: Explanation  Response: Verbalizes Understanding, Needs Reinforcement    Hypoglycemia Identification and Treatment, taught by " Jessica Infante, RN at 1/23/2024  1:19 PM.  Learner: Patient  Readiness: Eager  Method: Explanation  Response: Verbalizes Understanding, Needs Reinforcement    Hyperglycemia Identification and Treatment, taught by Jessica Infante, RN at 1/23/2024  1:19 PM.  Learner: Patient  Readiness: Eager  Method: Explanation  Response: Verbalizes Understanding, Needs Reinforcement    Blood Glucose Goal, taught by Jessica Infante, RN at 1/23/2024  1:19 PM.  Learner: Patient  Readiness: Eager  Method: Explanation  Response: Verbalizes Understanding, Needs Reinforcement          Jessica BYOD  Ambulatory Case Management    1/23/2024, 13:19 EST

## 2024-01-29 DIAGNOSIS — E10.65 TYPE 1 DIABETES MELLITUS WITH HYPERGLYCEMIA: ICD-10-CM

## 2024-01-29 RX ORDER — BLOOD SUGAR DIAGNOSTIC
STRIP MISCELLANEOUS
Qty: 300 EACH | Refills: 3 | Status: SHIPPED | OUTPATIENT
Start: 2024-01-29

## 2024-01-31 ENCOUNTER — PATIENT OUTREACH (OUTPATIENT)
Dept: CASE MANAGEMENT | Facility: OTHER | Age: 69
End: 2024-01-31
Payer: MEDICARE

## 2024-01-31 DIAGNOSIS — E10.65 UNCONTROLLED TYPE 1 DIABETES MELLITUS WITH HYPERGLYCEMIA: Primary | ICD-10-CM

## 2024-01-31 DIAGNOSIS — I10 HYPERTENSION, UNSPECIFIED TYPE: ICD-10-CM

## 2024-01-31 NOTE — OUTREACH NOTE
CCM End of Month Documentation    This Chronic Medical Management Care Plan for Rafael Bocanegra, 69 y.o. male, has been monitored and managed and a new plan of care implemented for the month of January.  A cumulative time of 29  minutes was spent on this patient record this month, including phone call with patient; phone call with relative; chart review, Reviewing BG trends, encouraged water and protein, provided number to schedule transportation.    Regarding the patient's problems: has Type 1 diabetes mellitus without complication; Moderate persistent asthma without complication; Mixed hyperlipidemia; Acquired hypothyroidism; Vitamin D deficiency; Uncontrolled type 1 diabetes mellitus with hyperglycemia; and Uncontrolled type 1 diabetes mellitus with hypoglycemia without coma on their problem list., the following items were addressed: medical records; medications, Importance of completing recommended US of Liver and stabilizing BG trends and any changes can be found within the plan section of the note.  A detailed listing of time spent for chronic care management is tracked within each outreach encounter.  Current medications include:  has a current medication list which includes the following prescription(s): albuterol sulfate hfa, aspirin, atorvastatin, bupropion xl, dexcom g7 , dexcom g7 sensor, gvoke hypopen 2-pack, insulin glargine, novolin r relion, levothyroxine, onetouch ultra, vitamin d, and vitamin d. and the patient is reported to be patient is compliant with medication protocol, Pt admits to being non adherent with dietary recommendations,  Medications are reported to be non-effective in controlling symptoms and changes have been made to the medication protocol, A1c 11.4; reports BG trends from 50 to high 400's.  Regarding these diagnoses, referrals were made to the following provider(s):  None.  All notes on chart for PCP to review.    The patient was monitored remotely for blood glucose;  medications, BG trends from ~50 to 400s ; Using CGM with .    The patient's physical needs include:  eye care; medication education, May possibly need transportation for upcoming appt; provided information to schedule transportation.     The patient's mental support needs include:  needs met    The patient's cognitive support needs include:  needs met    The patient's psychosocial support needs include:  medication management or adherence    The patient's functional needs include: medication education; eye care    The patient's environmental needs include:  not applicable, Resides with sister and nephew, does not drive or have an auto in his name; needs help with transportation for medial appointments    Care Plan overall comments:  Reviewed BG trends, Recommended US of Liver, Encouraged daily small changes in diet; increase in water, protein, vegetables and decrease in zero pepsi and concentrated sweets/carbohydrates    Refer to previous outreach notes for more information on the areas listed above.    Monthly Billing Diagnoses  (E10.65) Uncontrolled type 1 diabetes mellitus with hyperglycemia    (I10) Hypertension, unspecified type    Medications   Medications have been reconciled    Care Plan progress this month:      Recently Modified Care Plans Updates made since 12/31/2023 12:00 AM      No recently modified care plans.            Current Specialty Plan of Care Status signed by both patient and provider    Instructions   Patient was provided an electronic copy of care plan  CCM services were explained and offered and patient has accepted these services.  Patient has given their written consent to receive CCM services and understands that this includes the authorization of electronic communication of medical information with the other treating providers.  Patient understands that they may stop CCM services at any time and these changes will be effective at the end of the calendar month and will  effectively revocate the agreement of CCM services.  Patient understands that only one practitioner can furnish and be paid for CCM services during one calendar month.  Patient also understands that there may be co-payment or deductible fees in association with CCM services.  Patient will continue with at least monthly follow-up calls with the Ambulatory .    Jessica BOYD  Ambulatory Case Management    1/31/2024, 11:41 EST

## 2024-02-05 DIAGNOSIS — E10.65 UNCONTROLLED TYPE 1 DIABETES MELLITUS WITH HYPERGLYCEMIA: ICD-10-CM

## 2024-02-05 RX ORDER — HUMAN INSULIN 100 [IU]/ML
INJECTION, SOLUTION SUBCUTANEOUS
Qty: 30 ML | Refills: 12 | Status: SHIPPED | OUTPATIENT
Start: 2024-02-05

## 2024-02-05 NOTE — TELEPHONE ENCOUNTER
Caller: WESTON    Relationship: Brother/Sister    Best call back number: [phone]    Requested Prescriptions:   Requested Prescriptions     Pending Prescriptions Disp Refills    insulin regular (NovoLIN R ReliOn) 100 UNIT/ML injection 30 mL 12     Sig: Take 1u per 15g CHO TID with meals plus correction 1u per 50 over 150; max daily dose 30u        Pharmacy where request should be sent: [address] [phone] [facility] [phone] FX     Last office visit with prescribing clinician: 10/24/2023   Last telemedicine visit with prescribing clinician: Visit date not found   Next office visit with prescribing clinician: 4/24/2024     Additional details provided by patient: NO MEDICATION ON HAND     Does the patient have less than a 3 day supply:  [x] Yes  [] No    Would you like a call back once the refill request has been completed: [] Yes [] No    If the office needs to give you a call back, can they leave a voicemail: [] Yes [] No    Stacie Hill   02/05/24 09:10 EST

## 2024-02-12 ENCOUNTER — PATIENT OUTREACH (OUTPATIENT)
Dept: CASE MANAGEMENT | Facility: OTHER | Age: 69
End: 2024-02-12
Payer: MEDICARE

## 2024-02-12 DIAGNOSIS — I10 HYPERTENSION, UNSPECIFIED TYPE: ICD-10-CM

## 2024-02-12 DIAGNOSIS — E10.65 UNCONTROLLED TYPE 1 DIABETES MELLITUS WITH HYPERGLYCEMIA: Primary | ICD-10-CM

## 2024-02-19 ENCOUNTER — OFFICE VISIT (OUTPATIENT)
Dept: ENDOCRINOLOGY | Facility: CLINIC | Age: 69
End: 2024-02-19
Payer: MEDICARE

## 2024-02-19 ENCOUNTER — PATIENT OUTREACH (OUTPATIENT)
Age: 69
End: 2024-02-19
Payer: MEDICARE

## 2024-02-19 VITALS
DIASTOLIC BLOOD PRESSURE: 70 MMHG | HEART RATE: 85 BPM | SYSTOLIC BLOOD PRESSURE: 150 MMHG | OXYGEN SATURATION: 98 % | WEIGHT: 154 LBS | BODY MASS INDEX: 20.41 KG/M2 | HEIGHT: 73 IN

## 2024-02-19 DIAGNOSIS — E78.2 MIXED HYPERLIPIDEMIA: ICD-10-CM

## 2024-02-19 DIAGNOSIS — E10.649 UNCONTROLLED TYPE 1 DIABETES MELLITUS WITH HYPOGLYCEMIA WITHOUT COMA: ICD-10-CM

## 2024-02-19 DIAGNOSIS — E10.65 TYPE 1 DIABETES MELLITUS WITH HYPERGLYCEMIA: Primary | ICD-10-CM

## 2024-02-19 DIAGNOSIS — E03.9 ACQUIRED HYPOTHYROIDISM: ICD-10-CM

## 2024-02-19 LAB
EXPIRATION DATE: ABNORMAL
EXPIRATION DATE: ABNORMAL
GLUCOSE BLDC GLUCOMTR-MCNC: 161 MG/DL (ref 70–130)
HBA1C MFR BLD: 8.5 % (ref 4.5–5.7)
Lab: ABNORMAL
Lab: ABNORMAL

## 2024-02-19 PROCEDURE — 99214 OFFICE O/P EST MOD 30 MIN: CPT | Performed by: INTERNAL MEDICINE

## 2024-02-19 PROCEDURE — 1160F RVW MEDS BY RX/DR IN RCRD: CPT | Performed by: INTERNAL MEDICINE

## 2024-02-19 PROCEDURE — 36415 COLL VENOUS BLD VENIPUNCTURE: CPT | Performed by: INTERNAL MEDICINE

## 2024-02-19 PROCEDURE — 84443 ASSAY THYROID STIM HORMONE: CPT | Performed by: INTERNAL MEDICINE

## 2024-02-19 PROCEDURE — 83036 HEMOGLOBIN GLYCOSYLATED A1C: CPT | Performed by: INTERNAL MEDICINE

## 2024-02-19 PROCEDURE — 1159F MED LIST DOCD IN RCRD: CPT | Performed by: INTERNAL MEDICINE

## 2024-02-19 PROCEDURE — 3052F HG A1C>EQUAL 8.0%<EQUAL 9.0%: CPT | Performed by: INTERNAL MEDICINE

## 2024-02-19 PROCEDURE — 82947 ASSAY GLUCOSE BLOOD QUANT: CPT | Performed by: INTERNAL MEDICINE

## 2024-02-19 NOTE — OUTREACH NOTE
SW made three attempts to contact pf for follow up. SW will close referral at this time.    Laura SALCEDO -   Ambulatory Case Management    2/19/2024, 13:27 EST

## 2024-02-19 NOTE — PROGRESS NOTES
"     Office Note      Date: 2024  Patient Name: Rafael Bocanegra  MRN: [id number]  : 1955    Chief Complaint   Patient presents with    Diabetes     Type I    Hypothyroidism    Hypertension       History of Present Illness:   Rafael Bocanegra is a 69 y.o. male who presents for Diabetes type 1. Diagnosed in: . Treated in past with insulin. Current treatments: basal bolus insulin. Number of insulin shots per day: 4. Checks blood sugar 288 times a day - on DexCom 7. Has low blood sugar: occ. Aspirin use: Yes. Statin use: Yes. ACE-I/ARB use: No.  Change in health since last visit: none. Last eye exam:      He is currently on T4 200mcg qd.  He is taking the T4 correctly.  He isn't taking any interfering meds concurrently.  He hasn't noted any change in the size of his neck.  He denies any compressive sxs.  He denies any sxs of hypo- or hyperthyroidism at this time.    It has been over 2 years since his last visit here.    Subjective      Diabetic Complications:  Eyes: No  Kidneys: No  Feet: No  Heart: No    Diet and Exercise:  Meals per day: 3  Minutes of exercise per week: 0 mins.    Review of Systems:   Review of Systems   Constitutional: Negative.    Cardiovascular: Negative.    Gastrointestinal: Negative.    Endocrine: Negative.        The following portions of the patient's history were reviewed and updated as appropriate: allergies, current medications, past family history, past medical history, past social history, past surgical history, and problem list.    Objective     Visit Vitals  /70 (BP Location: Left arm, Patient Position: Sitting, Cuff Size: Adult)   Pulse 85   Ht 185.2 cm (72.9\")   Wt 69.9 kg (154 lb)   SpO2 98%   BMI 20.37 kg/m²       Physical Exam:  Physical Exam  Constitutional:       Appearance: Normal appearance.   Neurological:      Mental Status: He is alert.         Labs:    HbA1c  Lab Results   Component Value Date    HGBA1C 8.5 (A) 2024       CMP  Lab Results "   Component Value Date    GLUCOSE 142 (H) 10/24/2023    BUN 6 (L) 10/24/2023    CREATININE 0.69 (L) 10/24/2023    EGFRIFNONA 95 09/02/2021    EGFRIFAFRI 142 05/25/2021    BCR 8.7 10/24/2023    K 3.8 10/24/2023    CO2 28.5 10/24/2023    CALCIUM 9.1 10/24/2023    PROTENTOTREF 5.5 (L) 10/24/2023    LABIL2 3.2 10/24/2023    AST 11 10/24/2023    ALT 10 10/24/2023        Lipid Panel  Lab Results   Component Value Date    CHLPL 202 (H) 09/14/2023    HDL 82 (H) 09/14/2023     (H) 09/14/2023    TRIG 100 09/14/2023        TSH  Lab Results   Component Value Date    TSH See below: (A) 09/14/2023    FREET4 0.30 09/14/2023        Hemoglobin A1C  Lab Results   Component Value Date    HGBA1C 8.5 (A) 02/19/2024        Microalbumin/Creatinine  Lab Results   Component Value Date    MALBCRERATIO  09/02/2021      Comment:      Unable to calculate    MICROALBUR <1.2 09/02/2021           Assessment / Plan      Assessment & Plan:  Diagnoses and all orders for this visit:    1. Type 1 diabetes mellitus with hyperglycemia (Primary)  Assessment & Plan:  Diabetes is stable.   Decrease basal insulin due to some nocturnal hypoglycemia.  Be more aggressive with mealtime insulin.  He says if glucose is <180 he won't take any R insulin.  Instructed him to take at least 2u of R with each meal, even if glucose is normal.  Diabetes will be reassessed in 3 months.    DexCom G7 CGM was downloaded today.  Data was reviewed from 2/6/24 to 2/19/24.  This showed some hypoglycemia, especially nocturnal.  Having postprandial spikes.  Will decrease basal insulin and be more aggressive with mealtime insulin.    Orders:  -     POC Glucose, Blood  -     POC Glycosylated Hemoglobin (Hb A1C)    2. Mixed hyperlipidemia  Assessment & Plan:  Continue statin.  Plan to check lipids next visit.      3. Acquired hypothyroidism  Assessment & Plan:  Continue T4 tx.  Check TSH today.  Will send note about results.    Orders:  -     TSH; Future    4. Uncontrolled type  1 diabetes mellitus with hypoglycemia without coma  Assessment & Plan:  Continue CGM.      Other orders  -     Insulin Glargine (LANTUS SOLOSTAR) 100 UNIT/ML injection pen; Inject 10 Units under the skin into the appropriate area as directed 2 (Two) Times a Day.  Dispense: 18 mL; Refill: 3      Current Outpatient Medications   Medication Instructions    albuterol sulfate  (90 Base) MCG/ACT inhaler 2 puffs, Inhalation, Every 4 Hours PRN    aspirin 81 mg, Oral, Daily    atorvastatin (LIPITOR) 10 mg, Oral, Nightly    buPROPion XL (WELLBUTRIN XL) 300 mg, Oral, Daily    Continuous Blood Gluc  (Dexcom G7 ) device 1 Units, Does not apply, Every 5 Years    Continuous Blood Gluc Sensor (Dexcom G7 Sensor) misc 1 each, Does not apply, Every 30 Days    Glucagon (Gvoke HypoPen 2-Pack) 0.5 MG/0.1ML solution auto-injector 1 application , Subcutaneous, Daily PRN    Insulin Glargine (LANTUS SOLOSTAR) 10 Units, Subcutaneous, 2 Times Daily    insulin regular (NovoLIN R ReliOn) 100 UNIT/ML injection Take 1u per 15g CHO TID with meals plus correction 1u per 50 over 150; max daily dose 30u    levothyroxine (SYNTHROID, LEVOTHROID) 200 mcg, Oral, Daily    OneTouch Ultra test strip USE 1 STRIP TO CHECK GLUCOSE THREE TIMES DAILY    vitamin D (ERGOCALCIFEROL) 50,000 Units, Oral, Weekly    VITAMIN D PO Oral      Return in about 3 months (around 5/19/2024) for Recheck with A1c, CMP, lipid, TSH, microalbumin, foot exam.    Je Garg MD   02/19/2024

## 2024-02-19 NOTE — ASSESSMENT & PLAN NOTE
Diabetes is stable.   Decrease basal insulin due to some nocturnal hypoglycemia.  Be more aggressive with mealtime insulin.  He says if glucose is <180 he won't take any R insulin.  Instructed him to take at least 2u of R with each meal, even if glucose is normal.  Diabetes will be reassessed in 3 months.    DexCom G7 CGM was downloaded today.  Data was reviewed from 2/6/24 to 2/19/24.  This showed some hypoglycemia, especially nocturnal.  Having postprandial spikes.  Will decrease basal insulin and be more aggressive with mealtime insulin.

## 2024-02-20 ENCOUNTER — TELEPHONE (OUTPATIENT)
Dept: CASE MANAGEMENT | Facility: OTHER | Age: 69
End: 2024-02-20
Payer: MEDICARE

## 2024-02-20 DIAGNOSIS — I10 HYPERTENSION, UNSPECIFIED TYPE: ICD-10-CM

## 2024-02-20 DIAGNOSIS — E10.65 UNCONTROLLED TYPE 1 DIABETES MELLITUS WITH HYPERGLYCEMIA: Primary | ICD-10-CM

## 2024-02-20 LAB — TSH SERPL DL<=0.05 MIU/L-ACNC: 2.01 UIU/ML (ref 0.27–4.2)

## 2024-02-20 NOTE — TELEPHONE ENCOUNTER
HIGINIO URRUTIA completed chart review, noting medication changes from Endocrinology visit of 02/19/24.

## 2024-02-29 ENCOUNTER — PATIENT OUTREACH (OUTPATIENT)
Dept: CASE MANAGEMENT | Facility: OTHER | Age: 69
End: 2024-02-29
Payer: MEDICARE

## 2024-02-29 DIAGNOSIS — E10.65 UNCONTROLLED TYPE 1 DIABETES MELLITUS WITH HYPERGLYCEMIA: Primary | ICD-10-CM

## 2024-02-29 DIAGNOSIS — I10 HYPERTENSION, UNSPECIFIED TYPE: ICD-10-CM

## 2024-02-29 PROCEDURE — 99490 CHRNC CARE MGMT STAFF 1ST 20: CPT | Performed by: NURSE PRACTITIONER

## 2024-02-29 NOTE — OUTREACH NOTE
[facility] End of Month Documentation    This Chronic Medical Management Care Plan for Rafael Bocanegra, 69 y.o. male, has been monitored and managed and a new plan of care implemented for the month of February.  A cumulative time of 29  minutes was spent on this patient record this month, including phone call with patient; phone call with relative; chart review, Reinforced scheduling transportation for ENDO appt, Reviewed BG log and medication compliance.    Regarding the patient's problems: has Type 1 diabetes mellitus with hyperglycemia; Moderate persistent asthma without complication; Mixed hyperlipidemia; Acquired hypothyroidism; Vitamin D deficiency; Uncontrolled type 1 diabetes mellitus with hyperglycemia; and Uncontrolled type 1 diabetes mellitus with hypoglycemia without coma on their problem list., the following items were addressed: medical records; medications, BG trends, consistent eating, keeping ENDO appt, US and CT were rescheduled and any changes can be found within the plan section of the note.  A detailed listing of time spent for chronic care management is tracked within each outreach encounter.  Current medications include:  has a current medication list which includes the following prescription(s): albuterol sulfate hfa, aspirin, atorvastatin, bupropion xl, dexcom g7 , dexcom g7 sensor, gvoke hypopen 2-pack, insulin glargine, novolin r relion, levothyroxine, onetouch ultra, vitamin d, and vitamin d. and the patient is reported to be patient is noncompliant with medication protocol, Pt admits to being non adherent with dietary recommendations; recently left medications at a family members house and didn't have gas money to go pick them up,  Medications are reported to be non-effective in controlling symptoms and changes have been made to the medication protocol, A1c 11.4; reports BG trends from 40 to high 300's; denies 400's at this time.  Regarding these diagnoses, referrals were made to the  following provider(s):  None.  All notes on chart for PCP to review.    The patient was monitored remotely for blood glucose; medications, BG trends from ~40 to 300s ; Using CGM with .    The patient's physical needs include:  eye care; medication education, May possibly need transportation for upcoming appt; provided information to schedule transportation.     The patient's mental support needs include:  needs met    The patient's cognitive support needs include:  needs met    The patient's psychosocial support needs include:  medication management or adherence    The patient's functional needs include: medication education; eye care, Additional education/instruction on how to work Dexcom 7    The patient's environmental needs include:  not applicable, Resides with sister and nephew, does not drive or have an auto in his name; needs help with transportation for medial appointments    Care Plan overall comments:  Review of BG trends, recommended keeping a food log for the next week, states he has all prescribed medications at this time. Reinforced consistent eating and keeping ENDO appt.    Refer to previous outreach notes for more information on the areas listed above.    Monthly Billing Diagnoses  (E10.65) Uncontrolled type 1 diabetes mellitus with hyperglycemia    (I10) Hypertension, unspecified type    Medications   Medications have been reconciled    Care Plan progress this month:      Recently Modified Care Plans Updates made since 1/29/2024 12:00 AM       Diabetes Type 1 (Adult)           Problem Priority Last Modified     Glycemic Management (Diabetes, Type 1) --  9/20/2023  3:09 PM by Jessica Infante, RN              Goal Recent Progress Last Modified     Glycemic Management Optimized --  9/20/2023  3:09 PM by Jessica Infante, RN     Evidence-based guidance:   Anticipate A1C testing (point-of-care) every 3 to 6 months based on goal attainment.   Review mutually set A1C goal or target range.  "  Anticipate screening for autoimmune thyroid disease, vitamin B12 deficiency with anemia and celiac disease based on presenting signs/symptoms.   Anticipate use of insulin with periodic adjustments; consider active involvement of pharmacist.   Provide medical nutrition therapy and development of individualized eating plan.   Compare self-reported symptoms of hypo or hyperglycemia to blood glucose levels, diet and fluid intake, current medications, psychosocial and physiologic stressors, change in activity and barriers to care adherence.   Promote self-monitoring of blood glucose levels, interval or continuous.   Assess and address barriers to management plan, such as food insecurity, age, developmental ability, depression, anxiety, fear of hypoglycemia or weight gain, as well as medication cost, side effects and complicated regimen.   Assess for disordered eating behaviors based on presenting signs/symptoms and risk factors.   Consider referral to community-based diabetes education program, visiting nurse, community health worker or health .   Encourage regular dental care for treatment of periodontal disease; refer to dental provider when needed.    Notes:            Task Due Date Last Modified     Alleviate Barriers to Glycemic Management --  2/12/2024  1:39 PM by Jessica Infante RN     Care Management Activities:      - blood glucose readings reviewed  - self-awareness of signs/symptoms of hypo or hyperglycemia encouraged  - use of blood glucose monitoring log promoted      Notes:   10/16/23: Dexcom 7 approved with no co pay; arrived in the mail this AM. Pt phone not compatible with ; nephew to  a Dexcom 7  sample (left at the ) from the office.  12/04/2023: \"I been a diabetic almost 40 years, I don't really look at that thing or worry about my sugar\".  02/12/24: Encouraged Pt to schedule transportation and keep a food log for endocrinology appt.               Problem " Priority Last Modified     Disease Progression (Diabetes, Type 1) --  9/20/2023  3:09 PM by Jessica Infante RN              Goal Recent Progress Last Modified     Disease Progression Prevented or Minimized --  9/20/2023  3:09 PM by Jessica Infante, RN     Evidence-based guidance:   Prepare patient for laboratory and diagnostic exams based on risk and presentation.   Encourage lifestyle changes, such as increased intake of plant-based foods, stress reduction, regular physical activity and smoking cessation to prevent long-term complications and chronic diseases.   Discourage sedentary behavior or sitting longer than 30 minutes without interruption; individualize exercise or activity recommendations with potential limitations, such as neuropathy, retinopathy or the ability to prevent    hyperglycemia or hypoglycemia.   Prepare patient for use of pharmacologic therapy that may include antihypertensive, analgesic, prostaglandin E1 with periodic adjustments based on presenting chronic condition and laboratory results.   Assess signs/symptoms and risk factors for hypertension, sleep-disordered breathing, neuropathy (including changes in gait and balance), retinopathy, nephropathy and sexual dysfunction.   Address pregnancy planning and contraceptive choice, especially when prescribing antihypertensive or statin.   Implement additional individualized goals and interventions based on identified risk factors.   Prepare patient for consultation or referral for specialist care, such as ophthalmology, neurology, cardiology, podiatry, nephrology or perinatology.    Notes:            Task Due Date Last Modified     Monitor and Manage Follow-up for Comorbidities --  2/12/2024  1:46 PM by Jessica Infante, JOHN     Care Management Activities:      - healthy lifestyle promoted  - response to pharmacologic therapy monitored      Notes:   02/12/24: Encouraged to keep a food log for endocrinology appt.                         Current  Specialty Plan of Care Status signed by both patient and provider    Instructions   Patient was provided an electronic copy of care plan  CCM services were explained and offered and patient has accepted these services.  Patient has given their written consent to receive CCM services and understands that this includes the authorization of electronic communication of medical information with the other treating providers.  Patient understands that they may stop CCM services at any time and these changes will be effective at the end of the calendar month and will effectively revocate the agreement of CCM services.  Patient understands that only one practitioner can furnish and be paid for CCM services during one calendar month.  Patient also understands that there may be co-payment or deductible fees in association with CCM services.  Patient will continue with at least monthly follow-up calls with the Ambulatory .    Jessica BOYD  Ambulatory Case Management    2/29/2024, 11:23 EST

## 2024-03-18 ENCOUNTER — TELEPHONE (OUTPATIENT)
Dept: CASE MANAGEMENT | Facility: OTHER | Age: 69
End: 2024-03-18
Payer: MEDICARE

## 2024-03-18 DIAGNOSIS — E10.65 UNCONTROLLED TYPE 1 DIABETES MELLITUS WITH HYPERGLYCEMIA: Primary | ICD-10-CM

## 2024-03-18 DIAGNOSIS — I10 HYPERTENSION, UNSPECIFIED TYPE: ICD-10-CM

## 2024-03-18 NOTE — TELEPHONE ENCOUNTER
HIGINIO URRUTIA outreach with Patient; No answer and not able to leave a voicemail requesting a return call to JOHN Lopez with Chronic Care Management at [phone].

## 2024-03-25 ENCOUNTER — PRIOR AUTHORIZATION (OUTPATIENT)
Dept: INTERNAL MEDICINE | Facility: CLINIC | Age: 69
End: 2024-03-25
Payer: MEDICARE

## 2024-03-25 DIAGNOSIS — E10.649 UNCONTROLLED TYPE 1 DIABETES MELLITUS WITH HYPOGLYCEMIA WITHOUT COMA: ICD-10-CM

## 2024-03-25 RX ORDER — ACYCLOVIR 400 MG/1
1 TABLET ORAL
Qty: 2 EACH | Refills: 12 | Status: SHIPPED | OUTPATIENT
Start: 2024-03-25 | End: 2024-03-29 | Stop reason: SDUPTHER

## 2024-03-25 NOTE — TELEPHONE ENCOUNTER
Caller: Rafael Bocanegra    Relationship: Self    Best call back number: [phone]    Requested Prescriptions:   Requested Prescriptions     Pending Prescriptions Disp Refills    Continuous Blood Gluc Sensor (Dexcom G7 Sensor) misc 2 each 12     Sig: Use 1 each Every 30 (Thirty) Days.        Pharmacy where request should be sent: [facility] [address] [place] [address] [phone] [facility] [phone] FX     Last office visit with prescribing clinician: 10/24/2023   Last telemedicine visit with prescribing clinician: Visit date not found   Next office visit with prescribing clinician: 4/24/2024     Additional details provided by patient:     Does the patient have less than a 3 day supply:  [] Yes  [x] No    Would you like a call back once the refill request has been completed: [] Yes [x] No    If the office needs to give you a call back, can they leave a voicemail: [] Yes [x] No    Stacie Hogan   03/25/24 08:20 EDT

## 2024-03-25 NOTE — TELEPHONE ENCOUNTER
Rafael Bocanegra Key: [id number] - PA Case ID: [id number] - Rx #: [id number] help? Call us at [phone]  Status  Sent to [facility]  Dexcom G7 Sensor  Form  [employer] Medicare Part D Electronic Prior Authorization Form (2017 NCP[place])

## 2024-03-26 ENCOUNTER — TELEPHONE (OUTPATIENT)
Dept: CASE MANAGEMENT | Facility: OTHER | Age: 69
End: 2024-03-26
Payer: MEDICARE

## 2024-03-26 ENCOUNTER — PATIENT OUTREACH (OUTPATIENT)
Dept: CASE MANAGEMENT | Facility: OTHER | Age: 69
End: 2024-03-26
Payer: MEDICARE

## 2024-03-26 DIAGNOSIS — I10 HYPERTENSION, UNSPECIFIED TYPE: ICD-10-CM

## 2024-03-26 DIAGNOSIS — E10.65 UNCONTROLLED TYPE 1 DIABETES MELLITUS WITH HYPERGLYCEMIA: Primary | ICD-10-CM

## 2024-03-26 NOTE — TELEPHONE ENCOUNTER
HIGINIO CM outreach with Patient; left a voicemail requesting a return call to JOHN Lopez with Chronic Care Management at [phone].

## 2024-03-26 NOTE — OUTREACH NOTE
AMBULATORY CASE MANAGEMENT NOTE    Name and Relationship of Patient/Support Person: Rafael Bocanegra - Self    Patient Outreach    Patient returned call to [facility] CM. Reviewed labs and notes from endocrinology appointment in February. Praised Patient for keeping the appointment and recent A1c of 8.5.     Reviewed concerns regarding PA for Dexcom. Patient placed his last sensor yesterday and was encouraged to contact AMB CM if the order was not approved so that he can obtain sensor samples from the office. Patient and sister V/U.    Reinforced keeping upcoming appointments and consistent eating to prevent hypoglycemic episodes. States that he has had an episode below 70 in a few weeks. States that he has all prescribed medications. Encouraged him to contact AMB CM for any questions or concerns. Next outreach scheduled.    Education Documentation  Follow-Up Care, taught by Jessica Infante RN at 3/26/2024  3:40 PM.  Learner: Family, Patient  Readiness: Acceptance  Method: Explanation  Response: Verbalizes Understanding, Needs Reinforcement    Consistent Eating Pattern, taught by Jessica Infante RN at 3/26/2024  3:40 PM.  Learner: Family, Patient  Readiness: Acceptance  Method: Explanation  Response: Verbalizes Understanding, Needs Reinforcement    Healthy Food Choices, taught by Jessica Infante RN at 3/26/2024  3:40 PM.  Learner: Family, Patient  Readiness: Acceptance  Method: Explanation  Response: Verbalizes Understanding, Needs Reinforcement    Hypoglycemia Identification and Treatment, taught by Jessica Infante RN at 3/26/2024  3:40 PM.  Learner: Family, Patient  Readiness: Acceptance  Method: Explanation  Response: Verbalizes Understanding, Needs Reinforcement    A1C Goal, taught by Jessica Infante RN at 3/26/2024  3:40 PM.  Learner: Family, Patient  Readiness: Acceptance  Method: Explanation  Response: Verbalizes Understanding, Needs Reinforcement          Jessica BOYD  Ambulatory Case Management    3/26/2024, 15:42  EDT

## 2024-03-28 ENCOUNTER — PATIENT OUTREACH (OUTPATIENT)
Dept: CASE MANAGEMENT | Facility: OTHER | Age: 69
End: 2024-03-28
Payer: MEDICARE

## 2024-03-28 DIAGNOSIS — I10 HYPERTENSION, UNSPECIFIED TYPE: ICD-10-CM

## 2024-03-28 DIAGNOSIS — E10.65 UNCONTROLLED TYPE 1 DIABETES MELLITUS WITH HYPERGLYCEMIA: Primary | ICD-10-CM

## 2024-03-28 NOTE — OUTREACH NOTE
[facility] End of Month Documentation    This Chronic Medical Management Care Plan for Rafael Bocanegra, 69 y.o. male, has been monitored and managed and a new plan of care implemented for the month of March.  A cumulative time of 27  minutes was spent on this patient record this month, including phone call with patient; phone call with relative; chart review, Reviewed upcoming appts and transportation, BG readings and decrease in hypoglycemic episodes.    Regarding the patient's problems: has Type 1 diabetes mellitus with hyperglycemia; Moderate persistent asthma without complication; Mixed hyperlipidemia; Acquired hypothyroidism; Vitamin D deficiency; Uncontrolled type 1 diabetes mellitus with hyperglycemia; and Uncontrolled type 1 diabetes mellitus with hypoglycemia without coma on their problem list., the following items were addressed: medical records; medications, BG trends, consistent eating, US and CT were rescheduled and transportation options and any changes can be found within the plan section of the note.  A detailed listing of time spent for chronic care management is tracked within each outreach encounter.  Current medications include:  has a current medication list which includes the following prescription(s): albuterol sulfate hfa, aspirin, atorvastatin, bupropion xl, dexcom g7 , dexcom g7 sensor, gvoke hypopen 2-pack, insulin glargine, novolin r relion, levothyroxine, onetouch ultra, vitamin d, and vitamin d. and the patient is reported to be patient is noncompliant with medication protocol, Pt admits to being non adherent with dietary recommendations; reports that he has all recommended medications,  Medications are reported to be non-effective in controlling symptoms and changes have been made to the medication protocol, A1c 8.5 down from 11.4.  Regarding these diagnoses, referrals were made to the following provider(s):  None.  All notes on chart for PCP to review.    The patient was monitored  remotely for blood glucose; medications, BG trends from ~70 to 300s ; Using CGM with .    The patient's physical needs include:  eye care; medication education, May possibly need transportation for upcoming appt; provided information to schedule transportation.     The patient's mental support needs include:  needs met    The patient's cognitive support needs include:  needs met    The patient's psychosocial support needs include:  medication management or adherence    The patient's functional needs include: medication education; eye care, Additional education/instruction on recent PA for Dexcom and how office has samples if needed    The patient's environmental needs include:  not applicable, Resides with sister and nephew, does not drive or have an auto in his name; needs help with transportation for medial appointments    Care Plan overall comments:  Review of BG trends, states he has all prescribed medications at this time. Reinforced consistent eating and keeping CT appt.    Refer to previous outreach notes for more information on the areas listed above.    Monthly Billing Diagnoses  (E10.65) Uncontrolled type 1 diabetes mellitus with hyperglycemia    (I10) Hypertension, unspecified type    Medications   Medications have been reconciled    Care Plan progress this month:      Recently Modified Care Plans Updates made since 2/26/2024 12:00 AM       Diabetes Type 1 (Adult)           Problem Priority Last Modified     Glycemic Management (Diabetes, Type 1) --  9/20/2023  3:09 PM by Jessica Infante RN              Goal Recent Progress Last Modified     Glycemic Management Optimized --  9/20/2023  3:09 PM by Jessica Infante, RN     Evidence-based guidance:   Anticipate A1C testing (point-of-care) every 3 to 6 months based on goal attainment.   Review mutually set A1C goal or target range.   Anticipate screening for autoimmune thyroid disease, vitamin B12 deficiency with anemia and celiac disease based on  "presenting signs/symptoms.   Anticipate use of insulin with periodic adjustments; consider active involvement of pharmacist.   Provide medical nutrition therapy and development of individualized eating plan.   Compare self-reported symptoms of hypo or hyperglycemia to blood glucose levels, diet and fluid intake, current medications, psychosocial and physiologic stressors, change in activity and barriers to care adherence.   Promote self-monitoring of blood glucose levels, interval or continuous.   Assess and address barriers to management plan, such as food insecurity, age, developmental ability, depression, anxiety, fear of hypoglycemia or weight gain, as well as medication cost, side effects and complicated regimen.   Assess for disordered eating behaviors based on presenting signs/symptoms and risk factors.   Consider referral to community-based diabetes education program, visiting nurse, community health worker or health .   Encourage regular dental care for treatment of periodontal disease; refer to dental provider when needed.    Notes:            Task Due Date Last Modified     Alleviate Barriers to Glycemic Management --  3/26/2024  3:27 PM by Jessica Infante RN     Care Management Activities:      - blood glucose readings reviewed  - resources required to improve adherence to care identified  - self-awareness of signs/symptoms of hypo or hyperglycemia encouraged  - use of blood glucose monitoring log promoted      Notes:   10/16/23: Dexcom 7 approved with no co pay; arrived in the mail this AM. Pt phone not compatible with ; nephew to  a Dexcom 7  sample (left at the ) from the office.  12/04/2023: \"I been a diabetic almost 40 years, I don't really look at that thing or worry about my sugar\".  02/12/24: Encouraged Pt to schedule transportation and keep a food log for endocrinology appt.  03/26/24               Problem Priority Last Modified     Disease Progression " (Diabetes, Type 1) --  9/20/2023  3:09 PM by Jessica Infante, RN              Goal Recent Progress Last Modified     Disease Progression Prevented or Minimized --  9/20/2023  3:09 PM by Jessica Infante, RN     Evidence-based guidance:   Prepare patient for laboratory and diagnostic exams based on risk and presentation.   Encourage lifestyle changes, such as increased intake of plant-based foods, stress reduction, regular physical activity and smoking cessation to prevent long-term complications and chronic diseases.   Discourage sedentary behavior or sitting longer than 30 minutes without interruption; individualize exercise or activity recommendations with potential limitations, such as neuropathy, retinopathy or the ability to prevent    hyperglycemia or hypoglycemia.   Prepare patient for use of pharmacologic therapy that may include antihypertensive, analgesic, prostaglandin E1 with periodic adjustments based on presenting chronic condition and laboratory results.   Assess signs/symptoms and risk factors for hypertension, sleep-disordered breathing, neuropathy (including changes in gait and balance), retinopathy, nephropathy and sexual dysfunction.   Address pregnancy planning and contraceptive choice, especially when prescribing antihypertensive or statin.   Implement additional individualized goals and interventions based on identified risk factors.   Prepare patient for consultation or referral for specialist care, such as ophthalmology, neurology, cardiology, podiatry, nephrology or perinatology.    Notes:            Task Due Date Last Modified     Monitor and Manage Follow-up for Comorbidities --  3/26/2024  3:27 PM by Jessica Infante, JOHN     Care Management Activities:      - healthy lifestyle promoted  - response to pharmacologic therapy monitored      Notes:   02/12/24: Encouraged to keep a food log for endocrinology appt.  03/26/24                         Current Specialty Plan of Care Status signed by  both patient and provider    Instructions   Patient was provided an electronic copy of care plan  CCM services were explained and offered and patient has accepted these services.  Patient has given their written consent to receive CCM services and understands that this includes the authorization of electronic communication of medical information with the other treating providers.  Patient understands that they may stop CCM services at any time and these changes will be effective at the end of the calendar month and will effectively revocate the agreement of CCM services.  Patient understands that only one practitioner can furnish and be paid for CCM services during one calendar month.  Patient also understands that there may be co-payment or deductible fees in association with CCM services.  Patient will continue with at least monthly follow-up calls with the Ambulatory .    Jessica BOYD  Ambulatory Case Management    3/28/2024, 09:27 EDT

## 2024-03-29 RX ORDER — ACYCLOVIR 400 MG/1
1 TABLET ORAL
Qty: 3 EACH | Refills: 0 | Status: SHIPPED | OUTPATIENT
Start: 2024-03-29

## 2024-04-08 ENCOUNTER — HOSPITAL ENCOUNTER (OUTPATIENT)
Dept: CT IMAGING | Facility: HOSPITAL | Age: 69
Discharge: HOME | End: 2024-04-08
Admitting: NURSE PRACTITIONER
Payer: MEDICARE

## 2024-04-08 DIAGNOSIS — F17.210 CIGARETTE NICOTINE DEPENDENCE WITHOUT COMPLICATION: ICD-10-CM

## 2024-04-08 PROCEDURE — 71271 CT THORAX LUNG CANCER SCR C-: CPT

## 2024-04-10 ENCOUNTER — PATIENT OUTREACH (OUTPATIENT)
Dept: CASE MANAGEMENT | Facility: OTHER | Age: 69
End: 2024-04-10
Payer: MEDICARE

## 2024-04-10 DIAGNOSIS — E10.65 UNCONTROLLED TYPE 1 DIABETES MELLITUS WITH HYPERGLYCEMIA: Primary | ICD-10-CM

## 2024-04-10 DIAGNOSIS — I10 HYPERTENSION, UNSPECIFIED TYPE: ICD-10-CM

## 2024-04-10 NOTE — OUTREACH NOTE
AMBULATORY CASE MANAGEMENT NOTE    Name and Relationship of Patient/Support Person: Rafael Bocanegra - Self    Patient Outreach    Patient and sister/Shoshana contacted AMB CM requesting assistance obtaining insurance approval for his Dexcom G7. Reports that the pharmacy told them it was not approved with their insurance. Patient does not have Medicare A. He only has Medicare Part B and they require supporting documentation for diabetic supplies.     Care Coordination    Care coordination with medical assistant, Cover My Meds, [facility] [employer] ([phone]), [employer] ([phone]), [facility] and Medicare Part B ([phone]). HIGINIO CM systemically contacted all the above to determine appropriate steps for appeal related to PA denial. HIGINIO CM spoke with Medicare Part B and provided requested supporting documentation; signed provider note with supporting diagnosis for medical necessity for PA approval to fax [phone]. Medicare Part B will notify pharmacy via fax of outcome.    Jessica BOYD  Ambulatory Case Management    4/10/2024, 11:13 EDT

## 2024-04-12 ENCOUNTER — TELEPHONE (OUTPATIENT)
Dept: CASE MANAGEMENT | Facility: OTHER | Age: 69
End: 2024-04-12
Payer: MEDICARE

## 2024-04-12 ENCOUNTER — PATIENT OUTREACH (OUTPATIENT)
Dept: CASE MANAGEMENT | Facility: OTHER | Age: 69
End: 2024-04-12
Payer: MEDICARE

## 2024-04-12 DIAGNOSIS — I10 HYPERTENSION, UNSPECIFIED TYPE: ICD-10-CM

## 2024-04-12 DIAGNOSIS — E10.65 UNCONTROLLED TYPE 1 DIABETES MELLITUS WITH HYPERGLYCEMIA: Primary | ICD-10-CM

## 2024-04-12 RX ORDER — ACYCLOVIR 400 MG/1
1 TABLET ORAL
Qty: 1 EACH | Refills: 0 | COMMUNITY
Start: 2024-04-12

## 2024-04-12 RX ORDER — ACYCLOVIR 400 MG/1
1 TABLET ORAL
Qty: 3 EACH | Refills: 11 | Status: SHIPPED | OUTPATIENT
Start: 2024-04-12

## 2024-04-12 NOTE — OUTREACH NOTE
AMBULATORY CASE MANAGEMENT NOTE    Name and Relationship of Patient/Support Person: Medicare - Other    Care Coordination    Care coordination with Medicare Part B, Medicare, [facility], [facility] and medical assistant. HIGINIO URRUTIA contacted [facility] to follow up approval for recommended CGM, Dexcom. [facility] stated that due to supply issues they do longer accept prescriptions for CGM and they should be sent to a mail order supplier. HIGINIO CM contacted Medicare Part B to determine what pharmacy supplier should the prescription be sent to and was informed that they can not release the names of approved suppliers and that CM would need to contact Medicare for that information and then refax requested supporting documentation for approval to [phone]. Medicare reported that independent local pharmacies and most mail order DME's can complete the request.    GHADA requested that MA fax prescription to [facility] for them to review coverage options. Provided [facility] with necessary patient demographics and she will contact CM in regards to approval status. PA approved for Dexcom G7 Sensor (Key: [id number]).    CM notified Patient and sister that Dexcom G7 Sensor has been approved and would need to be picked up at [facility]. Reviewed location of new pharmacy and Patient/Sister verbalized understanding and would  CGM on Monday. Reinforced that the office had Dexcom samples should he want to come by today and pick one up. Next outreach scheduled.    Jessica BOYD  Ambulatory Case Management    4/12/2024, 10:30 EDT

## 2024-04-19 ENCOUNTER — HOSPITAL ENCOUNTER (OUTPATIENT)
Dept: ULTRASOUND IMAGING | Facility: HOSPITAL | Age: 69
Discharge: HOME | End: 2024-04-19
Payer: MEDICARE

## 2024-04-19 ENCOUNTER — TELEPHONE (OUTPATIENT)
Dept: CASE MANAGEMENT | Facility: OTHER | Age: 69
End: 2024-04-19
Payer: MEDICARE

## 2024-04-19 DIAGNOSIS — I73.9 CLAUDICATION: ICD-10-CM

## 2024-04-19 DIAGNOSIS — E10.65 UNCONTROLLED TYPE 1 DIABETES MELLITUS WITH HYPERGLYCEMIA: Primary | ICD-10-CM

## 2024-04-19 DIAGNOSIS — R74.8 ELEVATED LIVER ENZYMES: ICD-10-CM

## 2024-04-19 DIAGNOSIS — I10 HYPERTENSION, UNSPECIFIED TYPE: ICD-10-CM

## 2024-04-19 DIAGNOSIS — F17.210 CIGARETTE NICOTINE DEPENDENCE WITHOUT COMPLICATION: ICD-10-CM

## 2024-04-19 PROCEDURE — 93923 UPR/LXTR ART STDY 3+ LVLS: CPT

## 2024-04-19 PROCEDURE — 76705 ECHO EXAM OF ABDOMEN: CPT

## 2024-04-19 NOTE — TELEPHONE ENCOUNTER
HIGINIO URRUTIA outreach with Patient; unable to leave a voicemail requesting a return call to JOHN Lopez with Chronic Care Management at [phone].

## 2024-04-22 ENCOUNTER — PATIENT OUTREACH (OUTPATIENT)
Dept: CASE MANAGEMENT | Facility: OTHER | Age: 69
End: 2024-04-22
Payer: MEDICARE

## 2024-04-22 DIAGNOSIS — I10 HYPERTENSION, UNSPECIFIED TYPE: ICD-10-CM

## 2024-04-22 DIAGNOSIS — E10.65 UNCONTROLLED TYPE 1 DIABETES MELLITUS WITH HYPERGLYCEMIA: Primary | ICD-10-CM

## 2024-04-22 NOTE — OUTREACH NOTE
AMBULATORY CASE MANAGEMENT NOTE    Names and Relationships of Patient/Support Persons: Caller: Rafael Bocanegra; Relationship: Self  Caller: Rafael Bocanegra; Relationship: Self -     Patient Outreach    AMB  outreach with Patient and sister. Reviewed upcoming appointment with Primary Care. Confirmed that Patient had picked up his Dexcom supplies from the new pharmacy and that sister would accompany him to appointment on 04/24/26 at 08:00 AM. Encouraged contacting [facility] for any questions or concerns. Next outreach scheduled.    Jessica BOYD  Ambulatory Case Management    4/22/2024, 12:35 EDT

## 2024-04-24 ENCOUNTER — OFFICE VISIT (OUTPATIENT)
Dept: INTERNAL MEDICINE | Facility: CLINIC | Age: 69
End: 2024-04-24
Payer: MEDICARE

## 2024-04-24 ENCOUNTER — TELEPHONE (OUTPATIENT)
Dept: INTERNAL MEDICINE | Facility: CLINIC | Age: 69
End: 2024-04-24

## 2024-04-24 ENCOUNTER — PATIENT OUTREACH (OUTPATIENT)
Dept: CASE MANAGEMENT | Facility: OTHER | Age: 69
End: 2024-04-24
Payer: MEDICARE

## 2024-04-24 VITALS
WEIGHT: 155 LBS | DIASTOLIC BLOOD PRESSURE: 68 MMHG | OXYGEN SATURATION: 98 % | TEMPERATURE: 98.7 F | SYSTOLIC BLOOD PRESSURE: 124 MMHG | HEIGHT: 73 IN | HEART RATE: 97 BPM | BODY MASS INDEX: 20.54 KG/M2

## 2024-04-24 DIAGNOSIS — E10.65 UNCONTROLLED TYPE 1 DIABETES MELLITUS WITH HYPERGLYCEMIA: Primary | ICD-10-CM

## 2024-04-24 DIAGNOSIS — Z12.11 COLON CANCER SCREENING: ICD-10-CM

## 2024-04-24 DIAGNOSIS — I10 HYPERTENSION, UNSPECIFIED TYPE: ICD-10-CM

## 2024-04-24 DIAGNOSIS — E78.2 MIXED HYPERLIPIDEMIA: ICD-10-CM

## 2024-04-24 DIAGNOSIS — E03.9 ACQUIRED HYPOTHYROIDISM: ICD-10-CM

## 2024-04-24 DIAGNOSIS — Z72.0 TOBACCO ABUSE: ICD-10-CM

## 2024-04-24 PROCEDURE — G2211 COMPLEX E/M VISIT ADD ON: HCPCS | Performed by: NURSE PRACTITIONER

## 2024-04-24 PROCEDURE — 1125F AMNT PAIN NOTED PAIN PRSNT: CPT | Performed by: NURSE PRACTITIONER

## 2024-04-24 PROCEDURE — 1160F RVW MEDS BY RX/DR IN RCRD: CPT | Performed by: NURSE PRACTITIONER

## 2024-04-24 PROCEDURE — 1159F MED LIST DOCD IN RCRD: CPT | Performed by: NURSE PRACTITIONER

## 2024-04-24 PROCEDURE — 3052F HG A1C>EQUAL 8.0%<EQUAL 9.0%: CPT | Performed by: NURSE PRACTITIONER

## 2024-04-24 PROCEDURE — 99214 OFFICE O/P EST MOD 30 MIN: CPT | Performed by: NURSE PRACTITIONER

## 2024-04-24 RX ORDER — TRIAMCINOLONE ACETONIDE 5 MG/G
1 CREAM TOPICAL 2 TIMES DAILY PRN
Qty: 454 G | Refills: 0 | Status: SHIPPED | OUTPATIENT
Start: 2024-04-24

## 2024-04-24 RX ORDER — INSULIN HUMAN 100 [IU]/ML
INJECTION, SOLUTION PARENTERAL
Qty: 30 ML | Refills: 11 | Status: SHIPPED | OUTPATIENT
Start: 2024-04-24

## 2024-04-24 RX ORDER — BUPROPION HYDROCHLORIDE 150 MG/1
150 TABLET, EXTENDED RELEASE ORAL 2 TIMES DAILY
Qty: 180 TABLET | Refills: 1 | Status: SHIPPED | OUTPATIENT
Start: 2024-04-24

## 2024-04-24 NOTE — TELEPHONE ENCOUNTER
----- Message from ELIAS Jaime sent at 4/24/2024  7:47 AM EDT -----  Testing for circulation in the lower extremities were normal. No evidence of arterial disease

## 2024-04-24 NOTE — TELEPHONE ENCOUNTER
Spoke with pharmacy, patient is not on U-500 insulin, gave verbal to fill as what is compatible with the patients U-100 insulin.

## 2024-04-24 NOTE — TELEPHONE ENCOUNTER
Pharmacy Name:  [facility]    Reference Number (if applicable): N/A    Pharmacy representative name: ANALI    Pharmacy representative phone number: [phone]    What medication are you calling in regards to: Insulin Syringe/Needle U-500 31G X 6MM 0.5 ML misc     What question does the pharmacy have: REQUESTING PHONE CALL FROM CLINICAL    Who is the provider that prescribed the medication: ANDREAS    Additional notes: N/A

## 2024-04-24 NOTE — TELEPHONE ENCOUNTER
"Relay     \"Testing for circulation in the lower extremities were normal. No evidence of arterial disease \"        "

## 2024-04-24 NOTE — OUTREACH NOTE
AMBULATORY CASE MANAGEMENT NOTE    Names and Relationships of Patient/Support Persons: Caller: Rafael Bocanegra; Relationship: Self -     Patient Outreach    AMB CM met with Patient in Exam Rm 14. Patient expressed gratitude for assistance with transportation and diabetic supplies. He reports that his hypoglycemic episodes are not as common. He would like to stop smoking. Provider was present and reported that she is making medication adjustments and encouraged Patient to contact 1-800-Quit Now. Patient stated that he has tried the patches before with no success.    Encouraged Patient to keep up the good work and contact AMB CM for any questions or concerns. Next outreach scheduled.    Jessica BOYD  Ambulatory Case Management    4/24/2024, 13:30 EDT

## 2024-04-26 ENCOUNTER — PATIENT OUTREACH (OUTPATIENT)
Dept: CASE MANAGEMENT | Facility: OTHER | Age: 69
End: 2024-04-26
Payer: MEDICARE

## 2024-04-26 ENCOUNTER — TELEPHONE (OUTPATIENT)
Dept: SURGERY | Facility: CLINIC | Age: 69
End: 2024-04-26
Payer: MEDICARE

## 2024-04-26 DIAGNOSIS — E10.65 UNCONTROLLED TYPE 1 DIABETES MELLITUS WITH HYPERGLYCEMIA: Primary | ICD-10-CM

## 2024-04-26 DIAGNOSIS — I10 HYPERTENSION, UNSPECIFIED TYPE: ICD-10-CM

## 2024-04-26 RX ORDER — BISACODYL 5 MG/1
TABLET, DELAYED RELEASE ORAL
Qty: 4 TABLET | Refills: 0 | Status: SHIPPED | OUTPATIENT
Start: 2024-04-26

## 2024-04-26 RX ORDER — POLYETHYLENE GLYCOL 3350 17 G/17G
POWDER, FOR SOLUTION ORAL
Qty: 238 EACH | Refills: 0 | Status: SHIPPED | OUTPATIENT
Start: 2024-04-26

## 2024-04-26 NOTE — OUTREACH NOTE
CCM End of Month Documentation    This Chronic Medical Management Care Plan for Rafael Bocanegra, 69 y.o. male, has been monitored and managed; reviewed and a new plan of care implemented for the month of April.  A cumulative time of 207  minutes was spent on this patient record this month, including face to face with provider; phone call with patient; phone call with relative; phone call with other providers; phone call with pharmacist; electronic communication with other providers; electronic communication with pharmacist; chart review; face to face visit with provider and patient; electronic communication with primary care provider, Requirements for CGM PA with Medicare B.    Regarding the patient's problems: has Type 1 diabetes mellitus with hyperglycemia; Moderate persistent asthma without complication; Mixed hyperlipidemia; Acquired hypothyroidism; Vitamin D deficiency; Uncontrolled type 1 diabetes mellitus with hyperglycemia; and Uncontrolled type 1 diabetes mellitus with hypoglycemia without coma on their problem list., the following items were addressed: medical records; medications, Appeal for PA on Dexcom G7 and any changes can be found within the plan section of the note.  A detailed listing of time spent for chronic care management is tracked within each outreach encounter.  Current medications include:  has a current medication list which includes the following prescription(s): albuterol sulfate hfa, aspirin, atorvastatin, bupropion sr, dexcom g7 , dexcom g7 sensor, gvoke hypopen 2-pack, insulin glargine, humulin r, insulin syringe/needle u-500, levothyroxine, onetouch ultra, triamcinolone, and vitamin d. and the patient is reported to be patient is noncompliant with medication protocol, Pt admits to being non adherent with dietary recommendations; reports that he has all recommended medications,  Medications are reported to be non-effective in controlling symptoms and changes have been made to  the medication protocol, A1c 8.5 down from 11.4.  Regarding these diagnoses, referrals were made to the following provider(s):  None.  All notes on chart for PCP to review.    The patient was monitored remotely for blood glucose; medications, Appeal completed for denied PA on CGM.    The patient's physical needs include:  eye care; medication education, May possibly need transportation for upcoming appt; provided information to schedule transportation.     The patient's mental support needs include:  needs met    The patient's cognitive support needs include:  needs met    The patient's psychosocial support needs include:  medication management or adherence    The patient's functional needs include: medication education; eye care, Appeal for PA on Dexcom G7    The patient's environmental needs include:  not applicable, Resides with sister and nephew, does not drive or have an auto in his name; needs help with transportation for medial appointments    Care Plan overall comments:  Appeal for PA on Dexcom G7 submitted to Medicare Part B faxed to [phone]    Refer to previous outreach notes for more information on the areas listed above.    Monthly Billing Diagnoses  (E10.65) Uncontrolled type 1 diabetes mellitus with hyperglycemia    (I10) Hypertension, unspecified type    Medications   Medications have been reconciled    Care Plan progress this month:      Recently Modified Care Plans Updates made since 3/26/2024 12:00 AM       Diabetes Type 1 (Adult)           Problem Priority Last Modified     Glycemic Management (Diabetes, Type 1) --  9/20/2023  3:09 PM by Jessica Infante, RN              Goal Recent Progress Last Modified     Glycemic Management Optimized --  9/20/2023  3:09 PM by Jessica Infante, RN     Evidence-based guidance:   Anticipate A1C testing (point-of-care) every 3 to 6 months based on goal attainment.   Review mutually set A1C goal or target range.   Anticipate screening for autoimmune thyroid  "disease, vitamin B12 deficiency with anemia and celiac disease based on presenting signs/symptoms.   Anticipate use of insulin with periodic adjustments; consider active involvement of pharmacist.   Provide medical nutrition therapy and development of individualized eating plan.   Compare self-reported symptoms of hypo or hyperglycemia to blood glucose levels, diet and fluid intake, current medications, psychosocial and physiologic stressors, change in activity and barriers to care adherence.   Promote self-monitoring of blood glucose levels, interval or continuous.   Assess and address barriers to management plan, such as food insecurity, age, developmental ability, depression, anxiety, fear of hypoglycemia or weight gain, as well as medication cost, side effects and complicated regimen.   Assess for disordered eating behaviors based on presenting signs/symptoms and risk factors.   Consider referral to community-based diabetes education program, visiting nurse, community health worker or health .   Encourage regular dental care for treatment of periodontal disease; refer to dental provider when needed.    Notes:            Task Due Date Last Modified     Alleviate Barriers to Glycemic Management --  4/10/2024 11:07 AM by Jessica Infante RN     Care Management Activities:      - barriers to adherence to treatment plan identified  - resources required to improve adherence to care identified      Notes:   10/16/23: Dexcom 7 approved with no co pay; arrived in the mail this AM. Pt phone not compatible with ; nephew to  a Dexcom 7  sample (left at the ) from the office.  12/04/2023: \"I been a diabetic almost 40 years, I don't really look at that thing or worry about my sugar\".  02/12/24: Encouraged Pt to schedule transportation and keep a food log for endocrinology appt.  03/26/24  04/10/2024: Medicare Part B Fax for [phone]               Problem Priority Last Modified     " Disease Progression (Diabetes, Type 1) --  9/20/2023  3:09 PM by Jessica Infante, RN              Goal Recent Progress Last Modified     Disease Progression Prevented or Minimized --  9/20/2023  3:09 PM by Jessica Infante, RN     Evidence-based guidance:   Prepare patient for laboratory and diagnostic exams based on risk and presentation.   Encourage lifestyle changes, such as increased intake of plant-based foods, stress reduction, regular physical activity and smoking cessation to prevent long-term complications and chronic diseases.   Discourage sedentary behavior or sitting longer than 30 minutes without interruption; individualize exercise or activity recommendations with potential limitations, such as neuropathy, retinopathy or the ability to prevent    hyperglycemia or hypoglycemia.   Prepare patient for use of pharmacologic therapy that may include antihypertensive, analgesic, prostaglandin E1 with periodic adjustments based on presenting chronic condition and laboratory results.   Assess signs/symptoms and risk factors for hypertension, sleep-disordered breathing, neuropathy (including changes in gait and balance), retinopathy, nephropathy and sexual dysfunction.   Address pregnancy planning and contraceptive choice, especially when prescribing antihypertensive or statin.   Implement additional individualized goals and interventions based on identified risk factors.   Prepare patient for consultation or referral for specialist care, such as ophthalmology, neurology, cardiology, podiatry, nephrology or perinatology.    Notes:            Task Due Date Last Modified     Monitor and Manage Follow-up for Comorbidities --  4/10/2024 11:08 AM by Jessica Infante, JOHN     Care Management Activities:      - healthy lifestyle promoted      Notes:   02/12/24: Encouraged to keep a food log for endocrinology appt.  03/26/24  04/10/24                         Current Specialty Plan of Care Status signed by both patient and  provider    Instructions   Patient was provided an electronic copy of care plan  CCM services were explained and offered and patient has accepted these services.  Patient has given their written consent to receive CCM services and understands that this includes the authorization of electronic communication of medical information with the other treating providers.  Patient understands that they may stop CCM services at any time and these changes will be effective at the end of the calendar month and will effectively revocate the agreement of CCM services.  Patient understands that only one practitioner can furnish and be paid for CCM services during one calendar month.  Patient also understands that there may be co-payment or deductible fees in association with CCM services.  Patient will continue with at least monthly follow-up calls with the Ambulatory .    Jessica BOYD  Ambulatory Case Management    4/26/2024, 09:22 EDT

## 2024-04-26 NOTE — TELEPHONE ENCOUNTER
PRESCREENING FOR OPEN ACCESS SCHEDULING    Rafael Bocanegra, 1955  [id number]    04/26/24    If, the patient answers yes to any of the following questions the provider will be informed prior to scheduling open access for approval and documented in the chart.    [x]  Yes  [] No    1. Have you ever had a colonoscopy in the past?      When:  thinks about 5 yrs ago      Where: [place], [place]       Polyps or other:     []  Yes  [x] No    2. Family history of colon cancer?      Relation:       Age of onset:       Do you currently have any of the following?    []  Yes  [x] No  Rectal bleeding, if so, how long?     []  Yes  [x] No  Abdominal pain, if so, how long?    []  Yes  [x] No  Constipation, if so, how long?    []  Yes  [x] No  Diarrhea, if so, how long?    []  Yes  [x] No  Weight loss, is so, how much?    [] Yes  [x] No  Small caliber stool, if so, how long?    []Yes  [x] No  Do you have Hemorroids?    []Yes  [x] No  Have you been diagnosed with Anemia?    []Yes  [x] No  Do you have difficulty swallowing?    []Yes  [x] No  Do you have acid reflux?    Have you ever had any of the following conditions?    [] Yes  [x] No  Heart attack?      When?       Last cardiac workup?     Blood thinners?    [] Yes  [x] No   Lung problems, asthma or COPD?  [] Yes  [x] No  Oxygen required?       [] Yes  [x] No  Stroke?     [] Yes  [x] No  Have you ever had a reaction to anesthesia?

## 2024-04-26 NOTE — TELEPHONE ENCOUNTER
Pt would like to be scheduled at [facility] on 07/24 W/ Dr. Gardner. Verified listed [facility] pharmacy & insurance. Thank you.

## 2024-04-29 DIAGNOSIS — Z12.11 ENCOUNTER FOR SCREENING COLONOSCOPY: Primary | ICD-10-CM

## 2024-04-29 RX ORDER — SODIUM CHLORIDE 0.9 % (FLUSH) 0.9 %
10 SYRINGE (ML) INJECTION AS NEEDED
OUTPATIENT
Start: 2024-04-29

## 2024-04-29 RX ORDER — SODIUM CHLORIDE, SODIUM LACTATE, POTASSIUM CHLORIDE, CALCIUM CHLORIDE 600; 310; 30; 20 MG/100ML; MG/100ML; MG/100ML; MG/100ML
50 INJECTION, SOLUTION INTRAVENOUS CONTINUOUS
OUTPATIENT
Start: 2024-04-29

## 2024-04-29 RX ORDER — SODIUM CHLORIDE 0.9 % (FLUSH) 0.9 %
10 SYRINGE (ML) INJECTION EVERY 12 HOURS SCHEDULED
OUTPATIENT
Start: 2024-04-29

## 2024-04-29 RX ORDER — SODIUM CHLORIDE 9 MG/ML
40 INJECTION, SOLUTION INTRAVENOUS AS NEEDED
OUTPATIENT
Start: 2024-04-29

## 2024-05-07 ENCOUNTER — PATIENT OUTREACH (OUTPATIENT)
Dept: CASE MANAGEMENT | Facility: OTHER | Age: 69
End: 2024-05-07
Payer: MEDICARE

## 2024-05-07 DIAGNOSIS — E10.65 UNCONTROLLED TYPE 1 DIABETES MELLITUS WITH HYPERGLYCEMIA: Primary | ICD-10-CM

## 2024-05-07 DIAGNOSIS — I10 HYPERTENSION, UNSPECIFIED TYPE: ICD-10-CM

## 2024-05-08 ENCOUNTER — PATIENT OUTREACH (OUTPATIENT)
Dept: CASE MANAGEMENT | Facility: OTHER | Age: 69
End: 2024-05-08
Payer: MEDICARE

## 2024-05-08 DIAGNOSIS — E10.65 UNCONTROLLED TYPE 1 DIABETES MELLITUS WITH HYPERGLYCEMIA: Primary | ICD-10-CM

## 2024-05-08 DIAGNOSIS — I10 HYPERTENSION, UNSPECIFIED TYPE: ICD-10-CM

## 2024-05-08 RX ORDER — ACYCLOVIR 400 MG/1
1 TABLET ORAL
Qty: 9 EACH | Refills: 3 | Status: SHIPPED | OUTPATIENT
Start: 2024-05-08

## 2024-05-08 RX ORDER — ACYCLOVIR 400 MG/1
1 TABLET ORAL
Qty: 1 EACH | Refills: 0 | Status: SHIPPED | OUTPATIENT
Start: 2024-05-08

## 2024-05-09 ENCOUNTER — PATIENT OUTREACH (OUTPATIENT)
Dept: CASE MANAGEMENT | Facility: OTHER | Age: 69
End: 2024-05-09
Payer: MEDICARE

## 2024-05-09 DIAGNOSIS — I10 HYPERTENSION, UNSPECIFIED TYPE: ICD-10-CM

## 2024-05-09 DIAGNOSIS — E10.65 UNCONTROLLED TYPE 1 DIABETES MELLITUS WITH HYPERGLYCEMIA: Primary | ICD-10-CM

## 2024-05-13 ENCOUNTER — PATIENT OUTREACH (OUTPATIENT)
Dept: CASE MANAGEMENT | Facility: OTHER | Age: 69
End: 2024-05-13
Payer: MEDICARE

## 2024-05-13 DIAGNOSIS — I10 HYPERTENSION, UNSPECIFIED TYPE: ICD-10-CM

## 2024-05-13 DIAGNOSIS — E10.65 UNCONTROLLED TYPE 1 DIABETES MELLITUS WITH HYPERGLYCEMIA: Primary | ICD-10-CM

## 2024-05-13 NOTE — OUTREACH NOTE
AMBULATORY CASE MANAGEMENT NOTE    Names and Relationships of Patient/Support Persons: Caller: Rafael Bocanegra; Relationship: Self  Caller: [facility]; Relationship: Other -     Care Coordination    Care coordination with [facility]. HIGINIO CM spoke with representative who confirms receiving requested documentation and prescriptions. Reports that the QA department will confirm if request meets medical neccessity and then reach out to the Patient. No time frame was provided regarding length of process. Next outreach scheduled.    Jessica BOYD  Ambulatory Case Management    5/13/2024, 09:53 EDT

## 2024-05-15 ENCOUNTER — TELEPHONE (OUTPATIENT)
Dept: INTERNAL MEDICINE | Facility: CLINIC | Age: 69
End: 2024-05-15
Payer: MEDICARE

## 2024-05-15 NOTE — TELEPHONE ENCOUNTER
Caller: ANGEL TILLEY [facility]    Relationship:     Best call back number: [phone]    What form or medical record are you requesting: OFFICE VISIT NOTE IN LAST TO SHOW THAT HE IS STILL USING SENSORS    How would you like to receive the form or medical records (pick-up, mail, fax): FAX  If fax, what is the fax number:     [phone]      Additional notes:     A REQUEST WAS FAXED TO OUR OFFICE ON 4/11 AND 4/26

## 2024-05-20 ENCOUNTER — PATIENT OUTREACH (OUTPATIENT)
Dept: CASE MANAGEMENT | Facility: OTHER | Age: 69
End: 2024-05-20
Payer: MEDICARE

## 2024-05-20 DIAGNOSIS — I10 HYPERTENSION, UNSPECIFIED TYPE: ICD-10-CM

## 2024-05-20 DIAGNOSIS — E10.65 UNCONTROLLED TYPE 1 DIABETES MELLITUS WITH HYPERGLYCEMIA: Primary | ICD-10-CM

## 2024-05-20 NOTE — OUTREACH NOTE
AMBULATORY CASE MANAGEMENT NOTE    Names and Relationships of Patient/Support Persons: Caller: Rafael Bocanegra; Relationship: Self  Caller: [facility]; Relationship: Other  Caller: Pharmacist - [facility]; Relationship: Other -     Care Coordination    HIGINIO CM received call from Pharmacist with [facility] confirming that CGM has been approved at no cost and will ship to Patient's address on record prior to end of week.    Reviewed about information with Patient and his sister. He reports a hypoglycemic episode yesterday. Reviewed how to prepare/handle hypoglycemic episodes outside of the home. Reviewed with Patient's sister that diet soda does not have the sugar content to be helpful with attempts to increase BG and to keep previously discussed items such as apple juice, candy, glucose tablets etc.     Reinforced contacting AMB CM for any questions or concerns. Next outreach scheduled.    Education Documentation  Consistent Eating Pattern, taught by Jessica Infante, RN at 5/20/2024  3:09 PM.  Learner: Family, Patient  Readiness: Acceptance  Method: Explanation  Response: Verbalizes Understanding, Needs Reinforcement    Hypoglycemia Identification and Treatment, taught by Jessica Infante, RN at 5/20/2024  3:09 PM.  Learner: Family, Patient  Readiness: Acceptance  Method: Explanation  Response: Verbalizes Understanding, Needs Reinforcement        Jessica BOYD  Ambulatory Case Management    5/20/2024, 15:09 EDT

## 2024-05-31 ENCOUNTER — PATIENT OUTREACH (OUTPATIENT)
Dept: CASE MANAGEMENT | Facility: OTHER | Age: 69
End: 2024-05-31
Payer: MEDICARE

## 2024-05-31 DIAGNOSIS — I10 HYPERTENSION, UNSPECIFIED TYPE: ICD-10-CM

## 2024-05-31 DIAGNOSIS — E10.65 UNCONTROLLED TYPE 1 DIABETES MELLITUS WITH HYPERGLYCEMIA: Primary | ICD-10-CM

## 2024-05-31 NOTE — OUTREACH NOTE
[facility] End of Month Documentation    This Chronic Medical Management Care Plan for Rafael Bocanegra, 69 y.o. male, has been monitored and managed and a new plan of care implemented for the month of May.  A cumulative time of 113  minutes was spent on this patient record this month, including face to face with provider; phone call with patient; phone call with relative; phone call with pharmacist; phone call with other providers; electronic communication with other providers; chart review, Barriers to care, [facility] pharmacy reports that they lose money each time they fill the CGM sensors.    Regarding the patient's problems: has Type 1 diabetes mellitus with hyperglycemia; Moderate persistent asthma without complication; Mixed hyperlipidemia; Acquired hypothyroidism; Vitamin D deficiency; Uncontrolled type 1 diabetes mellitus with hyperglycemia; and Uncontrolled type 1 diabetes mellitus with hypoglycemia without coma on their problem list., the following items were addressed: medical records; medications; referrals to community service providers, barriers to care, CGM, cost and supply and any changes can be found within the plan section of the note.  A detailed listing of time spent for chronic care management is tracked within each outreach encounter.  Current medications include:  has a current medication list which includes the following prescription(s): albuterol sulfate hfa, aspirin, atorvastatin, bisacodyl, bupropion sr, dexcom g7 , dexcom g7 sensor, gvoke hypopen 2-pack, insulin glargine, humulin r, insulin syringe/needle u-500, levothyroxine, onetouch ultra, polyethylene glycol, triamcinolone, and vitamin d. and the patient is reported to be patient is noncompliant with medication protocol, Pt admits to being non adherent with dietary recommendations; reports that he has all recommended medications,  Medications are reported to be non-effective in controlling symptoms and changes have been made to the  medication protocol, A1c 8.5 down from 11.4.  Regarding these diagnoses, referrals were made to the following provider(s):  None.  All notes on chart for PCP to review.    The patient was monitored remotely for blood glucose; medications, Uses Dexcom G7 CGM.    The patient's physical needs include:  eye care; medication education, May possibly need transportation for upcoming appt; provided information to schedule transportation.     The patient's mental support needs include:  needs met    The patient's cognitive support needs include:  needs met    The patient's psychosocial support needs include:  medication management or adherence    The patient's functional needs include: medication education; eye care, Appeal for PA on Dexcom G7    The patient's environmental needs include:  not applicable, Resides with sister and nephew, does not drive or have an auto in his name; needs help with transportation for medial appointments    Care Plan overall comments:  Referral to Diabetes Management for Dexcom G7, order # [id number]    Refer to previous outreach notes for more information on the areas listed above.    Monthly Billing Diagnoses  (E10.65) Uncontrolled type 1 diabetes mellitus with hyperglycemia    (I10) Hypertension, unspecified type    Medications   Medications have been reconciled    Care Plan progress this month:      Recently Modified Care Plans Updates made since 4/30/2024 12:00 AM       Diabetes Type 1 (Adult)           Problem Priority Last Modified     Glycemic Management (Diabetes, Type 1) --  9/20/2023  3:09 PM by Jessica Infante, RN              Goal Recent Progress Last Modified     Glycemic Management Optimized --  9/20/2023  3:09 PM by Jessica Infante, RN     Evidence-based guidance:   Anticipate A1C testing (point-of-care) every 3 to 6 months based on goal attainment.   Review mutually set A1C goal or target range.   Anticipate screening for autoimmune thyroid disease, vitamin B12 deficiency with  "anemia and celiac disease based on presenting signs/symptoms.   Anticipate use of insulin with periodic adjustments; consider active involvement of pharmacist.   Provide medical nutrition therapy and development of individualized eating plan.   Compare self-reported symptoms of hypo or hyperglycemia to blood glucose levels, diet and fluid intake, current medications, psychosocial and physiologic stressors, change in activity and barriers to care adherence.   Promote self-monitoring of blood glucose levels, interval or continuous.   Assess and address barriers to management plan, such as food insecurity, age, developmental ability, depression, anxiety, fear of hypoglycemia or weight gain, as well as medication cost, side effects and complicated regimen.   Assess for disordered eating behaviors based on presenting signs/symptoms and risk factors.   Consider referral to community-based diabetes education program, visiting nurse, community health worker or health .   Encourage regular dental care for treatment of periodontal disease; refer to dental provider when needed.    Notes:            Task Due Date Last Modified     Alleviate Barriers to Glycemic Management --  5/7/2024  3:03 PM by Jessica Infante RN     Care Management Activities:      - barriers to adherence to treatment plan identified  - resources required to improve adherence to care identified  - self-awareness of signs/symptoms of hypo or hyperglycemia encouraged      Notes:   10/16/23: Dexcom 7 approved with no co pay; arrived in the mail this AM. Pt phone not compatible with ; nephew to  a Dexcom 7  sample (left at the ) from the office.  12/04/2023: \"I been a diabetic almost 40 years, I don't really look at that thing or worry about my sugar\".  02/12/24: Encouraged Pt to schedule transportation and keep a food log for endocrinology appt.  03/26/24  04/10/2024: Medicare Part B Fax for [phone]  05/07/24: " [facility] reports that they lose money with every CGM sensor they fill for the patient and will no longer fill his CGM prescription. CGM order sent to Diabetes [facility]; order # [id number]               Problem Priority Last Modified     Disease Progression (Diabetes, Type 1) --  9/20/2023  3:09 PM by Jessica Infante RN              Goal Recent Progress Last Modified     Disease Progression Prevented or Minimized --  9/20/2023  3:09 PM by Jessica Infante, RN     Evidence-based guidance:   Prepare patient for laboratory and diagnostic exams based on risk and presentation.   Encourage lifestyle changes, such as increased intake of plant-based foods, stress reduction, regular physical activity and smoking cessation to prevent long-term complications and chronic diseases.   Discourage sedentary behavior or sitting longer than 30 minutes without interruption; individualize exercise or activity recommendations with potential limitations, such as neuropathy, retinopathy or the ability to prevent    hyperglycemia or hypoglycemia.   Prepare patient for use of pharmacologic therapy that may include antihypertensive, analgesic, prostaglandin E1 with periodic adjustments based on presenting chronic condition and laboratory results.   Assess signs/symptoms and risk factors for hypertension, sleep-disordered breathing, neuropathy (including changes in gait and balance), retinopathy, nephropathy and sexual dysfunction.   Address pregnancy planning and contraceptive choice, especially when prescribing antihypertensive or statin.   Implement additional individualized goals and interventions based on identified risk factors.   Prepare patient for consultation or referral for specialist care, such as ophthalmology, neurology, cardiology, podiatry, nephrology or perinatology.    Notes:            Task Due Date Last Modified     Monitor and Manage Follow-up for Comorbidities --  5/7/2024  3:03 PM by Jessica Infante, JOHN     Care  Management Activities:      - not discussed during this outreach      Notes:   02/12/24: Encouraged to keep a food log for endocrinology appt.  03/26/24  04/10/24                         Current Specialty Plan of Care Status signed by both patient and provider    Instructions   Patient was provided an electronic copy of care plan  CCM services were explained and offered and patient has accepted these services.  Patient has given their written consent to receive CCM services and understands that this includes the authorization of electronic communication of medical information with the other treating providers.  Patient understands that they may stop CCM services at any time and these changes will be effective at the end of the calendar month and will effectively revocate the agreement of CCM services.  Patient understands that only one practitioner can furnish and be paid for CCM services during one calendar month.  Patient also understands that there may be co-payment or deductible fees in association with CCM services.  Patient will continue with at least monthly follow-up calls with the Ambulatory .    Jessica BOYD  Ambulatory Case Management    5/31/2024, 10:59 EDT

## 2024-06-19 ENCOUNTER — TELEPHONE (OUTPATIENT)
Dept: CASE MANAGEMENT | Facility: OTHER | Age: 69
End: 2024-06-19
Payer: MEDICARE

## 2024-06-19 DIAGNOSIS — E10.65 UNCONTROLLED TYPE 1 DIABETES MELLITUS WITH HYPERGLYCEMIA: Primary | ICD-10-CM

## 2024-06-19 DIAGNOSIS — I10 HYPERTENSION, UNSPECIFIED TYPE: ICD-10-CM

## 2024-06-19 NOTE — TELEPHONE ENCOUNTER
HIGINIO CM outreach with Patient; left a voicemail requesting a return call to JOHN Lopez with Chronic Care Management at [phone].

## 2024-06-25 PROBLEM — Z12.11 ENCOUNTER FOR SCREENING COLONOSCOPY: Status: ACTIVE | Noted: 2024-04-29

## 2024-07-10 ENCOUNTER — OFFICE VISIT (OUTPATIENT)
Dept: ENDOCRINOLOGY | Facility: CLINIC | Age: 69
End: 2024-07-10
Payer: MEDICARE

## 2024-07-10 VITALS
SYSTOLIC BLOOD PRESSURE: 140 MMHG | BODY MASS INDEX: 21.34 KG/M2 | OXYGEN SATURATION: 98 % | DIASTOLIC BLOOD PRESSURE: 80 MMHG | HEIGHT: 73 IN | WEIGHT: 161 LBS | HEART RATE: 86 BPM

## 2024-07-10 DIAGNOSIS — E03.9 ACQUIRED HYPOTHYROIDISM: ICD-10-CM

## 2024-07-10 DIAGNOSIS — E10.65 TYPE 1 DIABETES MELLITUS WITH HYPERGLYCEMIA: Primary | ICD-10-CM

## 2024-07-10 LAB
ALBUMIN SERPL-MCNC: 4.4 G/DL (ref 3.5–5.2)
ALBUMIN/GLOB SERPL: 2 G/DL
ALP SERPL-CCNC: 94 U/L (ref 39–117)
ALT SERPL W P-5'-P-CCNC: 9 U/L (ref 1–41)
ANION GAP SERPL CALCULATED.3IONS-SCNC: 9.6 MMOL/L (ref 5–15)
AST SERPL-CCNC: 12 U/L (ref 1–40)
BILIRUB SERPL-MCNC: 0.5 MG/DL (ref 0–1.2)
BUN SERPL-MCNC: 6 MG/DL (ref 8–23)
BUN/CREAT SERPL: 8.1 (ref 7–25)
CALCIUM SPEC-SCNC: 9.6 MG/DL (ref 8.6–10.5)
CHLORIDE SERPL-SCNC: 100 MMOL/L (ref 98–107)
CHOLEST SERPL-MCNC: 129 MG/DL (ref 0–200)
CO2 SERPL-SCNC: 28.4 MMOL/L (ref 22–29)
CREAT SERPL-MCNC: 0.74 MG/DL (ref 0.76–1.27)
EGFRCR SERPLBLD CKD-EPI 2021: 99.3 ML/MIN/1.73
EXPIRATION DATE: ABNORMAL
EXPIRATION DATE: ABNORMAL
GLOBULIN UR ELPH-MCNC: 2.2 GM/DL
GLUCOSE BLDC GLUCOMTR-MCNC: 324 MG/DL (ref 70–130)
GLUCOSE SERPL-MCNC: 230 MG/DL (ref 65–99)
HBA1C MFR BLD: 7.8 % (ref 4.5–5.7)
HDLC SERPL-MCNC: 72 MG/DL (ref 40–60)
LDLC SERPL CALC-MCNC: 47 MG/DL (ref 0–100)
LDLC/HDLC SERPL: 0.68 {RATIO}
Lab: ABNORMAL
Lab: ABNORMAL
POTASSIUM SERPL-SCNC: 3.8 MMOL/L (ref 3.5–5.2)
PROT SERPL-MCNC: 6.6 G/DL (ref 6–8.5)
SODIUM SERPL-SCNC: 138 MMOL/L (ref 136–145)
T4 FREE SERPL-MCNC: 1.75 NG/DL (ref 0.92–1.68)
TRIGL SERPL-MCNC: 40 MG/DL (ref 0–150)
TSH SERPL DL<=0.05 MIU/L-ACNC: 1.08 UIU/ML (ref 0.27–4.2)
VLDLC SERPL-MCNC: 10 MG/DL (ref 5–40)

## 2024-07-10 PROCEDURE — 80061 LIPID PANEL: CPT | Performed by: PHYSICIAN ASSISTANT

## 2024-07-10 PROCEDURE — 95251 CONT GLUC MNTR ANALYSIS I&R: CPT | Performed by: PHYSICIAN ASSISTANT

## 2024-07-10 PROCEDURE — 80053 COMPREHEN METABOLIC PANEL: CPT | Performed by: PHYSICIAN ASSISTANT

## 2024-07-10 PROCEDURE — 84439 ASSAY OF FREE THYROXINE: CPT | Performed by: PHYSICIAN ASSISTANT

## 2024-07-10 PROCEDURE — 36415 COLL VENOUS BLD VENIPUNCTURE: CPT | Performed by: PHYSICIAN ASSISTANT

## 2024-07-10 PROCEDURE — 3051F HG A1C>EQUAL 7.0%<8.0%: CPT | Performed by: PHYSICIAN ASSISTANT

## 2024-07-10 PROCEDURE — 99214 OFFICE O/P EST MOD 30 MIN: CPT | Performed by: PHYSICIAN ASSISTANT

## 2024-07-10 PROCEDURE — 1160F RVW MEDS BY RX/DR IN RCRD: CPT | Performed by: PHYSICIAN ASSISTANT

## 2024-07-10 PROCEDURE — 84443 ASSAY THYROID STIM HORMONE: CPT | Performed by: PHYSICIAN ASSISTANT

## 2024-07-10 PROCEDURE — 1159F MED LIST DOCD IN RCRD: CPT | Performed by: PHYSICIAN ASSISTANT

## 2024-07-10 PROCEDURE — 83036 HEMOGLOBIN GLYCOSYLATED A1C: CPT | Performed by: PHYSICIAN ASSISTANT

## 2024-07-10 RX ORDER — INSULIN LISPRO 100 [IU]/ML
INJECTION, SOLUTION INTRAVENOUS; SUBCUTANEOUS
Qty: 15 ML | Refills: 5 | Status: SHIPPED | OUTPATIENT
Start: 2024-07-10

## 2024-07-10 NOTE — PATIENT INSTRUCTIONS
Insulin dosing:  Basal insulin - Continue Lantus 10 units twice a day        Meal Insulin- Change R insulin to Humalog 5-6 units right before eating    To correct a high sugar use the Humalog with correction scale below          Correction insulin (every 4-5 hours for for high sugar):   0 unit  if  Blood sugar (BS)  less than 150   1 unit   if BS  150 - 199   2 units if  - 249   3 units if  - 299   4 units if  - 349   5 units if -399   6 units if BS >400

## 2024-07-10 NOTE — PROGRESS NOTES
"     Office Note      Date: 2024  Patient Name: Rafael Bocanegra  MRN: [id number]  : 1955    Chief Complaint   Patient presents with    Diabetes       History of Present Illness:   Rafael Bocanegra is a 69 y.o. male who presents for Diabetes type 1. Diagnosed in: .    Last appt: 24 with Dr. Britany Carter 10 U BID  R insulin 5 u after meals - sometimes takes up to 10 u for correction resulting in hypoglycemia  Eye exam is due  Foot exam due    Dexcom G7 reviewed (24-7/10/24): 44% time in range, frequent postprandial hyperglycemia with delayed hypoglycemia    Remains of levothyroxine 200 mcg daily for hypothyroidism. Takes the medication consistently.     Subjective      Diabetic Complications:  Eyes: No  Kidneys: No  Feet: No  Heart: No      Review of Systems:   Review of Systems   Constitutional: Negative.    Cardiovascular: Negative.    Gastrointestinal: Negative.    Endocrine: Negative.        The following portions of the patient's history were reviewed and updated as appropriate: allergies, current medications, past family history, past medical history, past social history, past surgical history, and problem list.    Objective     Visit Vitals  /80   Pulse 86   Ht 185.2 cm (72.91\")   Wt 73 kg (161 lb)   SpO2 98%   BMI 21.29 kg/m²       Physical Exam:  Physical Exam  Constitutional:       General: He is not in acute distress.     Appearance: He is well-developed. He is not diaphoretic.   Neck:      Thyroid: No thyromegaly.      Vascular: No JVD.      Trachea: No tracheal deviation.   Cardiovascular:      Rate and Rhythm: Normal rate and regular rhythm.      Pulses:           Dorsalis pedis pulses are 2+ on the right side and 2+ on the left side.        Posterior tibial pulses are 2+ on the right side and 2+ on the left side.   Pulmonary:      Effort: Pulmonary effort is normal.      Breath sounds: Normal breath sounds.   Musculoskeletal:         General: No tenderness.      Cervical " back: Neck supple.      Right foot: No deformity or Charcot foot.      Left foot: No deformity or Charcot foot.   Feet:      Right foot:      Protective Sensation: 5 sites tested.  5 sites sensed.      Skin integrity: No ulcer, blister, skin breakdown, erythema, warmth or callus.      Left foot:      Protective Sensation: 5 sites tested.  5 sites sensed.      Skin integrity: No ulcer, blister, skin breakdown, erythema, warmth or callus.      Comments: Diabetic Foot Exam Performed and Monofilament Test Performed      Skin:     General: Skin is warm and dry.      Findings: No erythema or rash.   Neurological:      Mental Status: He is alert and oriented to person, place, and time.   Psychiatric:         Behavior: Behavior normal.         Thought Content: Thought content normal.         Judgment: Judgment normal.         Labs:    HbA1c  Lab Results   Component Value Date    HGBA1C 7.8 (A) 07/10/2024       CMP  Lab Results   Component Value Date    GLUCOSE 142 (H) 10/24/2023    BUN 6 (L) 10/24/2023    CREATININE 0.69 (L) 10/24/2023    EGFRIFNONA 95 09/02/2021    EGFRIFAFRI 142 05/25/2021    BCR 8.7 10/24/2023    K 3.8 10/24/2023    CO2 28.5 10/24/2023    CALCIUM 9.1 10/24/2023    PROTENTOTREF 5.5 (L) 10/24/2023    LABIL2 3.2 10/24/2023    AST 11 10/24/2023    ALT 10 10/24/2023        Lipid Panel  Lab Results   Component Value Date    CHLPL 202 (H) 09/14/2023    HDL 82 (H) 09/14/2023     (H) 09/14/2023    TRIG 100 09/14/2023        TSH  Lab Results   Component Value Date    TSH 2.010 02/19/2024    FREET4 0.30 09/14/2023        Hemoglobin A1C  Lab Results   Component Value Date    HGBA1C 7.8 (A) 07/10/2024        Microalbumin/Creatinine  Lab Results   Component Value Date    MALBCRERATIO  09/02/2021      Comment:      Unable to calculate    MICROALBUR <1.2 09/02/2021           Assessment / Plan      Assessment & Plan:  Diagnoses and all orders for this visit:    1. Type 1 diabetes mellitus with hyperglycemia  (Primary)  -     POC Glycosylated Hemoglobin (Hb A1C)  -     POC Glucose, Blood  -     Microalbumin / Creatinine Urine Ratio - Urine, Clean Catch  -     Comprehensive Metabolic Panel  -     Lipid Panel  -     Insulin Lispro, 1 Unit Dial, (HumaLOG KwikPen) 100 UNIT/ML solution pen-injector; Change from R insulin, take 5-6 u before meals, correction 1:50 for >150, MDD 50 u  Dispense: 15 mL; Refill: 5    2. Acquired hypothyroidism  -     TSH  -     T4, Free      Current Outpatient Medications   Medication Instructions    albuterol sulfate  (90 Base) MCG/ACT inhaler 2 puffs, Inhalation, Every 4 Hours PRN    aspirin 81 mg, Oral, Daily    atorvastatin (LIPITOR) 10 mg, Oral, Nightly    bisacodyl (Dulcolax) 5 MG EC tablet Take 2 tablets at 3 pm and 2 tablets at 7 pm the day prior to colonoscopy    buPROPion SR (WELLBUTRIN SR) 150 mg, Oral, 2 Times Daily, Second dose should be taken between 5-6 pm    Continuous Glucose  (Dexcom G7 ) device 1 Units, Does not apply, Every 5 Years    Continuous Glucose Sensor (Dexcom G7 Sensor) misc 1 each, Does not apply, Every 10 Days    Glucagon (Gvoke HypoPen 2-Pack) 0.5 MG/0.1ML solution auto-injector 1 application , Subcutaneous, Daily PRN    Insulin Glargine (LANTUS SOLOSTAR) 10 Units, Subcutaneous, 2 Times Daily    Insulin Lispro, 1 Unit Dial, (HumaLOG KwikPen) 100 UNIT/ML solution pen-injector Change from R insulin, take 5-6 u before meals, correction 1:50 for >150, MDD 50 u    Insulin Syringe/Needle U-500 31G X 6MM 0.5 ML misc 1 Application, Does not apply, 3 Times Daily With Meals    levothyroxine (SYNTHROID, LEVOTHROID) 200 mcg, Oral, Daily    OneTouch Ultra test strip USE 1 STRIP TO CHECK GLUCOSE THREE TIMES DAILY    polyethylene glycol (MiraLax) 17 g packet Mix 238 gram powder with 64 oz of clear liquid starting at 5 pm. Drink 8 oz every 10-15 minutes until consumed.    triamcinolone (KENALOG) 0.5 % cream 1 Application, Topical, 2 Times Daily PRN    vitamin  D (ERGOCALCIFEROL) 50,000 Units, Oral, Weekly      A1C with some improvement - 7.8, down from 8.5 last visit  44% time in range  Postprandial hyperglycemia with delayed hypoglycemia  Encouraged pt to bolus before eating   Will try to change R insulin to Humalog 5-6 u before meals, CF 1:50 >150 - patient to let us know if Humalog is not covered   Continue Lantus 10 U BID  Written instructions reviewed with patient  Foot exam updated today  Check diabetes maintenance   Encouraged eye exam  Update TFTs and adjust T4 as needed    F.u 3-4 months with Dr. Garg    Return in about 4 months (around 11/10/2024).    Sara Patiño PA-C   07/10/2024

## 2024-07-10 NOTE — LETTER
July 10, 2024    Rafael Bocanegra  [address]  [facility] [address]      Insulin dosing:  Basal insulin - Continue Lantus 10 units twice a day        Meal Insulin- Change R insulin to Humalog 5-6 units right before eating    To correct a high sugar use the Humalog with correction scale below          Correction insulin (every 4-5 hours for for high sugar):   0 unit  if  Blood sugar (BS)  less than 150   1 unit   if BS  150 - 199   2 units if  - 249   3 units if  - 299   4 units if  - 349   5 units if -399   6 units if BS >400                              Sara Patiño PA-C

## 2024-07-12 ENCOUNTER — TELEPHONE (OUTPATIENT)
Dept: SURGERY | Facility: CLINIC | Age: 69
End: 2024-07-12
Payer: MEDICARE

## 2024-07-12 DIAGNOSIS — Z12.11 ENCOUNTER FOR SCREENING COLONOSCOPY: Primary | ICD-10-CM

## 2024-07-20 NOTE — OUTREACH NOTE
Patient Outreach    SW contacted pt. SW provided update regarding medicaid transport. Pt will call and attempt to schedule and let SW know if he has any problems.     Laura SALCEDO -   Ambulatory Case Management    1/15/2024, 11:24 EST     (3) no apparent problem

## 2024-07-26 ENCOUNTER — TELEPHONE (OUTPATIENT)
Dept: CASE MANAGEMENT | Facility: OTHER | Age: 69
End: 2024-07-26
Payer: MEDICARE

## 2024-07-26 DIAGNOSIS — I10 HYPERTENSION, UNSPECIFIED TYPE: ICD-10-CM

## 2024-07-26 DIAGNOSIS — E10.65 UNCONTROLLED TYPE 1 DIABETES MELLITUS WITH HYPERGLYCEMIA: Primary | ICD-10-CM

## 2024-07-26 NOTE — TELEPHONE ENCOUNTER
HIGINIO URRUTIA outreach with Patient; no answer and unable to leave a voicemail requesting a return call to JOHN Lopez with Chronic Care Management at [phone].

## 2024-08-07 ENCOUNTER — TELEPHONE (OUTPATIENT)
Dept: SURGERY | Facility: CLINIC | Age: 69
End: 2024-08-07
Payer: MEDICARE

## 2024-08-07 NOTE — TELEPHONE ENCOUNTER
S/W Rafael, He ask if the procedure can be changed to [facility]. Because of transportation & his sister not knowing her way around [place]. Rafael gave V/C to s/w Stephanie his sister.

## 2024-08-12 ENCOUNTER — PATIENT OUTREACH (OUTPATIENT)
Dept: CASE MANAGEMENT | Facility: OTHER | Age: 69
End: 2024-08-12
Payer: MEDICARE

## 2024-08-12 DIAGNOSIS — I10 HYPERTENSION, UNSPECIFIED TYPE: ICD-10-CM

## 2024-08-12 DIAGNOSIS — E10.65 UNCONTROLLED TYPE 1 DIABETES MELLITUS WITH HYPERGLYCEMIA: Primary | ICD-10-CM

## 2024-08-12 NOTE — OUTREACH NOTE
AMBULATORY CASE MANAGEMENT NOTE    Names and Relationships of Patient/Support Persons: Contact: Rafael Bocanegra; Relationship: Self -     CCM Interim Update    AMB CM outreach with Patient. Reviewed Endocrinology AVS from 07/10/24. Reinforced 5-6 units before meals and Basal of Lantus 10 units twice a day. He reports BG ranges from 40 to 400 over the past two weeks. He admits to being non adherent to diet and consuming a banana split prior to the BG of 400.    States that his CGM sensors have been falling off lately and he has been holding them in place with home made bandage. Educated on contacting [employer] for replacement sensors and provided contact information for [employer]. CM will also provide sensor samples at  for him/family to  tomorrow. Reinforced contacting CM, PCP, Endocrinology or [employer] for help maintaining sensors.     Most recent A1c of 7.8 which is improvement from 9.4 in the past year. Reports that he has all his medications, denies any other needs or concerns. He has an established support network, scheduled follow ups and refills on file with no new needs or concerns noted. Will transition Chronic Care Management to monitoring. CCM: Monitoring to graduate.    Education Documentation  Unresolved/Worsening Symptoms, taught by Jessica Infante, RN at 8/12/2024  1:49 PM.  Learner: Family, Patient  Readiness: Acceptance  Method: Explanation, Teach Back  Response: Verbalizes Understanding, Needs Reinforcement    Insulin Dose Matched to Carbohydrate Intake, taught by Jessica Infante, RN at 8/12/2024  1:49 PM.  Learner: Family, Patient  Readiness: Acceptance  Method: Explanation, Teach Back  Response: Verbalizes Understanding, Needs Reinforcement    Monitoring Carbohydrate Intake, taught by Jessica Infante, RN at 8/12/2024  1:49 PM.  Learner: Family, Patient  Readiness: Acceptance  Method: Explanation, Teach Back  Response: Verbalizes Understanding, Needs Reinforcement    Hypoglycemia  Identification and Treatment, taught by Jessica Infante, RN at 8/12/2024  1:49 PM.  Learner: Family, Patient  Readiness: Acceptance  Method: Explanation, Teach Back  Response: Verbalizes Understanding, Needs Reinforcement    Hyperglycemia Identification and Treatment, taught by Jessica Infante, RN at 8/12/2024  1:49 PM.  Learner: Family, Patient  Readiness: Acceptance  Method: Explanation, Teach Back  Response: Verbalizes Understanding, Needs Reinforcement    Insulin Therapy, taught by Jessica Inafnte, RN at 8/12/2024  1:49 PM.  Learner: Family, Patient  Readiness: Acceptance  Method: Explanation, Teach Back  Response: Verbalizes Understanding, Needs Reinforcement          Jessica BOYD  Ambulatory Case Management    8/12/2024, 13:49 EDT

## 2024-08-16 DIAGNOSIS — Z12.11 ENCOUNTER FOR SCREENING COLONOSCOPY: Primary | ICD-10-CM

## 2024-08-16 RX ORDER — SODIUM CHLORIDE 0.9 % (FLUSH) 0.9 %
10 SYRINGE (ML) INJECTION AS NEEDED
OUTPATIENT
Start: 2024-08-16

## 2024-08-16 RX ORDER — SODIUM CHLORIDE 0.9 % (FLUSH) 0.9 %
10 SYRINGE (ML) INJECTION EVERY 12 HOURS SCHEDULED
OUTPATIENT
Start: 2024-08-16

## 2024-08-16 RX ORDER — SODIUM CHLORIDE 9 MG/ML
40 INJECTION, SOLUTION INTRAVENOUS AS NEEDED
OUTPATIENT
Start: 2024-08-16

## 2024-08-16 RX ORDER — SODIUM CHLORIDE, SODIUM LACTATE, POTASSIUM CHLORIDE, CALCIUM CHLORIDE 600; 310; 30; 20 MG/100ML; MG/100ML; MG/100ML; MG/100ML
50 INJECTION, SOLUTION INTRAVENOUS CONTINUOUS
OUTPATIENT
Start: 2024-08-16

## 2024-08-20 NOTE — PRE-PROCEDURE INSTRUCTIONS
PAT phone history completed with patient for upcoming procedure on 8/21/24 with Dr. Gardner.    PAT PASS reviewed with patient and they verbalize understanding of the following:     Do not eat or drink anything after midnight the night before procedure unless otherwise instructed by physician/surgeon's office, this includes no gum, candy, mints, tobacco products or e-cigarettes.  Do not shave the area to be operated on at least 48 hours prior to procedure.  Do not wear makeup, lotion, hair products, or nail polish.  Do not wear any jewelry and remove all piercings.  Do not wear any adhesive if you wear dentures.  Do not wear contacts; bring in glasses if needed.  Only take medications on the morning of procedure as instructed by PAT nurse per anesthesia guidelines or as instructed by physician's office.  If you are on any blood thinners reach out to the physician/surgeon's office for instructions on when/if they will need to be stopped prior to procedure.  Bring in picture ID and insurance card, advanced directive copies if applicable, CPAP/BIPAP/Inhalers if indicated morning of procedure, leave any other valuables at home.  Ensure you have arranged for someone to drive you home the day of your procedure and someone to care for you at home afterwards. It is recommended that you do not drive, drink alcohol, or make any major legal decisions for at least 24 hours after your procedure is complete.    Instructions given on hospital entrance and registration location.

## 2024-08-21 ENCOUNTER — ANESTHESIA (OUTPATIENT)
Dept: GASTROENTEROLOGY | Facility: HOSPITAL | Age: 69
End: 2024-08-21
Payer: MEDICARE

## 2024-08-21 ENCOUNTER — HOSPITAL ENCOUNTER (OUTPATIENT)
Facility: HOSPITAL | Age: 69
Setting detail: HOSPITAL OUTPATIENT SURGERY
Discharge: HOME | End: 2024-08-21
Attending: STUDENT IN AN ORGANIZED HEALTH CARE EDUCATION/TRAINING PROGRAM | Admitting: STUDENT IN AN ORGANIZED HEALTH CARE EDUCATION/TRAINING PROGRAM
Payer: MEDICARE

## 2024-08-21 ENCOUNTER — ANESTHESIA EVENT (OUTPATIENT)
Dept: GASTROENTEROLOGY | Facility: HOSPITAL | Age: 69
End: 2024-08-21
Payer: MEDICARE

## 2024-08-21 VITALS
TEMPERATURE: 98.4 F | WEIGHT: 155 LBS | OXYGEN SATURATION: 93 % | BODY MASS INDEX: 24.33 KG/M2 | RESPIRATION RATE: 15 BRPM | SYSTOLIC BLOOD PRESSURE: 109 MMHG | DIASTOLIC BLOOD PRESSURE: 70 MMHG | HEIGHT: 67 IN | HEART RATE: 74 BPM

## 2024-08-21 DIAGNOSIS — Z12.11 ENCOUNTER FOR SCREENING COLONOSCOPY: ICD-10-CM

## 2024-08-21 LAB — GLUCOSE BLDC GLUCOMTR-MCNC: 225 MG/DL (ref 70–130)

## 2024-08-21 PROCEDURE — G0121 COLON CA SCRN NOT HI RSK IND: HCPCS | Performed by: STUDENT IN AN ORGANIZED HEALTH CARE EDUCATION/TRAINING PROGRAM

## 2024-08-21 PROCEDURE — S0260 H&P FOR SURGERY: HCPCS | Performed by: STUDENT IN AN ORGANIZED HEALTH CARE EDUCATION/TRAINING PROGRAM

## 2024-08-21 PROCEDURE — 25810000003 LACTATED RINGERS PER 1000 ML: Performed by: STUDENT IN AN ORGANIZED HEALTH CARE EDUCATION/TRAINING PROGRAM

## 2024-08-21 PROCEDURE — 25010000002 PROPOFOL 10 MG/ML EMULSION: Performed by: NURSE ANESTHETIST, CERTIFIED REGISTERED

## 2024-08-21 PROCEDURE — 82948 REAGENT STRIP/BLOOD GLUCOSE: CPT

## 2024-08-21 RX ORDER — SODIUM CHLORIDE 0.9 % (FLUSH) 0.9 %
10 SYRINGE (ML) INJECTION AS NEEDED
Status: DISCONTINUED | OUTPATIENT
Start: 2024-08-21 | End: 2024-08-21 | Stop reason: HOSPADM

## 2024-08-21 RX ORDER — SODIUM CHLORIDE, SODIUM LACTATE, POTASSIUM CHLORIDE, CALCIUM CHLORIDE 600; 310; 30; 20 MG/100ML; MG/100ML; MG/100ML; MG/100ML
50 INJECTION, SOLUTION INTRAVENOUS CONTINUOUS
Status: DISCONTINUED | OUTPATIENT
Start: 2024-08-21 | End: 2024-08-21 | Stop reason: HOSPADM

## 2024-08-21 RX ORDER — PROPOFOL 10 MG/ML
VIAL (ML) INTRAVENOUS AS NEEDED
Status: DISCONTINUED | OUTPATIENT
Start: 2024-08-21 | End: 2024-08-21 | Stop reason: SURG

## 2024-08-21 RX ORDER — SODIUM CHLORIDE 0.9 % (FLUSH) 0.9 %
10 SYRINGE (ML) INJECTION EVERY 12 HOURS SCHEDULED
Status: DISCONTINUED | OUTPATIENT
Start: 2024-08-21 | End: 2024-08-21 | Stop reason: HOSPADM

## 2024-08-21 RX ORDER — SODIUM CHLORIDE 9 MG/ML
40 INJECTION, SOLUTION INTRAVENOUS AS NEEDED
Status: DISCONTINUED | OUTPATIENT
Start: 2024-08-21 | End: 2024-08-21 | Stop reason: HOSPADM

## 2024-08-21 RX ORDER — ONDANSETRON 2 MG/ML
4 INJECTION INTRAMUSCULAR; INTRAVENOUS ONCE AS NEEDED
Status: DISCONTINUED | OUTPATIENT
Start: 2024-08-21 | End: 2024-08-21 | Stop reason: HOSPADM

## 2024-08-21 RX ADMIN — PROPOFOL 150 MG: 10 INJECTION, EMULSION INTRAVENOUS at 10:25

## 2024-08-21 RX ADMIN — LIDOCAINE HYDROCHLORIDE 100 MG: 20 INJECTION, SOLUTION INTRAVENOUS at 10:26

## 2024-08-21 RX ADMIN — SODIUM CHLORIDE, POTASSIUM CHLORIDE, SODIUM LACTATE AND CALCIUM CHLORIDE 50 ML/HR: 600; 310; 30; 20 INJECTION, SOLUTION INTRAVENOUS at 10:06

## 2024-08-21 NOTE — ANESTHESIA PREPROCEDURE EVALUATION
Anesthesia Evaluation     Patient summary reviewed and Nursing notes reviewed   no history of anesthetic complications:   NPO Solid Status: > 8 hours  NPO Liquid Status: > 8 hours           Airway   Mallampati: II  TM distance: >3 FB  Neck ROM: full  no difficulty expected and Possible difficult intubation  Dental - normal exam     Pulmonary - normal exam   (+) a smoker Current, Smoked day of surgery, cigarettes, COPD mild, asthma,  Cardiovascular - normal exam    (+) hyperlipidemia      Neuro/Psych  (+) seizures  GI/Hepatic/Renal/Endo    (+) diabetes mellitus type 1 using insulin, thyroid problem hypothyroidism    Musculoskeletal     Abdominal    Substance History - negative use     OB/GYN negative ob/gyn ROS         Other   arthritis,                   Anesthesia Plan    ASA 3     MAC     (Pt told that intravenous sedation will be used as the primary anesthetic along with local anesthesia if necessary. Every effort will be made to make sure the patient is comfortable.     The patient was told they may or may not have recall for the procedure. It was further explained that if the MAC was not adequate that a general anesthetic with either an LMA or endotracheal tube would be required.     Will proceed with the plan of care.)  intravenous induction     Anesthetic plan, risks, benefits, and alternatives have been provided, discussed and informed consent has been obtained with: patient.    CODE STATUS:

## 2024-08-21 NOTE — ANESTHESIA POSTPROCEDURE EVALUATION
Patient: Rafael Bocanegra    Procedure Summary       Date: 08/21/24 Room / Location: [facility] ENDOSCOPY 1 / [facility] ENDOSCOPY    Anesthesia Start: 1012 Anesthesia Stop: 1040    Procedure: COLONOSCOPY INCOMPLETE Diagnosis:       Encounter for screening colonoscopy      (Encounter for screening colonoscopy [Z12.11])    Surgeons: Nhi Gardner DO Provider: Scooby Campa CRNA    Anesthesia Type: MAC ASA Status: 3            Anesthesia Type: MAC    Vitals  Vitals Value Taken Time   /70 08/21/24 1110   Temp 98.4 °F (36.9 °C) 08/21/24 1044   Pulse 74 08/21/24 1110   Resp 15 08/21/24 1110   SpO2 93 % 08/21/24 1110           Post Anesthesia Care and Evaluation    Patient location during evaluation: PHASE II  Patient participation: complete - patient participated  Level of consciousness: awake  Pain score: 1  Pain management: adequate    Airway patency: patent  Anesthetic complications: No anesthetic complications  PONV Status: controlled  Cardiovascular status: acceptable and stable  Respiratory status: acceptable  Hydration status: acceptable    Comments: See Nursing record for post procedural Vital Signs as per protocol.

## 2024-08-21 NOTE — H&P
Reason for Consultation:  Screening colonoscopy    Chief complaint :  Screening colonoscopy    SUBJECTIVE:    History of present illness:  I did see the patient today as a consultation for evaluation and treatment of a need for screening colonoscopy.  He thinks he had a colonoscopy within the last 10 years. He denies remembering if there were polyps or anything abnormal. He has no complaints from a GI standpoint. His family history is negative for colon cancer.     Review of Systems:    Review of Systems - General ROS: negative for - chills, fatigue, fever, hot flashes, malaise or night sweats  Psychological ROS: negative for - behavioral disorder, disorientation, hallucinations, hostility or mood swings  ENT ROS: negative for - nasal polyps, oral lesions, sinus pain, sneezing or sore throat  Breast ROS: negative for - galactorrhea or new or changing breast lumps  Respiratory ROS: negative for - hemoptysis, orthopnea, pleuritic pain, sputum changes or stridor  Cardiovascular ROS: negative for - dyspnea on exertion, edema, irregular heartbeat, murmur, orthopnea, palpitations or rapid heart rate  Gastrointestinal ROS: negative for - change in stools, gas/bloating, hematemesis, melena or stool incontinence.  Genito-Urinary ROS: negative for - dysuria, genital ulcers, nocturia or pelvic pain  Musculoskeletal ROS: negative for - gait disturbance or muscle pain  Neurological ROS: negative for - dizziness, gait disturbance, memory loss, numbness/tingling or seizures      Allergies:  No Known Allergies    Medications:    Current Facility-Administered Medications:     lactated ringers infusion, 50 mL/hr, Intravenous, Continuous, Kendra, Nhi B, DO, Last Rate: 50 mL/hr at 08/21/24 1006, Restarted at 08/21/24 1012    Facility-Administered Medications Ordered in Other Encounters:     lidocaine (cardiac) (XYLOCAINE) injection, , Intravenous, PRN, Scooby Campa CRNA, 100 mg at 08/21/24 1026    History:  Past Medical  History:   Diagnosis Date    Arthritis     COPD (chronic obstructive pulmonary disease)     Diabetes mellitus     Diabetes mellitus type I     Disease of thyroid gland     H/O exercise stress test     Hyperlipidemia     Hyperthyroidism     Hypothyroidism     Seizure     diabetic       Past Surgical History:   Procedure Laterality Date    ARM DEBRIDEMENT      Broken glass removed from the right arm back around 1980s.       Family History   Problem Relation Age of Onset    Cancer Mother         Cancer in her lymph nodes.     Thyroid disease Sister        Social History     Tobacco Use    Smoking status: Every Day     Current packs/day: 2.00     Average packs/day: 2.0 packs/day for 47.0 years (94.0 ttl pk-yrs)     Types: Cigarettes    Smokeless tobacco: Never   Vaping Use    Vaping status: Never Used   Substance Use Topics    Alcohol use: Never    Drug use: Not Currently     Types: Cocaine(coke), Marijuana     Comment: stopped using in 1987          OBJECTIVE:    Vital Signs   Temp:  [97.4 °F (36.3 °C)] 97.4 °F (36.3 °C)  Heart Rate:  [80] 80  Resp:  [18] 18  BP: (137)/(67) 137/67    Physical Exam:     General Appearance:    Alert, cooperative, in no acute distress   Head:    Normocephalic, without obvious abnormality, atraumatic   Eyes:            Lids and lashes normal, conjunctivae and sclerae normal, no   icterus, no pallor, corneas clear, PERRLA   Throat:   No oral lesions, no thrush, oral mucosa moist   Neck:   No adenopathy, supple, trachea midline, no thyromegaly, no   carotid bruit, no JVD   Lungs:     Clear to auscultation,respirations regular, even and                  unlabored    Heart:    Regular rhythm and normal rate, normal S1 and S2, no            murmur, no gallop, no rub, no click   Chest Wall:    No abnormalities observed   Abdomen:     Normal bowel sounds, no masses, no organomegaly, soft        non-tender, non-distended, no guarding, there is no evidence of tenderness   Extremities:   Moves all  extremities well, no edema, no cyanosis, no             redness   Skin:   No bleeding, bruising or rash   Neurologic:   Cranial nerves 2 - 12 grossly intact, sensation intact, DTR       present and equal bilaterally           ASSESSMENT/PLAN:    Screening colonoscopy      I did have a detailed and extensive discussion with the patient in the the hospital today.  The full risks and benefits of operative versus nonoperative intervention were discussed with the paient, they understand, agree, and wish to proceed with the surgical treatment plan of colonoscopy.      Nhi Gardner, DO

## 2024-08-26 ENCOUNTER — TELEPHONE (OUTPATIENT)
Dept: SURGERY | Facility: CLINIC | Age: 69
End: 2024-08-26
Payer: MEDICARE

## 2024-08-28 ENCOUNTER — PATIENT OUTREACH (OUTPATIENT)
Dept: CASE MANAGEMENT | Facility: OTHER | Age: 69
End: 2024-08-28
Payer: MEDICARE

## 2024-08-28 DIAGNOSIS — I10 HYPERTENSION, UNSPECIFIED TYPE: ICD-10-CM

## 2024-08-28 DIAGNOSIS — E10.65 UNCONTROLLED TYPE 1 DIABETES MELLITUS WITH HYPERGLYCEMIA: Primary | ICD-10-CM

## 2024-08-28 NOTE — OUTREACH NOTE
[facility] End of Month Documentation    This Chronic Medical Management Care Plan for Rafael Bocanegra, 69 y.o. male, has been monitored and managed; reviewed and a new plan of care implemented for the month of August.  A cumulative time of 28  minutes was spent on this patient record this month, including phone call with patient; phone call with relative; chart review, Review of BG log 40 to 400, use of sensors and replacement sensors, new insulin orders..    Regarding the patient's problems: has Type 1 diabetes mellitus with hyperglycemia; Moderate persistent asthma without complication; Mixed hyperlipidemia; Acquired hypothyroidism; Vitamin D deficiency; Uncontrolled type 1 diabetes mellitus with hyperglycemia; Uncontrolled type 1 diabetes mellitus with hypoglycemia without coma; and Encounter for screening colonoscopy on their problem list., the following items were addressed: changes to medical care; medications, Sensor replacements for Dexcom, new insulin orders and transition to monitoring. and any changes can be found within the plan section of the note.  A detailed listing of time spent for chronic care management is tracked within each outreach encounter.  Current medications include:  has a current medication list which includes the following prescription(s): albuterol sulfate hfa, aspirin, atorvastatin, bupropion sr, dexcom g7 , dexcom g7 sensor, gvoke hypopen 2-pack, insulin glargine, insulin lispro (1 unit dial), insulin syringe/needle u-500, levothyroxine, onetouch ultra, triamcinolone, and vitamin d. and the patient is reported to be patient is noncompliant with medication protocol, Pt admits to being non adherent with dietary recommendations; reports that he has all recommended medications,  Medications are reported to be non-effective in controlling symptoms and changes have been made to the medication protocol, Current A1c 7.8 and improvement from 8.5 down from 11.4.  Regarding these diagnoses,  referrals were made to the following provider(s):  None.  All notes on chart for PCP to review.    The patient was monitored remotely for blood glucose; medications, Uses Dexcom G7 CGM.    The patient's physical needs include:  eye care; medication education; help taking medications as prescribed, May possibly need transportation for upcoming appt; provided information to schedule transportation.     The patient's mental support needs include:  needs met    The patient's cognitive support needs include:  needs met    The patient's psychosocial support needs include:  medication management or adherence    The patient's functional needs include: medication education; eye care, Appeal for PA on Dexcom G7    The patient's environmental needs include:  not applicable, Resides with sister and nephew, does not drive or have an auto in his name; needs help with transportation for medial appointments    Care Plan overall comments:  Replacement sensors for Dexcom G7, new insulin orders and reinforced prevention of hypoglycemia and extreme high BG readings.    Refer to previous outreach notes for more information on the areas listed above.    Monthly Billing Diagnoses  (E10.65) Uncontrolled type 1 diabetes mellitus with hyperglycemia    (I10) Hypertension, unspecified type    Medications   Medications have been reconciled    Care Plan progress this month:      Recently Modified Care Plans Updates made since 7/28/2024 12:00 AM       Diabetes Type 1 (Adult)           Problem Priority Last Modified     Disease Progression (Diabetes, Type 1) --  9/20/2023  3:09 PM by Jessica Infante RN              Goal Recent Progress Last Modified     Disease Progression Prevented or Minimized --  9/20/2023  3:09 PM by Jessica Infante, RN     Evidence-based guidance:   Prepare patient for laboratory and diagnostic exams based on risk and presentation.   Encourage lifestyle changes, such as increased intake of plant-based foods, stress  reduction, regular physical activity and smoking cessation to prevent long-term complications and chronic diseases.   Discourage sedentary behavior or sitting longer than 30 minutes without interruption; individualize exercise or activity recommendations with potential limitations, such as neuropathy, retinopathy or the ability to prevent    hyperglycemia or hypoglycemia.   Prepare patient for use of pharmacologic therapy that may include antihypertensive, analgesic, prostaglandin E1 with periodic adjustments based on presenting chronic condition and laboratory results.   Assess signs/symptoms and risk factors for hypertension, sleep-disordered breathing, neuropathy (including changes in gait and balance), retinopathy, nephropathy and sexual dysfunction.   Address pregnancy planning and contraceptive choice, especially when prescribing antihypertensive or statin.   Implement additional individualized goals and interventions based on identified risk factors.   Prepare patient for consultation or referral for specialist care, such as ophthalmology, neurology, cardiology, podiatry, nephrology or perinatology.    Notes:            Task Due Date Last Modified     Monitor and Manage Follow-up for Comorbidities --  8/12/2024  1:34 PM by Jessica Infante RN     Care Management Activities:      - healthy lifestyle promoted  - response to pharmacologic therapy monitored      Notes:   02/12/24: Encouraged to keep a food log for endocrinology appt.  03/26/24  04/10/24                         Current Specialty Plan of Care Status signed by both patient and provider    Instructions   Patient was provided an electronic copy of care plan  CCM services were explained and offered and patient has accepted these services.  Patient has given their written consent to receive CCM services and understands that this includes the authorization of electronic communication of medical information with the other treating providers.  Patient  understands that they may stop CCM services at any time and these changes will be effective at the end of the calendar month and will effectively revocate the agreement of CCM services.  Patient understands that only one practitioner can furnish and be paid for CCM services during one calendar month.  Patient also understands that there may be co-payment or deductible fees in association with CCM services.  Patient will continue with at least monthly follow-up calls with the Ambulatory .    Jessica BOYD  Ambulatory Case Management    8/28/2024, 17:17 EDT

## 2024-09-03 NOTE — TELEPHONE ENCOUNTER
Unable to reach patient. Tentatively scheduled him for a colonoscopy on 10/16, mailed instructions with date and extended prep.

## 2024-09-11 ENCOUNTER — PATIENT OUTREACH (OUTPATIENT)
Dept: CASE MANAGEMENT | Facility: OTHER | Age: 69
End: 2024-09-11
Payer: MEDICARE

## 2024-09-11 DIAGNOSIS — Z12.11 ENCOUNTER FOR SCREENING COLONOSCOPY: Primary | ICD-10-CM

## 2024-09-11 DIAGNOSIS — E10.65 UNCONTROLLED TYPE 1 DIABETES MELLITUS WITH HYPERGLYCEMIA: Primary | ICD-10-CM

## 2024-09-11 DIAGNOSIS — I10 HYPERTENSION, UNSPECIFIED TYPE: ICD-10-CM

## 2024-09-11 RX ORDER — POLYETHYLENE GLYCOL 3350 17 G/17G
POWDER, FOR SOLUTION ORAL
Qty: 238 G | Refills: 0 | Status: SHIPPED | OUTPATIENT
Start: 2024-09-11

## 2024-09-11 RX ORDER — SODIUM CHLORIDE 9 MG/ML
40 INJECTION, SOLUTION INTRAVENOUS AS NEEDED
OUTPATIENT
Start: 2024-09-11

## 2024-09-11 RX ORDER — SODIUM CHLORIDE 0.9 % (FLUSH) 0.9 %
10 SYRINGE (ML) INJECTION EVERY 12 HOURS SCHEDULED
OUTPATIENT
Start: 2024-09-11

## 2024-09-11 RX ORDER — SODIUM CHLORIDE, SODIUM LACTATE, POTASSIUM CHLORIDE, CALCIUM CHLORIDE 600; 310; 30; 20 MG/100ML; MG/100ML; MG/100ML; MG/100ML
50 INJECTION, SOLUTION INTRAVENOUS CONTINUOUS
OUTPATIENT
Start: 2024-09-11

## 2024-09-11 RX ORDER — BISACODYL 5 MG/1
TABLET, DELAYED RELEASE ORAL
Qty: 4 TABLET | Refills: 0 | Status: SHIPPED | OUTPATIENT
Start: 2024-09-11

## 2024-09-11 RX ORDER — SODIUM CHLORIDE 0.9 % (FLUSH) 0.9 %
10 SYRINGE (ML) INJECTION AS NEEDED
OUTPATIENT
Start: 2024-09-11

## 2024-09-11 NOTE — OUTREACH NOTE
AMBULATORY CASE MANAGEMENT NOTE    Names and Relationships of Patient/Support Persons: Contact: Rafael Bocanegra; Relationship:  -     Care Coordination    AMB CM completed chart review, day 30/90 and no urgent care or emergency department visits noted. Patient has appointment with primary and refills on file. Current conditions are stable. No changes or new needs identified. CCM; Monitoring to graduate.      Rosemary SALCEDO  Ambulatory Case Management    9/11/2024, 14:39 EDT

## 2024-10-11 ENCOUNTER — TELEPHONE (OUTPATIENT)
Dept: CASE MANAGEMENT | Facility: OTHER | Age: 69
End: 2024-10-11
Payer: MEDICARE

## 2024-10-11 DIAGNOSIS — E10.65 UNCONTROLLED TYPE 1 DIABETES MELLITUS WITH HYPERGLYCEMIA: Primary | ICD-10-CM

## 2024-10-11 DIAGNOSIS — I10 HYPERTENSION, UNSPECIFIED TYPE: ICD-10-CM

## 2024-10-25 ENCOUNTER — OFFICE VISIT (OUTPATIENT)
Dept: INTERNAL MEDICINE | Facility: CLINIC | Age: 69
End: 2024-10-25
Payer: MEDICARE

## 2024-10-25 VITALS
OXYGEN SATURATION: 98 % | WEIGHT: 171 LBS | DIASTOLIC BLOOD PRESSURE: 66 MMHG | TEMPERATURE: 98.7 F | SYSTOLIC BLOOD PRESSURE: 132 MMHG | BODY MASS INDEX: 26.84 KG/M2 | HEIGHT: 67 IN | HEART RATE: 87 BPM

## 2024-10-25 DIAGNOSIS — E10.65 TYPE 1 DIABETES MELLITUS WITH HYPERGLYCEMIA: ICD-10-CM

## 2024-10-25 DIAGNOSIS — J45.40 MODERATE PERSISTENT ASTHMA WITHOUT COMPLICATION: ICD-10-CM

## 2024-10-25 DIAGNOSIS — H61.22 IMPACTED CERUMEN OF LEFT EAR: ICD-10-CM

## 2024-10-25 DIAGNOSIS — G25.81 RLS (RESTLESS LEGS SYNDROME): ICD-10-CM

## 2024-10-25 DIAGNOSIS — Z13.6 SCREENING FOR AAA (ABDOMINAL AORTIC ANEURYSM): ICD-10-CM

## 2024-10-25 DIAGNOSIS — E03.9 ACQUIRED HYPOTHYROIDISM: ICD-10-CM

## 2024-10-25 DIAGNOSIS — F17.210 SMOKES 1 PACK OR MORE OF CIGARETTES PER DAY: ICD-10-CM

## 2024-10-25 DIAGNOSIS — Z00.00 ENCOUNTER FOR SUBSEQUENT ANNUAL WELLNESS VISIT (AWV) IN MEDICARE PATIENT: Primary | ICD-10-CM

## 2024-10-25 DIAGNOSIS — E55.9 VITAMIN D DEFICIENCY: ICD-10-CM

## 2024-10-25 DIAGNOSIS — H53.9 VISION CHANGES: ICD-10-CM

## 2024-10-25 DIAGNOSIS — E78.2 MIXED HYPERLIPIDEMIA: ICD-10-CM

## 2024-10-25 RX ORDER — ROPINIROLE 1 MG/1
1 TABLET, FILM COATED ORAL NIGHTLY
Qty: 90 TABLET | Refills: 1 | Status: SHIPPED | OUTPATIENT
Start: 2024-10-25

## 2024-10-25 NOTE — PROGRESS NOTES
" Subjective   The ABCs of the Annual Wellness Visit  Medicare Wellness Visit      Rafael Bocanegra is a 69 y.o. patient who presents for a Medicare Wellness Visit.    The following portions of the patient's history were reviewed and   updated as appropriate: {history reviewed:20406::\"allergies\",\"current medications\",\"past family history\",\"past medical history\",\"past social history\",\"past surgical history\",\"problem list\"}.    Compared to one year ago, the patient's physical   health is {better worse same:05088}.  Compared to one year ago, the patient's mental   health is {better worse same:42601}.    Recent Hospitalizations:  {Hospital Admission Status in the last 365 days:18112}    Current Medical Providers:  Patient Care Team:  Shirley Hutchison APRN as PCP - General (Family Medicine)  Je Garg MD as Consulting Physician (Endocrinology)  Jessica Infante RN as Ambulatory  (Population Health)    Outpatient Medications Prior to Visit   Medication Sig Dispense Refill    albuterol sulfate  (90 Base) MCG/ACT inhaler Inhale 2 puffs Every 4 (Four) Hours As Needed for Wheezing or Shortness of Air. 18 g 11    aspirin 81 MG EC tablet Take 1 tablet by mouth Daily. 90 tablet 1    atorvastatin (LIPITOR) 10 MG tablet Take 1 tablet by mouth Every Night. 90 tablet 1    bisacodyl 5 MG EC tablet Take 4 tablets at 1:00pm with 8oz of clear liquids the day before your colonoscopy. 4 tablet 0    buPROPion SR (Wellbutrin SR) 150 MG 12 hr tablet Take 1 tablet by mouth 2 (Two) Times a Day. Second dose should be taken between 5-6 pm 180 tablet 1    Continuous Glucose  (Dexcom G7 ) device Use 1 Units Every 5 (Five) Years. 1 each 0    Continuous Glucose Sensor (Dexcom G7 Sensor) misc Use 1 each Every 10 (Ten) Days. 9 each 3    Glucagon (Gvoke HypoPen 2-Pack) 0.5 MG/0.1ML solution auto-injector Inject 1 application  under the skin into the appropriate area as directed Daily As Needed (low " blood sugar). 0.2 mL 1    Insulin Glargine (LANTUS SOLOSTAR) 100 UNIT/ML injection pen Inject 10 Units under the skin into the appropriate area as directed 2 (Two) Times a Day. 18 mL 3    Insulin Lispro, 1 Unit Dial, (HumaLOG KwikPen) 100 UNIT/ML solution pen-injector Change from R insulin, take 5-6 u before meals, correction 1:50 for >150, MDD 50 u 15 mL 5    Insulin Syringe/Needle U-500 31G X 6MM 0.5 ML misc Use 1 Application 3 (Three) Times a Day With Meals. 270 each 3    levothyroxine (SYNTHROID, LEVOTHROID) 200 MCG tablet Take 1 tablet by mouth Daily. 90 tablet 1    OneTouch Ultra test strip USE 1 STRIP TO CHECK GLUCOSE THREE TIMES DAILY 300 each 3    polyethylene glycol (MIRALAX) 17 GM/SCOOP powder Mix 238g powder with 64 oz of clear liquid at 4:00pm. Drink 8 oz every 10-15 minutes until consumed. 238 g 0    triamcinolone (KENALOG) 0.5 % cream Apply 1 Application topically to the appropriate area as directed 2 (Two) Times a Day As Needed for Irritation. 454 g 0    vitamin D (ERGOCALCIFEROL) 1.25 MG (19302 UT) capsule capsule Take 1 capsule by mouth 1 (One) Time Per Week. 12 capsule 0     No facility-administered medications prior to visit.     No opioid medication identified on active medication list. I have reviewed chart for other potential  high risk medication/s and harmful drug interactions in the elderly.      Aspirin is on active medication list. {ASPIRIN ON MEDICATION LIST INDICATED/NOT INDICATED:50768}.      Patient Active Problem List   Diagnosis    Type 1 diabetes mellitus with hyperglycemia    Moderate persistent asthma without complication    Mixed hyperlipidemia    Acquired hypothyroidism    Vitamin D deficiency    Uncontrolled type 1 diabetes mellitus with hyperglycemia    Uncontrolled type 1 diabetes mellitus with hypoglycemia without coma    Encounter for screening colonoscopy     Advance Care Planning {Advance Care Planning Hyperlink:23}Advance Directive is not on file.  {ACP Discussion,  "Advance Directive not in EMR:14961}            Objective   Vitals:    10/25/24 0827   BP: 132/66   Pulse: 87   Temp: 98.7 °F (37.1 °C)   SpO2: 98%   Weight: 77.6 kg (171 lb)   Height: 170.2 cm (67.01\")   PainSc:   6       Estimated body mass index is 26.78 kg/m² as calculated from the following:    Height as of this encounter: 170.2 cm (67.01\").    Weight as of this encounter: 77.6 kg (171 lb).    {BMI is >= 25 and <30. (Overweight) The following options were offered after discussion;:[id number]}       Does the patient have evidence of cognitive impairment? {Yes/No:42722}                                                                                               Health  Risk Assessment    Smoking Status:  Social History     Tobacco Use   Smoking Status Every Day    Current packs/day: 2.00    Average packs/day: 2.0 packs/day for 47.0 years (94.0 ttl pk-yrs)    Types: Cigarettes   Smokeless Tobacco Never     Alcohol Consumption:  Social History     Substance and Sexual Activity   Alcohol Use Never       Fall Risk Screen{Jump to Steadi Fall Risk Flowsheet:23}  STEADI Fall Risk Assessment was completed, and patient is at MODERATE risk for falls. Assessment completed on:10/25/2024    Depression Screening:      10/25/2024     8:31 AM   PHQ-2/PHQ-9 Depression Screening   Little interest or pleasure in doing things Not at all   Feeling down, depressed, or hopeless Not at all     Health Habits and Functional and Cognitive Screening:      10/25/2024     8:32 AM   Functional & Cognitive Status   Do you have difficulty preparing food and eating? No   Do you have difficulty bathing yourself, getting dressed or grooming yourself? No   Do you have difficulty using the toilet? No   Do you have difficulty moving around from place to place? No   Do you have trouble with steps or getting out of a bed or a chair? No   Current Diet Well Balanced Diet   Dental Exam Up to date   Eye Exam Not up to date   Exercise (times per week) 7 " times per week   Current Exercises Include Walking   Do you need help using the phone?  No   Are you deaf or do you have serious difficulty hearing?  No   Do you need help to go to places out of walking distance? Yes   Do you need help shopping? No   Do you need help preparing meals?  No   Do you need help with housework?  No   Do you need help with laundry? No   Do you need help taking your medications? No   Do you need help managing money? No   Do you ever drive or ride in a car without wearing a seat belt? No   Have you felt unusual stress, anger or loneliness in the last month? No   Who do you live with? Sibling   If you need help, do you have trouble finding someone available to you? No   Have you been bothered in the last four weeks by sexual problems? No   Do you have difficulty concentrating, remembering or making decisions? No           Age-appropriate Screening Schedule:  Refer to the list below for future screening recommendations based on patient's age, sex and/or medical conditions. Orders for these recommended tests are listed in the plan section. The patient has been provided with a written plan.    Health Maintenance List  Health Maintenance   Topic Date Due    DIABETIC EYE EXAM  02/01/2021    AAA SCREEN (ONE-TIME)  Never done    URINE MICROALBUMIN  09/02/2022    INFLUENZA VACCINE  08/01/2024    ANNUAL WELLNESS VISIT  10/24/2024    HEMOGLOBIN A1C  01/10/2025    COVID-19 Vaccine (1 - 2023-24 season) 01/14/2025 (Originally 9/1/2024)    ZOSTER VACCINE (1 of 2) 04/24/2025 (Originally 1/14/2007)    TDAP/TD VACCINES (1 - Tdap) 10/25/2025 (Originally 1/14/1976)    LUNG CANCER SCREENING  04/08/2025    DIABETIC FOOT EXAM  07/10/2025    LIPID PANEL  07/10/2025    COLORECTAL CANCER SCREENING  08/21/2034    HEPATITIS C SCREENING  Completed    Pneumococcal Vaccine 65+  Completed                                                                                                                                                 CMS Preventative Services Quick Reference  Risk Factors Identified During Encounter  {Medicare Wellness Risk Factors:32172}    The above risks/problems have been discussed with the patient.  Pertinent information has been shared with the patient in the After Visit Summary.  An After Visit Summary and PPPS were made available to the patient.    Follow Up:{Wrapup  Review (Popup)  Advance Care Planning  Labs  CC  Problem List  Visit Diagnosis  Medications  Result Review  Imaging  Health Maintenance  Quality  BestPractice  SmartSets  SnapShot  Encounters  Notes  Media  Procedures :23}   Next Medicare Wellness visit to be scheduled in 1 year.     Assessment & Plan               Follow Up:{Wrapup  Review (Popup)  Advance Care Planning  Labs  CC  Problem List  Visit Diagnosis  Medications  Result Review  Imaging  Health Maintenance  Quality  BestPractice  SmartSets  SnapShot  Encounters  Notes  Media  Procedures :23}   No follow-ups on file.

## 2024-10-25 NOTE — PROGRESS NOTES
Subjective   The ABCs of the Annual Wellness Visit  Medicare Wellness Visit      Rafael Bocanegra is a 69 y.o. patient who presents for a Medicare Wellness Visit.    The following portions of the patient's history were reviewed and   updated as appropriate: allergies, current medications, past family history, past medical history, past social history, past surgical history, and problem list.    Compared to one year ago, the patient's physical   health is better.  Compared to one year ago, the patient's mental   health is better.    Recent Hospitalizations:  He was not admitted to the hospital during the last year.     Current Medical Providers:  Patient Care Team:  Shirley Hutchison APRN as PCP - General (Family Medicine)  Je Garg MD as Consulting Physician (Endocrinology)  Jessica Infante RN as Ambulatory  ([facility])    Outpatient Medications Prior to Visit   Medication Sig Dispense Refill    albuterol sulfate  (90 Base) MCG/ACT inhaler Inhale 2 puffs Every 4 (Four) Hours As Needed for Wheezing or Shortness of Air. 18 g 11    aspirin 81 MG EC tablet Take 1 tablet by mouth Daily. 90 tablet 1    atorvastatin (LIPITOR) 10 MG tablet Take 1 tablet by mouth Every Night. 90 tablet 1    bisacodyl 5 MG EC tablet Take 4 tablets at 1:00pm with 8oz of clear liquids the day before your colonoscopy. 4 tablet 0    buPROPion SR (Wellbutrin SR) 150 MG 12 hr tablet Take 1 tablet by mouth 2 (Two) Times a Day. Second dose should be taken between 5-6 pm 180 tablet 1    Continuous Glucose  (Dexcom G7 ) device Use 1 Units Every 5 (Five) Years. 1 each 0    Continuous Glucose Sensor (Dexcom G7 Sensor) misc Use 1 each Every 10 (Ten) Days. 9 each 3    Glucagon (Gvoke HypoPen 2-Pack) 0.5 MG/0.1ML solution auto-injector Inject 1 application  under the skin into the appropriate area as directed Daily As Needed (low blood sugar). 0.2 mL 1    Insulin Glargine (LANTUS SOLOSTAR) 100  UNIT/ML injection pen Inject 10 Units under the skin into the appropriate area as directed 2 (Two) Times a Day. 18 mL 3    Insulin Lispro, 1 Unit Dial, (HumaLOG KwikPen) 100 UNIT/ML solution pen-injector Change from R insulin, take 5-6 u before meals, correction 1:50 for >150, MDD 50 u 15 mL 5    Insulin Syringe/Needle U-500 31G X 6MM 0.5 ML misc Use 1 Application 3 (Three) Times a Day With Meals. 270 each 3    levothyroxine (SYNTHROID, LEVOTHROID) 200 MCG tablet Take 1 tablet by mouth Daily. 90 tablet 1    OneTouch Ultra test strip USE 1 STRIP TO CHECK GLUCOSE THREE TIMES DAILY 300 each 3    polyethylene glycol (MIRALAX) 17 GM/SCOOP powder Mix 238g powder with 64 oz of clear liquid at 4:00pm. Drink 8 oz every 10-15 minutes until consumed. 238 g 0    triamcinolone (KENALOG) 0.5 % cream Apply 1 Application topically to the appropriate area as directed 2 (Two) Times a Day As Needed for Irritation. 454 g 0    vitamin D (ERGOCALCIFEROL) 1.25 MG (77142 UT) capsule capsule Take 1 capsule by mouth 1 (One) Time Per Week. 12 capsule 0     No facility-administered medications prior to visit.     No opioid medication identified on active medication list. I have reviewed chart for other potential  high risk medication/s and harmful drug interactions in the elderly.      Aspirin is on active medication list. Aspirin use is indicated based on review of current medical condition/s. Pros and cons of this therapy have been discussed today. Benefits of this medication outweigh potential harm.  Patient has been encouraged to continue taking this medication.  .      Patient Active Problem List   Diagnosis    Type 1 diabetes mellitus with hyperglycemia    Moderate persistent asthma without complication    Mixed hyperlipidemia    Acquired hypothyroidism    Vitamin D deficiency    Uncontrolled type 1 diabetes mellitus with hyperglycemia    Uncontrolled type 1 diabetes mellitus with hypoglycemia without coma    Encounter for screening  "colonoscopy     Advance Care Planning Advance Directive is not on file.  ACP discussion was held with the patient during this visit. Patient does not have an advance directive, declines further assistance.            Objective   Vitals:    10/25/24 0827   BP: 132/66   Pulse: 87   Temp: 98.7 °F (37.1 °C)   SpO2: 98%   Weight: 77.6 kg (171 lb)   Height: 170.2 cm (67.01\")   PainSc:   6       Estimated body mass index is 26.78 kg/m² as calculated from the following:    Height as of this encounter: 170.2 cm (67.01\").    Weight as of this encounter: 77.6 kg (171 lb).    BMI is >= 25 and <30. (Overweight) The following options were offered after discussion;: exercise counseling/recommendations and nutrition counseling/recommendations       Does the patient have evidence of cognitive impairment? No     Ear Cerumen Removal    Date/Time: 10/25/2024 10:04 AM    Performed by: Shirley Hutchison APRN  Authorized by: Shirley Hutchison APRN  Consent: Verbal consent obtained. Written consent not obtained.  Risks and benefits: risks, benefits and alternatives were discussed  Consent given by: patient  Patient understanding: patient states understanding of the procedure being performed  Required items: required blood products, implants, devices, and special equipment available  Patient identity confirmed: verbally with patient  Time out: Immediately prior to procedure a \"time out\" was called to verify the correct patient, procedure, equipment, support staff and site/side marked as required.    Anesthesia:  Local Anesthetic: none  Location details: left ear  Patient tolerance: patient tolerated the procedure well with no immediate complications  Procedure type: instrumentation, irrigation   Sedation:  Patient sedated: no                                                                                                  Health  Risk Assessment    Smoking Status:  Social History     Tobacco Use   Smoking Status Every Day    " Current packs/day: 2.00    Average packs/day: 2.0 packs/day for 47.0 years (94.0 ttl pk-yrs)    Types: Cigarettes   Smokeless Tobacco Never     Alcohol Consumption:  Social History     Substance and Sexual Activity   Alcohol Use Never       Fall Risk Screen  STEADI Fall Risk Assessment was completed, and patient is at MODERATE risk for falls. Assessment completed on:10/25/2024    Depression Screening:      10/25/2024     8:31 AM   PHQ-2/PHQ-9 Depression Screening   Little interest or pleasure in doing things Not at all   Feeling down, depressed, or hopeless Not at all     Health Habits and Functional and Cognitive Screening:      10/25/2024     8:32 AM   Functional & Cognitive Status   Do you have difficulty preparing food and eating? No   Do you have difficulty bathing yourself, getting dressed or grooming yourself? No   Do you have difficulty using the toilet? No   Do you have difficulty moving around from place to place? No   Do you have trouble with steps or getting out of a bed or a chair? No   Current Diet Well Balanced Diet   Dental Exam Up to date   Eye Exam Not up to date   Exercise (times per week) 7 times per week   Current Exercises Include Walking   Do you need help using the phone?  No   Are you deaf or do you have serious difficulty hearing?  No   Do you need help to go to places out of walking distance? Yes   Do you need help shopping? No   Do you need help preparing meals?  No   Do you need help with housework?  No   Do you need help with laundry? No   Do you need help taking your medications? No   Do you need help managing money? No   Do you ever drive or ride in a car without wearing a seat belt? No   Have you felt unusual stress, anger or loneliness in the last month? No   Who do you live with? Sibling   If you need help, do you have trouble finding someone available to you? No   Have you been bothered in the last four weeks by sexual problems? No   Do you have difficulty concentrating,  remembering or making decisions? No           Age-appropriate Screening Schedule:  Refer to the list below for future screening recommendations based on patient's age, sex and/or medical conditions. Orders for these recommended tests are listed in the plan section. The patient has been provided with a written plan.    Health Maintenance List  Health Maintenance   Topic Date Due    DIABETIC EYE EXAM  02/01/2021    AAA SCREEN (ONE-TIME)  Never done    URINE MICROALBUMIN  09/02/2022    BMI FOLLOWUP  08/24/2023    INFLUENZA VACCINE  08/01/2024    HEMOGLOBIN A1C  01/10/2025    COVID-19 Vaccine (1 - 2023-24 season) 01/14/2025 (Originally 9/1/2024)    ZOSTER VACCINE (1 of 2) 04/24/2025 (Originally 1/14/2007)    TDAP/TD VACCINES (1 - Tdap) 10/25/2025 (Originally 1/14/1976)    LUNG CANCER SCREENING  04/08/2025    DIABETIC FOOT EXAM  07/10/2025    LIPID PANEL  07/10/2025    ANNUAL WELLNESS VISIT  10/25/2025    COLORECTAL CANCER SCREENING  08/21/2034    HEPATITIS C SCREENING  Completed    Pneumococcal Vaccine 65+  Completed                                                                                                                                                CMS Preventative Services Quick Reference  Risk Factors Identified During Encounter  Immunizations Discussed/Encouraged: Influenza, COVID19, and RSV (Respiratory Syncytial Virus)  Inadequate Social Support, Isolation, Loneliness, Lack of Transportation, Financial Difficulties, or Caregiver Stress: working with chronic care  due to financial concerns  Inactivity/Sedentary: Patient was advised to exercise at least 150 minutes a week per CDC recommendations.  Polypharmacy: Medication List reviewed and Medications are appropriate for patient  Tobacco Use/Dependance Risk: Rafael Bocanegra  reports that he has been smoking cigarettes. He has a 94 pack-year smoking history. He has never used smokeless tobacco. I have educated him on the risk of diseases from  "using tobacco products such as cancer, COPD, heart disease, cataracts, and arterial disease.   I advised him to quit and he is not willing to quit but continues to try and cut back on cigarettes smoked per day.  I spent 4 minutes counseling the patient.   Vision Screening Recommended.    The above risks/problems have been discussed with the patient.  Pertinent information has been shared with the patient in the After Visit Summary.  An After Visit Summary and PPPS were made available to the patient.    Follow Up:   Next Medicare Wellness visit to be scheduled in 1 year.         Additional E&M Note during same encounter follows:  Patient has additional, significant, and separately identifiable condition(s)/problem(s) that require work above and beyond the Medicare Wellness Visit     Chief Complaint  Medicare Wellness-subsequent    Subjective   HPI  Rafael is also being seen today for additional medical problem/s.    T1DM, hypothyroidism.  Follows Endocrinology.  Wearing CGM, helps him manage diabetes much better than he was prior to using it.  Taking medications and insulins as prescribed.  Very rarely has hypoglycemia.  Has gained weight.  Healthy appetite. Patient denies foot ulcerations, nausea, polydipsia, polyuria, polyphagia, temperature intolerance, hair/skin/nail changes, bowel habit changes, palpitations, anxiety, sore throat, hoarseness.     Restless legs at night for years.  His sister and a brother suffer from RLS. Requip works well for sister.  He is requesting to try it.  Leg movements keep him from going to sleep and staying asleep.  Does not believe he snores.  No known FH of SHANTHI.        Smoking 1-1.5 ppd.  Was smoking 2 ppd for several years.  Taking bupropion as prescribed. Feels it has certainly helped him cut back on smoking.      Does not recall his last eye exam.  Has noted some transitory vision changes. Not associated with hypoglycemia, hyperglycemia.       He says he \"wants to pitch forward\" " "at times, particularly when changing direction quickly or when standing up from sitting.  No difficulty hearing, earache, ringing in ear(s), nasal congestion, chest pain, SOA, palpitations, change in vision, confusion, slurred speech associated with balance issues.      Moderate asthma.  Does not use daily inhaler, does not feel it is necessary.  Uses albuterol inhaler 3-4 x a month, works well when needed.        Objective   Vital Signs:  /66   Pulse 87   Temp 98.7 °F (37.1 °C)   Ht 170.2 cm (67.01\")   Wt 77.6 kg (171 lb)   SpO2 98%   BMI 26.78 kg/m²   Physical Exam  Constitutional:       Appearance: He is well-developed. He is not ill-appearing.   HENT:      Head: Normocephalic.      Right Ear: Tympanic membrane, ear canal and external ear normal.      Left Ear: Tympanic membrane and external ear normal.      Ears:      Comments: Left ear canal obstructed with impacted, dark dry cerumen.  Able to see TM clearly after irrigation.       Nose: Nose normal.      Mouth/Throat:      Mouth: Mucous membranes are moist.      Pharynx: Oropharynx is clear. Uvula midline.   Eyes:      Extraocular Movements: Extraocular movements intact.      Conjunctiva/sclera: Conjunctivae normal.      Pupils: Pupils are equal, round, and reactive to light.   Neck:      Thyroid: No thyromegaly.      Vascular: No carotid bruit.   Cardiovascular:      Rate and Rhythm: Normal rate and regular rhythm.      Pulses:           Radial pulses are 2+ on the right side and 2+ on the left side.        Dorsalis pedis pulses are 2+ on the right side and 2+ on the left side.      Heart sounds: Normal heart sounds.   Pulmonary:      Effort: Pulmonary effort is normal.      Breath sounds: Normal breath sounds.   Abdominal:      General: Bowel sounds are normal.      Palpations: Abdomen is soft.      Tenderness: There is no abdominal tenderness.   Musculoskeletal:         General: No tenderness or deformity. Normal range of motion.      Cervical " back: Full passive range of motion without pain, normal range of motion and neck supple.      Right lower leg: No edema.      Left lower leg: No edema.   Lymphadenopathy:      Cervical: No cervical adenopathy.   Skin:     General: Skin is warm.      Capillary Refill: Capillary refill takes less than 2 seconds.   Neurological:      Mental Status: He is alert and oriented to person, place, and time.      Sensory: No sensory deficit.      Coordination: Coordination normal.      Gait: Gait normal.      Comments: CN II-XII normal   Psychiatric:         Attention and Perception: Attention normal.         Mood and Affect: Mood and affect normal.         Speech: Speech normal.         Behavior: Behavior normal.         Thought Content: Thought content normal.                 Assessment and Plan               Encounter for subsequent annual wellness visit (AWV) in Medicare patient    RLS (restless legs syndrome)    Mixed hyperlipidemia     Type 1 diabetes mellitus with hyperglycemia    Acquired hypothyroidism    Moderate persistent asthma without complication          Vitamin D deficiency    BMI 26.0-26.9,adult    Vision changes    No orders of the defined types were placed in this encounter.            Follow Up   No follow-ups on file.  Patient was given instructions and counseling regarding his condition or for health maintenance advice. Please see specific information pulled into the AVS if appropriate.

## 2024-11-11 ENCOUNTER — PATIENT OUTREACH (OUTPATIENT)
Dept: CASE MANAGEMENT | Facility: OTHER | Age: 69
End: 2024-11-11
Payer: MEDICARE

## 2024-11-11 ENCOUNTER — TELEPHONE (OUTPATIENT)
Dept: INTERNAL MEDICINE | Facility: CLINIC | Age: 69
End: 2024-11-11
Payer: MEDICARE

## 2024-11-11 DIAGNOSIS — G25.81 RLS (RESTLESS LEGS SYNDROME): ICD-10-CM

## 2024-11-11 DIAGNOSIS — I10 HYPERTENSION, UNSPECIFIED TYPE: ICD-10-CM

## 2024-11-11 DIAGNOSIS — E10.65 UNCONTROLLED TYPE 1 DIABETES MELLITUS WITH HYPERGLYCEMIA: Primary | ICD-10-CM

## 2024-11-11 RX ORDER — ROPINIROLE 2 MG/1
2 TABLET, FILM COATED ORAL NIGHTLY
Qty: 90 TABLET | Refills: 1 | Status: SHIPPED | OUTPATIENT
Start: 2024-11-11

## 2024-11-11 NOTE — TELEPHONE ENCOUNTER
Increasing dose to 2 mg an hour before bedtime.  Can take 2 of the 1 mg tablets he has now until rx picked up.  If dose needs to be increased, please advise to contact clinic again to discuss.

## 2024-11-11 NOTE — TELEPHONE ENCOUNTER
Caller: Rafael Bocanegra    Relationship: Self    Best call back number: [phone]     What is the best time to reach you: ANYTIME, OK TO LEAVE VOICEMAIL.    Who are you requesting to speak with (clinical staff, provider,  specific staff member): CLINICAL STAFF    Do you know the name of the person who called: PATIENT    What was the call regarding: PATIENT IS CALLING TO ASK IF HE CAN HAVE THE MEDICATION DOSAGE OF ROPINIROLE 1 MG INCREASED. PLEASE CALL BACK TO DISCUSS.    Is it okay if the provider responds through MyChart: NO CALL ONLY

## 2024-11-11 NOTE — TELEPHONE ENCOUNTER
Patient notified of new instructions. Advised to call back if increase is needed again. Patient states understanding.

## 2024-11-11 NOTE — TELEPHONE ENCOUNTER
Spoke with patient, states that the medication is not lasting the full night. Only works for about 2-3 hours. Please advise.

## 2024-11-14 ENCOUNTER — HOSPITAL ENCOUNTER (OUTPATIENT)
Dept: ULTRASOUND IMAGING | Facility: HOSPITAL | Age: 69
Discharge: HOME | End: 2024-11-14
Admitting: NURSE PRACTITIONER
Payer: MEDICARE

## 2024-11-14 DIAGNOSIS — Z13.6 SCREENING FOR AAA (ABDOMINAL AORTIC ANEURYSM): ICD-10-CM

## 2024-11-14 DIAGNOSIS — F17.210 SMOKES 1 PACK OR MORE OF CIGARETTES PER DAY: ICD-10-CM

## 2024-11-14 PROCEDURE — 76706 US ABDL AORTA SCREEN AAA: CPT

## 2024-11-15 ENCOUNTER — TELEPHONE (OUTPATIENT)
Dept: INTERNAL MEDICINE | Facility: CLINIC | Age: 69
End: 2024-11-15
Payer: MEDICARE

## 2024-12-13 ENCOUNTER — OFFICE VISIT (OUTPATIENT)
Dept: INTERNAL MEDICINE | Facility: CLINIC | Age: 69
End: 2024-12-13
Payer: MEDICARE

## 2024-12-13 VITALS
SYSTOLIC BLOOD PRESSURE: 130 MMHG | DIASTOLIC BLOOD PRESSURE: 82 MMHG | WEIGHT: 163 LBS | TEMPERATURE: 97 F | HEIGHT: 67 IN | RESPIRATION RATE: 18 BRPM | BODY MASS INDEX: 25.58 KG/M2 | OXYGEN SATURATION: 97 % | HEART RATE: 85 BPM

## 2024-12-13 DIAGNOSIS — R26.89 SHUFFLING GAIT: ICD-10-CM

## 2024-12-13 DIAGNOSIS — R41.3 MEMORY IMPAIRMENT: Primary | ICD-10-CM

## 2024-12-13 DIAGNOSIS — R74.8 ELEVATED LIVER ENZYMES: ICD-10-CM

## 2024-12-13 DIAGNOSIS — R47.9 SPEECH DISTURBANCE, UNSPECIFIED TYPE: ICD-10-CM

## 2024-12-13 PROCEDURE — 1159F MED LIST DOCD IN RCRD: CPT | Performed by: PHYSICIAN ASSISTANT

## 2024-12-13 PROCEDURE — 1160F RVW MEDS BY RX/DR IN RCRD: CPT | Performed by: PHYSICIAN ASSISTANT

## 2024-12-13 PROCEDURE — 3051F HG A1C>EQUAL 7.0%<8.0%: CPT | Performed by: PHYSICIAN ASSISTANT

## 2024-12-13 PROCEDURE — 1125F AMNT PAIN NOTED PAIN PRSNT: CPT | Performed by: PHYSICIAN ASSISTANT

## 2024-12-13 PROCEDURE — 99214 OFFICE O/P EST MOD 30 MIN: CPT | Performed by: PHYSICIAN ASSISTANT

## 2024-12-13 NOTE — PROGRESS NOTES
Office Visit      Patient Name: Rafael Bocanegra  : 1955   MRN: [id number]     Chief Complaint:    Chief Complaint   Patient presents with    Extremity Weakness     Legs x2-3 weeks     Memory Loss     Family is concerned about him being forgetful and sleeping more, and his speech has been slower for the past 2 months        History of Present Illness: Rafael Bocanegra is a 69 y.o. male who is here today accompanied by his sister to discuss weakness and memory loss. This patient is new to me today.    He reports feeling of weakness in his legs for the last 2-3 weeks. Weak feeling starts after he has been walking a little bit. The lower legs also hurt most of the time when he is standing or walking. No leg swelling. He has stumbled a few times causing falls. He has some low back pain every once in a while. His sister describes his gait as shuffling. His family has been concerned with his forgetfulness, fatigue and slowed speech for the last 2 months. He is sleeping a lot but continues to be tired. He has had bags under his eyes for the last couple of months. He has been getting a little confused at times. No chest pain, SOA or headaches. No appetite change. No alcohol use in 40 years. Glucose average 177 for the last 30 days.    Strong family history of alzheimer dementia. Smoker.    Normal CHARLEY bilaterally 24. Chest CT negative 24. Negative AAA screening 24.    Started requip around 6 weeks ago which helps RLS. Sleeping well now.        Subjective      I have reviewed and the following portions of the patient's history were updated as appropriate: past family history, past medical history, past social history, past surgical history and problem list.      Current Outpatient Medications:     albuterol sulfate  (90 Base) MCG/ACT inhaler, Inhale 2 puffs Every 4 (Four) Hours As Needed for Wheezing or Shortness of Air., Disp: 18 g, Rfl: 11    aspirin 81 MG EC tablet, Take 1 tablet by mouth  Daily., Disp: 90 tablet, Rfl: 1    atorvastatin (LIPITOR) 10 MG tablet, Take 1 tablet by mouth Every Night., Disp: 90 tablet, Rfl: 1    buPROPion SR (Wellbutrin SR) 150 MG 12 hr tablet, Take 1 tablet by mouth 2 (Two) Times a Day. Second dose should be taken between 5-6 pm, Disp: 180 tablet, Rfl: 1    Continuous Glucose  (Dexcom G7 ) device, Use 1 Units Every 5 (Five) Years., Disp: 1 each, Rfl: 0    Continuous Glucose Sensor (Dexcom G7 Sensor) misc, Use 1 each Every 10 (Ten) Days., Disp: 9 each, Rfl: 3    Glucagon (Gvoke HypoPen 2-Pack) 0.5 MG/0.1ML solution auto-injector, Inject 1 application  under the skin into the appropriate area as directed Daily As Needed (low blood sugar)., Disp: 0.2 mL, Rfl: 1    Insulin Glargine (LANTUS SOLOSTAR) 100 UNIT/ML injection pen, Inject 10 Units under the skin into the appropriate area as directed 2 (Two) Times a Day., Disp: 18 mL, Rfl: 3    Insulin Lispro, 1 Unit Dial, (HumaLOG KwikPen) 100 UNIT/ML solution pen-injector, Change from R insulin, take 5-6 u before meals, correction 1:50 for >150, MDD 50 u, Disp: 15 mL, Rfl: 5    Insulin Syringe/Needle U-500 31G X 6MM 0.5 ML misc, Use 1 Application 3 (Three) Times a Day With Meals., Disp: 270 each, Rfl: 3    levothyroxine (SYNTHROID, LEVOTHROID) 200 MCG tablet, Take 1 tablet by mouth Daily., Disp: 90 tablet, Rfl: 1    OneTouch Ultra test strip, USE 1 STRIP TO CHECK GLUCOSE THREE TIMES DAILY, Disp: 300 each, Rfl: 3    rOPINIRole (REQUIP) 2 MG tablet, Take 1 tablet by mouth Every Night. Take 1 hour before bedtime., Disp: 90 tablet, Rfl: 1    vitamin D (ERGOCALCIFEROL) 1.25 MG (74069 UT) capsule capsule, Take 1 capsule by mouth 1 (One) Time Per Week., Disp: 12 capsule, Rfl: 0    bisacodyl 5 MG EC tablet, Take 4 tablets at 1:00pm with 8oz of clear liquids the day before your colonoscopy., Disp: 4 tablet, Rfl: 0    polyethylene glycol (MIRALAX) 17 GM/SCOOP powder, Mix 238g powder with 64 oz of clear liquid at 4:00pm. Drink  "8 oz every 10-15 minutes until consumed., Disp: 238 g, Rfl: 0    triamcinolone (KENALOG) 0.5 % cream, Apply 1 Application topically to the appropriate area as directed 2 (Two) Times a Day As Needed for Irritation., Disp: 454 g, Rfl: 0    No Known Allergies    Objective     Vital Signs:   Vitals:    12/13/24 1536 12/13/24 1608   BP: 142/82 130/82   Pulse: 85    Resp: 18    Temp: 97 °F (36.1 °C)    SpO2: 97%    Weight: 73.9 kg (163 lb)    Height: 170.2 cm (67\")      Body mass index is 25.53 kg/m².    Physical Exam  Vitals and nursing note reviewed.   Constitutional:       General: He is not in acute distress.     Appearance: He is well-developed. He is not ill-appearing, toxic-appearing or diaphoretic.   HENT:      Head: Normocephalic and atraumatic.   Eyes:      General: Lids are normal. Gaze aligned appropriately. No scleral icterus.     Extraocular Movements: Extraocular movements intact.      Conjunctiva/sclera: Conjunctivae normal.      Pupils: Pupils are equal, round, and reactive to light.   Cardiovascular:      Rate and Rhythm: Normal rate and regular rhythm.      Heart sounds: Normal heart sounds. No murmur heard.     No friction rub. No gallop.   Pulmonary:      Effort: Pulmonary effort is normal. No respiratory distress.      Breath sounds: Normal breath sounds. No wheezing, rhonchi or rales.   Chest:      Chest wall: No tenderness.   Abdominal:      General: Bowel sounds are normal.      Palpations: Abdomen is soft.      Tenderness: There is no abdominal tenderness.   Musculoskeletal:         General: No tenderness or deformity. Normal range of motion.      Cervical back: Normal range of motion and neck supple.      Right lower leg: No edema.      Left lower leg: No edema.   Skin:     General: Skin is warm and dry.      Capillary Refill: Capillary refill takes less than 2 seconds.      Findings: No rash.   Neurological:      Mental Status: He is alert and oriented to person, place, and time.      Cranial " Nerves: No cranial nerve deficit or facial asymmetry.      Sensory: Sensation is intact. No sensory deficit.      Motor: Weakness (mild, bilateral legs, symmetrical) present. No abnormal muscle tone or seizure activity.      Coordination: Coordination abnormal.      Gait: Gait abnormal (minimally shuffled, utilizing a cane).      Deep Tendon Reflexes: Reflexes normal.      Comments: DTRs difficult to assess, seemed to be a delayed but adequate response.     Very challenging neurological exam today due to seemingly slowed cognition.    Psychiatric:         Mood and Affect: Mood normal. Mood is not anxious or depressed.         Speech: He is communicative. Speech is delayed (slowed). Speech is not rapid and pressured, slurred or tangential.         Behavior: Behavior normal. Behavior is slowed. Behavior is not agitated, aggressive, withdrawn, hyperactive or combative.         Thought Content: Thought content normal. Thought content is not paranoid or delusional. Thought content does not include homicidal or suicidal ideation. Thought content does not include homicidal or suicidal plan.         Cognition and Memory: Cognition is impaired.         Judgment: Judgment normal. Judgment is not impulsive or inappropriate.         Common labs          2/19/2024    11:17 7/10/2024    11:41 7/10/2024    12:04   Common Labs   Glucose   230    BUN   6    Creatinine   0.74    Sodium   138    Potassium   3.8    Chloride   100    Calcium   9.6    Albumin   4.4    Total Bilirubin   0.5    Alkaline Phosphatase   94    AST (SGOT)   12    ALT (SGPT)   9    Total Cholesterol   129    Triglycerides   40    HDL Cholesterol   72    LDL Cholesterol    47    Hemoglobin A1C 8.5  7.8           Assessment / Plan      Assessment/Plan:   Diagnoses and all orders for this visit:    1. Memory impairment (Primary)  -     CBC & Differential  -     Comprehensive Metabolic Panel  -     Vitamin B12  -     TSH+Free T4  -     Ambulatory Referral to  Neurology    2. Shuffling gait  -     Ambulatory Referral to Neurology    3. Speech disturbance, unspecified type  -     Ambulatory Referral to Neurology    4. Elevated liver enzymes  -     CBC & Differential  -     Comprehensive Metabolic Panel       Refused urinalysis today stating he would be unable to provide a sample.    Very challenging neurological exam today due to seemingly slowed cognition.       I spent 34 minutes caring for Rafael on this date of service. This time includes time spent by me in the following activities:preparing for the visit, reviewing tests, obtaining and/or reviewing a separately obtained history, counseling and educating the patient/family/caregiver, ordering medications, tests, or procedures, and documenting information in the medical record    Follow Up:   No follow-ups on file.    Patient was given instructions and counseling regarding his condition or for health maintenance advice. Please see specific information pulled into the AVS if appropriate.     Haylee Galeano PA-C  Primary [facility]     Please note that portions of this note may have been completed with a voice recognition program.

## 2024-12-15 LAB
ALBUMIN SERPL-MCNC: 4.5 G/DL (ref 3.9–4.9)
ALP SERPL-CCNC: 79 IU/L (ref 44–121)
ALT SERPL-CCNC: 51 IU/L (ref 0–44)
AST SERPL-CCNC: 145 IU/L (ref 0–40)
BASOPHILS # BLD AUTO: 0.1 X10E3/UL (ref 0–0.2)
BASOPHILS NFR BLD AUTO: 1 %
BILIRUB SERPL-MCNC: 0.7 MG/DL (ref 0–1.2)
BUN SERPL-MCNC: 8 MG/DL (ref 8–27)
BUN/CREAT SERPL: 7 (ref 10–24)
CALCIUM SERPL-MCNC: 9.4 MG/DL (ref 8.6–10.2)
CHLORIDE SERPL-SCNC: 80 MMOL/L (ref 96–106)
CO2 SERPL-SCNC: 26 MMOL/L (ref 20–29)
CREAT SERPL-MCNC: 1.17 MG/DL (ref 0.76–1.27)
EGFRCR SERPLBLD CKD-EPI 2021: 68 ML/MIN/1.73
EOSINOPHIL # BLD AUTO: 0.1 X10E3/UL (ref 0–0.4)
EOSINOPHIL NFR BLD AUTO: 1 %
ERYTHROCYTE [DISTWIDTH] IN BLOOD BY AUTOMATED COUNT: 15.6 % (ref 11.6–15.4)
GLOBULIN SER CALC-MCNC: 2 G/DL (ref 1.5–4.5)
GLUCOSE SERPL-MCNC: 115 MG/DL (ref 70–99)
HCT VFR BLD AUTO: 31.3 % (ref 37.5–51)
HGB BLD-MCNC: 10.9 G/DL (ref 13–17.7)
IMM GRANULOCYTES # BLD AUTO: 0.1 X10E3/UL (ref 0–0.1)
IMM GRANULOCYTES NFR BLD AUTO: 1 %
LYMPHOCYTES # BLD AUTO: 3.5 X10E3/UL (ref 0.7–3.1)
LYMPHOCYTES NFR BLD AUTO: 52 %
MCH RBC QN AUTO: 31.2 PG (ref 26.6–33)
MCHC RBC AUTO-ENTMCNC: 34.8 G/DL (ref 31.5–35.7)
MCV RBC AUTO: 90 FL (ref 79–97)
MONOCYTES # BLD AUTO: 0.3 X10E3/UL (ref 0.1–0.9)
MONOCYTES NFR BLD AUTO: 5 %
NEUTROPHILS # BLD AUTO: 2.7 X10E3/UL (ref 1.4–7)
NEUTROPHILS NFR BLD AUTO: 40 %
PLATELET # BLD AUTO: 255 X10E3/UL (ref 150–450)
POTASSIUM SERPL-SCNC: 3.2 MMOL/L (ref 3.5–5.2)
PROT SERPL-MCNC: 6.5 G/DL (ref 6–8.5)
RBC # BLD AUTO: 3.49 X10E6/UL (ref 4.14–5.8)
SODIUM SERPL-SCNC: 122 MMOL/L (ref 134–144)
T4 FREE SERPL-MCNC: 0.1 NG/DL (ref 0.82–1.77)
TSH SERPL DL<=0.005 MIU/L-ACNC: 154 UIU/ML (ref 0.45–4.5)
VIT B12 SERPL-MCNC: 392 PG/ML (ref 232–1245)
WBC # BLD AUTO: 6.8 X10E3/UL (ref 3.4–10.8)

## 2024-12-17 ENCOUNTER — LAB (OUTPATIENT)
Dept: LAB | Facility: HOSPITAL | Age: 69
End: 2024-12-17
Payer: MEDICARE

## 2024-12-17 DIAGNOSIS — E03.9 ACQUIRED HYPOTHYROIDISM: ICD-10-CM

## 2024-12-17 DIAGNOSIS — E87.6 HYPOKALEMIA: Primary | ICD-10-CM

## 2024-12-17 DIAGNOSIS — D64.9 ANEMIA, UNSPECIFIED TYPE: ICD-10-CM

## 2024-12-17 DIAGNOSIS — E87.1 HYPONATREMIA: Primary | ICD-10-CM

## 2024-12-17 DIAGNOSIS — R74.8 ELEVATED LIVER ENZYMES: ICD-10-CM

## 2024-12-17 DIAGNOSIS — E87.6 HYPOKALEMIA: ICD-10-CM

## 2024-12-17 DIAGNOSIS — E87.1 HYPONATREMIA: ICD-10-CM

## 2024-12-17 LAB
ALBUMIN SERPL-MCNC: 4.3 G/DL (ref 3.5–5.2)
ALP SERPL-CCNC: 75 U/L (ref 39–117)
ALT SERPL W P-5'-P-CCNC: 43 U/L (ref 1–41)
ANION GAP SERPL CALCULATED.3IONS-SCNC: 13.9 MMOL/L (ref 5–15)
AST SERPL-CCNC: 97 U/L (ref 1–40)
BASOPHILS # BLD AUTO: 0.06 10*3/MM3 (ref 0–0.2)
BASOPHILS NFR BLD AUTO: 1 % (ref 0–1.5)
BILIRUB CONJ SERPL-MCNC: 0.2 MG/DL (ref 0–0.3)
BILIRUB INDIRECT SERPL-MCNC: 0.5 MG/DL
BILIRUB SERPL-MCNC: 0.7 MG/DL (ref 0–1.2)
BUN SERPL-MCNC: 8 MG/DL (ref 8–23)
BUN/CREAT SERPL: 7 (ref 7–25)
CALCIUM SPEC-SCNC: 9.3 MG/DL (ref 8.6–10.5)
CHLORIDE SERPL-SCNC: 83 MMOL/L (ref 98–107)
CO2 SERPL-SCNC: 28.1 MMOL/L (ref 22–29)
CREAT SERPL-MCNC: 1.15 MG/DL (ref 0.76–1.27)
DEPRECATED RDW RBC AUTO: 45.2 FL (ref 37–54)
EGFRCR SERPLBLD CKD-EPI 2021: 69.8 ML/MIN/1.73
EOSINOPHIL # BLD AUTO: 0.05 10*3/MM3 (ref 0–0.4)
EOSINOPHIL NFR BLD AUTO: 0.8 % (ref 0.3–6.2)
ERYTHROCYTE [DISTWIDTH] IN BLOOD BY AUTOMATED COUNT: 14.2 % (ref 12.3–15.4)
FERRITIN SERPL-MCNC: 583 NG/ML (ref 30–400)
GLUCOSE SERPL-MCNC: 142 MG/DL (ref 65–99)
HCT VFR BLD AUTO: 29.2 % (ref 37.5–51)
HGB BLD-MCNC: 10.8 G/DL (ref 13–17.7)
IMM GRANULOCYTES # BLD AUTO: 0.06 10*3/MM3 (ref 0–0.05)
IMM GRANULOCYTES NFR BLD AUTO: 1 % (ref 0–0.5)
LYMPHOCYTES # BLD AUTO: 3.17 10*3/MM3 (ref 0.7–3.1)
LYMPHOCYTES NFR BLD AUTO: 50.7 % (ref 19.6–45.3)
MCH RBC QN AUTO: 32.1 PG (ref 26.6–33)
MCHC RBC AUTO-ENTMCNC: 37 G/DL (ref 31.5–35.7)
MCV RBC AUTO: 86.9 FL (ref 79–97)
MONOCYTES # BLD AUTO: 0.24 10*3/MM3 (ref 0.1–0.9)
MONOCYTES NFR BLD AUTO: 3.8 % (ref 5–12)
NEUTROPHILS NFR BLD AUTO: 2.67 10*3/MM3 (ref 1.7–7)
NEUTROPHILS NFR BLD AUTO: 42.7 % (ref 42.7–76)
NRBC BLD AUTO-RTO: 0 /100 WBC (ref 0–0.2)
PHOSPHATE SERPL-MCNC: 4.1 MG/DL (ref 2.5–4.5)
PLATELET # BLD AUTO: 238 10*3/MM3 (ref 140–450)
PMV BLD AUTO: 10 FL (ref 6–12)
POTASSIUM SERPL-SCNC: 3.1 MMOL/L (ref 3.5–5.2)
PROT SERPL-MCNC: 6.2 G/DL (ref 6–8.5)
RBC # BLD AUTO: 3.36 10*6/MM3 (ref 4.14–5.8)
SODIUM SERPL-SCNC: 125 MMOL/L (ref 136–145)
WBC NRBC COR # BLD AUTO: 6.25 10*3/MM3 (ref 3.4–10.8)

## 2024-12-17 PROCEDURE — 84100 ASSAY OF PHOSPHORUS: CPT | Performed by: PHYSICIAN ASSISTANT

## 2024-12-17 PROCEDURE — 82728 ASSAY OF FERRITIN: CPT | Performed by: PHYSICIAN ASSISTANT

## 2024-12-17 PROCEDURE — 85025 COMPLETE CBC W/AUTO DIFF WBC: CPT | Performed by: PHYSICIAN ASSISTANT

## 2024-12-17 PROCEDURE — 80048 BASIC METABOLIC PNL TOTAL CA: CPT | Performed by: PHYSICIAN ASSISTANT

## 2024-12-17 PROCEDURE — 36415 COLL VENOUS BLD VENIPUNCTURE: CPT | Performed by: PHYSICIAN ASSISTANT

## 2024-12-17 PROCEDURE — 80076 HEPATIC FUNCTION PANEL: CPT | Performed by: PHYSICIAN ASSISTANT

## 2024-12-17 RX ORDER — LEVOTHYROXINE SODIUM 200 UG/1
200 TABLET ORAL DAILY
Qty: 90 TABLET | Refills: 1 | Status: SHIPPED | OUTPATIENT
Start: 2024-12-17

## 2024-12-18 ENCOUNTER — TELEPHONE (OUTPATIENT)
Dept: INTERNAL MEDICINE | Facility: CLINIC | Age: 69
End: 2024-12-18
Payer: MEDICARE

## 2024-12-18 NOTE — TELEPHONE ENCOUNTER
Caller: GISELA SAUNDERSEY    Relationship: Brother/Sister    Best call back number: [phone]     What is the best time to reach you: ANY    Who are you requesting to speak with (clinical staff, provider,  specific staff member): LUIGI    Do you know the name of the person who called: LUIGI    What was the call regarding: WAS TOLD TO GET LABS DRAWN. ASKING THE DATE HE IS SUPPOSED TO GO AND THE LOCATION. ALSO ADVISE IF FASTING.

## 2024-12-18 NOTE — TELEPHONE ENCOUNTER
Spoke with Shoshana, advised that patient will need to have fasting labs drawn around 12/30. States understanding and will let us know if they have further questions.

## 2024-12-31 DIAGNOSIS — D64.9 ANEMIA, UNSPECIFIED TYPE: ICD-10-CM

## 2024-12-31 DIAGNOSIS — E87.6 HYPOKALEMIA: ICD-10-CM

## 2024-12-31 DIAGNOSIS — E87.1 HYPONATREMIA: ICD-10-CM

## 2024-12-31 DIAGNOSIS — E03.9 ACQUIRED HYPOTHYROIDISM: Primary | ICD-10-CM

## 2025-01-04 RX ORDER — INSULIN GLARGINE 100 [IU]/ML
INJECTION, SOLUTION SUBCUTANEOUS
Qty: 12 ML | Refills: 0 | Status: SHIPPED | OUTPATIENT
Start: 2025-01-04

## 2025-01-21 DIAGNOSIS — E03.9 ACQUIRED HYPOTHYROIDISM: ICD-10-CM

## 2025-01-21 RX ORDER — LEVOTHYROXINE SODIUM 175 UG/1
175 TABLET ORAL
Qty: 90 TABLET | Refills: 0 | Status: SHIPPED | OUTPATIENT
Start: 2025-01-21

## 2025-02-17 RX ORDER — INSULIN GLARGINE-YFGN 100 [IU]/ML
INJECTION, SOLUTION SUBCUTANEOUS
Qty: 12 ML | Refills: 0 | Status: SHIPPED | OUTPATIENT
Start: 2025-02-17

## 2025-02-17 NOTE — TELEPHONE ENCOUNTER
Rx Refill Note  Requested Prescriptions     Pending Prescriptions Disp Refills    Semglee, yfgn, 100 UNIT/ML solution pen-injector [Pharmacy Med Name: Semglee (yfgn) 100 UNIT/ML Subcutaneous Solution Pen-injector] 12 mL 0     Sig: INJECT 12 UNITS SUBCUTANEOUSLY TWICE DAILY      Last office visit with prescribing clinician: 10/25/2024   Last telemedicine visit with prescribing clinician: Visit date not found   Next office visit with prescribing clinician: 10/27/2025                         Would you like a call back once the refill request has been completed: [] Yes [] No    If the office needs to give you a call back, can they leave a voicemail: [] Yes [] No    Shakeel Maldonado, [facility]  02/17/25, 10:30 EST

## 2025-02-18 NOTE — TELEPHONE ENCOUNTER
Patient should be following Endocrinology. Please make sure he is not having transportation issues.  Can refer to office in [place], if so.

## 2025-02-19 ENCOUNTER — OFFICE VISIT (OUTPATIENT)
Dept: NEUROLOGY | Facility: CLINIC | Age: 70
End: 2025-02-19
Payer: MEDICARE

## 2025-02-19 VITALS
WEIGHT: 147.4 LBS | DIASTOLIC BLOOD PRESSURE: 58 MMHG | TEMPERATURE: 98.4 F | BODY MASS INDEX: 23.13 KG/M2 | OXYGEN SATURATION: 99 % | RESPIRATION RATE: 14 BRPM | SYSTOLIC BLOOD PRESSURE: 104 MMHG | HEART RATE: 103 BPM | HEIGHT: 67 IN

## 2025-02-19 DIAGNOSIS — G31.84 MCI (MILD COGNITIVE IMPAIRMENT): Primary | ICD-10-CM

## 2025-02-19 DIAGNOSIS — R26.9 GAIT DISTURBANCE: ICD-10-CM

## 2025-02-19 DIAGNOSIS — G47.10 HYPERSOMNIA: ICD-10-CM

## 2025-02-19 RX ORDER — DONEPEZIL HYDROCHLORIDE 10 MG/1
10 TABLET, FILM COATED ORAL NIGHTLY
Qty: 30 TABLET | Refills: 2 | Status: SHIPPED | OUTPATIENT
Start: 2025-02-19 | End: 2026-02-19

## 2025-02-19 NOTE — PROGRESS NOTES
"     New Patient Office Visit      Patient Name: Rafael Bocanegra  : 1955   MRN: [id number]     Referring Physician: Haylee Galeano, *    Chief Complaint:    Chief Complaint   Patient presents with    Establish Care     Patient is in office to establish care for gait disturbance, memory, and dysphasia.     Pt reports he has diabetic seizures. His last one was 3 years.        History of Present Illness: Rafael Bocanegra is a 70 y.o. male who is here today to establish care with Neurology.  He has never been seen before in Neurology. He was referred to us from PCP ([facility]) for subjective memory impairment, speech disturbance and shuffling gait.     He co \"I can't think like I used too!\" Sister reports he has been struggling with word finding and processing speed in conversations. Sister reports it takes him longer to respond. Sister feels he doesn't understand what she is telling him sometimes- like he's confused. Gradual onset over the past 2 years and memory seems worse in the past 4-5 months. He feels he is struggling to remember to complete tasks such as taking out the trash. He does not drive. His sister is managing the household finances. He is buying his groceries but needs assistance. Sister is doing the cooking and household cleaning. Self ADLs. He will make himself microwaves dinners and eggs and sandwiches. He gets good rest and denies sleeping disturbances. + snoring with frequent night time waking. Admits to feeling tired in AM. Both sisters have noticed memory impairment. Speech disturbance is described as prolonged response time to questioning. Sister reports his walking has slowed and he seems to lean towards the right. No falls. No shuffling of feet. He feels weak in his legs and arms at times.     SOCIAL: Happily . Lives with sisterShoshana. No children. [employer] and . Current 2 [employer] tobacco use with no interest in cessation.  NO ETOH. No recreational " drugs or cannabis. No smokeless tobacco or vaping. 12th grade education.     [facility] Neuro: Father- Dementia, Maternal Aunt- Dementia    Recent Imaging: NONE    Pertinent Medical History: Hyperlipidemia, DM 1, hypothyroidism, vitamin D deficiency, asthma, seizures    Subjective      Review of Systems:   Review of Systems   Constitutional: Negative.    HENT: Negative.     Eyes: Negative.    Respiratory: Negative.     Cardiovascular: Negative.    Gastrointestinal: Negative.    Endocrine: Negative.    Genitourinary: Negative.    Musculoskeletal:  Positive for gait problem.   Skin: Negative.    Allergic/Immunologic: Negative.    Neurological:  Positive for speech difficulty and memory problem.   Hematological: Negative.    Psychiatric/Behavioral: Negative.     All other systems reviewed and are negative.      Past Medical History:   Past Medical History:   Diagnosis Date    Arthritis     COPD (chronic obstructive pulmonary disease)     Diabetes mellitus     Diabetes mellitus type I     Disease of thyroid gland     H/O exercise stress test     Hyperlipidemia     Hyperthyroidism     Hypothyroidism     Seizure     diabetic       Past Surgical History:   Past Surgical History:   Procedure Laterality Date    ARM DEBRIDEMENT      Broken glass removed from the right arm back around 1980s.    COLONOSCOPY N/A 8/21/2024    Procedure: COLONOSCOPY INCOMPLETE;  Surgeon: Nhi Gardner DO;  Location[facility] ENDOSCOPY;  Service: Gastroenterology;  Laterality: N/A;       Family History:   Family History   Problem Relation Age of Onset    Cancer Mother         Cancer in her lymph nodes.     Thyroid disease Sister        Social History:   Social History     Socioeconomic History    Marital status: Single   Tobacco Use    Smoking status: Every Day     Current packs/day: 2.00     Average packs/day: 2.0 packs/day for 47.0 years (94.0 ttl pk-yrs)     Types: Cigarettes    Smokeless tobacco: Never   Vaping Use    Vaping status: Never Used    Substance and Sexual Activity    Alcohol use: Never    Drug use: Not Currently     Types: Cocaine(coke), Marijuana     Comment: stopped using in 1987    Sexual activity: Defer       Medications:     Current Outpatient Medications:     albuterol sulfate  (90 Base) MCG/ACT inhaler, Inhale 2 puffs Every 4 (Four) Hours As Needed for Wheezing or Shortness of Air., Disp: 18 g, Rfl: 11    aspirin 81 MG EC tablet, Take 1 tablet by mouth Daily., Disp: 90 tablet, Rfl: 1    atorvastatin (LIPITOR) 10 MG tablet, Take 1 tablet by mouth Every Night., Disp: 90 tablet, Rfl: 1    bisacodyl 5 MG EC tablet, Take 4 tablets at 1:00pm with 8oz of clear liquids the day before your colonoscopy., Disp: 4 tablet, Rfl: 0    buPROPion SR (Wellbutrin SR) 150 MG 12 hr tablet, Take 1 tablet by mouth 2 (Two) Times a Day. Second dose should be taken between 5-6 pm, Disp: 180 tablet, Rfl: 1    Continuous Glucose  (Dexcom G7 ) device, Use 1 Units Every 5 (Five) Years., Disp: 1 each, Rfl: 0    Continuous Glucose Sensor (Dexcom G7 Sensor) misc, Use 1 each Every 10 (Ten) Days., Disp: 9 each, Rfl: 3    Glucagon (Gvoke HypoPen 2-Pack) 0.5 MG/0.1ML solution auto-injector, Inject 1 application  under the skin into the appropriate area as directed Daily As Needed (low blood sugar)., Disp: 0.2 mL, Rfl: 1    Insulin Lispro, 1 Unit Dial, (HumaLOG KwikPen) 100 UNIT/ML solution pen-injector, Change from R insulin, take 5-6 u before meals, correction 1:50 for >150, MDD 50 u, Disp: 15 mL, Rfl: 5    Insulin Syringe/Needle U-500 31G X 6MM 0.5 ML misc, Use 1 Application 3 (Three) Times a Day With Meals., Disp: 270 each, Rfl: 3    levothyroxine (SYNTHROID, LEVOTHROID) 175 MCG tablet, Take 1 tablet by mouth Every Morning., Disp: 90 tablet, Rfl: 0    OneTouch Ultra test strip, USE 1 STRIP TO CHECK GLUCOSE THREE TIMES DAILY, Disp: 300 each, Rfl: 3    polyethylene glycol (MIRALAX) 17 GM/SCOOP powder, Mix 238g powder with 64 oz of clear liquid at  "4:00pm. Drink 8 oz every 10-15 minutes until consumed., Disp: 238 g, Rfl: 0    rOPINIRole (REQUIP) 2 MG tablet, Take 1 tablet by mouth Every Night. Take 1 hour before bedtime., Disp: 90 tablet, Rfl: 1    Semglangelito yfgn, 100 UNIT/ML solution pen-injector, INJECT 12 UNITS SUBCUTANEOUSLY TWICE DAILY, Disp: 12 mL, Rfl: 0    triamcinolone (KENALOG) 0.5 % cream, Apply 1 Application topically to the appropriate area as directed 2 (Two) Times a Day As Needed for Irritation., Disp: 454 g, Rfl: 0    vitamin D (ERGOCALCIFEROL) 1.25 MG (54226 UT) capsule capsule, Take 1 capsule by mouth 1 (One) Time Per Week., Disp: 12 capsule, Rfl: 0    donepezil (ARICEPT) 10 MG tablet, Take 1 tablet by mouth Every Night., Disp: 30 tablet, Rfl: 2    Allergies:   No Known Allergies    Objective     Physical Exam:  Vital Signs:   Vitals:    02/19/25 1034   BP: 104/58   BP Location: Left arm   Patient Position: Sitting   Cuff Size: Adult   Pulse: 103   Resp: 14   Temp: 98.4 °F (36.9 °C)   TempSrc: Infrared   SpO2: 99%   Weight: 66.9 kg (147 lb 6.4 oz)   Height: 170.2 cm (67.01\")   PainSc: 1    PainLoc: Ankle  Comment: left     Body mass index is 23.08 kg/m².     Physical Exam  Vitals and nursing note reviewed.   Constitutional:       General: He is not in acute distress.     Appearance: Normal appearance.   HENT:      Head: Normocephalic.      Nose: Nose normal.      Mouth/Throat:      Mouth: Mucous membranes are moist.      Pharynx: Oropharynx is clear.   Eyes:      General: Lids are normal.      Extraocular Movements: Extraocular movements intact.      Conjunctiva/sclera: Conjunctivae normal.      Pupils: Pupils are equal, round, and reactive to light.   Musculoskeletal:      Cervical back: Normal range of motion and neck supple.   Skin:     General: Skin is warm and dry.      Capillary Refill: Capillary refill takes less than 2 seconds.   Neurological:      General: No focal deficit present.      Mental Status: He is alert and oriented to " person, place, and time.      Cranial Nerves: Dysarthria present. No cranial nerve deficit.      Sensory: No sensory deficit.      Motor: Weakness present.      Coordination: Romberg sign negative. Coordination normal.      Gait: Gait abnormal.      Deep Tendon Reflexes: Reflexes normal.   Psychiatric:         Mood and Affect: Mood normal.         Behavior: Behavior normal.         Neurological Exam  Mental Status  Alert. Oriented to person, place, and time. Recalls 3 of 3 objects immediately. At 5 minutes recalls 1 of 3 objects. Recalls 1 of 3 objects with prompting. Able to copy figure. Mild dysarthria present. Able to name objects, name parts of objects, repeat, read and write. Follows three-step commands. Able to perform serial calculations. Able to spell words backwards. Digit span was 3 forward and 3 backward. MMSE score: 24.    Cranial Nerves  CN II: Visual acuity is normal. Visual fields full to confrontation.  CN III, IV, VI: Extraocular movements intact bilaterally. Normal lids and orbits bilaterally. Pupils equal round and reactive to light bilaterally.  CN V: Facial sensation is normal.  CN VII: Full and symmetric facial movement.  CN IX, X: Palate elevates symmetrically. Normal gag reflex.  CN XI: Shoulder shrug strength is normal.  CN XII: Tongue midline without atrophy or fasciculations.    Motor  Normal muscle bulk throughout. No fasciculations present. Normal muscle tone. The following abnormal movements were seen:  RUE 4/5  LUE 5/5  BLE 5/5  Right hand dominate  Bilateral fine amplitude action tremor.    Sensory  Light touch is normal in upper and lower extremities.     Coordination  Right: Finger-to-nose normal. Rapid alternating movement normal.Left: Finger-to-nose normal. Rapid alternating movement normal.    Gait   Abnormal gait. Romberg is absent. Able to rise from chair without using arms.  He is able to ambulate 25 feet in 15 seconds with causal slight rightward leaning gait with limited arm  swing on the right. He is able to move from sitting to standing without use of hands. .      PHQ-9 Total Score:       Assessment / Plan      Assessment/Plan:   Diagnoses and all orders for this visit:    1. MCI (mild cognitive impairment) (Primary)  -     MRI Brain With & Without Contrast; Future  -     Vitamin B12; Future  -     Folate; Future  -     Heavy Metals Profile II, Blood; Future  -     RPR; Future  -     TSH; Future  -     Ammonia; Future  -     ATN Profile; Future  -     donepezil (ARICEPT) 10 MG tablet; Take 1 tablet by mouth Every Night.  Dispense: 30 tablet; Refill: 2    2. Gait disturbance  -     MRI Brain With & Without Contrast; Future  -     Ambulatory Referral to Physical Therapy for Evaluation & Treatment    3. Hypersomnia  -     Home Sleep Study; Future         Follow Up:   Return in about 3 months (around 5/19/2025), or if symptoms worsen or fail to improve.    Anticipatory Guidance and Safety Reviewed  Patient Education Provided   MRI brain with and without rule out demyelinating disease  Labs: Vitamin B12, folate, heavy metal, RPR, TSH, ammonia, ATN profile  Begin Aricept 5 mg Daily x 7 days and then increase to 10 mg QHS; SE reviewed  MMSE 24/30  Buchanan Score 3  Home Sleep Study  PT/OT referral for gait     RTC PRN or with 12 weeks or sooner with issues     ELIAS Ramirez  [facility] Neurology and Sleep Medicine

## 2025-02-19 NOTE — TELEPHONE ENCOUNTER
"Relay     \"Left detailed message for pt  Patient should be following Endocrinology. Please make sure he is not having transportation issues.  Can refer to office in [place], if so.    \"                 "

## 2025-03-10 ENCOUNTER — LAB (OUTPATIENT)
Dept: LAB | Facility: HOSPITAL | Age: 70
End: 2025-03-10
Payer: MEDICARE

## 2025-03-10 ENCOUNTER — HOSPITAL ENCOUNTER (OUTPATIENT)
Dept: SLEEP MEDICINE | Facility: HOSPITAL | Age: 70
Discharge: HOME | End: 2025-03-10
Admitting: NURSE PRACTITIONER
Payer: MEDICARE

## 2025-03-10 DIAGNOSIS — E03.9 ACQUIRED HYPOTHYROIDISM: ICD-10-CM

## 2025-03-10 DIAGNOSIS — G31.84 MCI (MILD COGNITIVE IMPAIRMENT): ICD-10-CM

## 2025-03-10 DIAGNOSIS — E10.65 TYPE 1 DIABETES MELLITUS WITH HYPERGLYCEMIA: ICD-10-CM

## 2025-03-10 DIAGNOSIS — G47.10 HYPERSOMNIA: ICD-10-CM

## 2025-03-10 LAB
AMMONIA BLD-SCNC: 15 UMOL/L (ref 16–60)
FOLATE SERPL-MCNC: 14.6 NG/ML (ref 4.78–24.2)
VIT B12 BLD-MCNC: 254 PG/ML (ref 211–946)

## 2025-03-10 PROCEDURE — 83825 ASSAY OF MERCURY: CPT

## 2025-03-10 PROCEDURE — 83520 IMMUNOASSAY QUANT NOS NONAB: CPT

## 2025-03-10 PROCEDURE — 82175 ASSAY OF ARSENIC: CPT

## 2025-03-10 PROCEDURE — 83655 ASSAY OF LEAD: CPT

## 2025-03-10 PROCEDURE — 86592 SYPHILIS TEST NON-TREP QUAL: CPT

## 2025-03-10 PROCEDURE — 84439 ASSAY OF FREE THYROXINE: CPT | Performed by: PHYSICIAN ASSISTANT

## 2025-03-10 PROCEDURE — 84443 ASSAY THYROID STIM HORMONE: CPT | Performed by: PHYSICIAN ASSISTANT

## 2025-03-10 PROCEDURE — 82746 ASSAY OF FOLIC ACID SERUM: CPT

## 2025-03-10 PROCEDURE — 82300 ASSAY OF CADMIUM: CPT

## 2025-03-10 PROCEDURE — 82140 ASSAY OF AMMONIA: CPT

## 2025-03-10 PROCEDURE — 36415 COLL VENOUS BLD VENIPUNCTURE: CPT

## 2025-03-10 PROCEDURE — 82607 VITAMIN B-12: CPT

## 2025-03-11 ENCOUNTER — RESULTS FOLLOW-UP (OUTPATIENT)
Dept: INTERNAL MEDICINE | Facility: CLINIC | Age: 70
End: 2025-03-11
Payer: MEDICARE

## 2025-03-11 DIAGNOSIS — E03.9 ACQUIRED HYPOTHYROIDISM: ICD-10-CM

## 2025-03-11 LAB — RPR SER QL: NORMAL

## 2025-03-11 RX ORDER — LEVOTHYROXINE SODIUM 150 UG/1
150 TABLET ORAL
Qty: 90 TABLET | Refills: 0 | Status: SHIPPED | OUTPATIENT
Start: 2025-03-11

## 2025-03-13 LAB
A -- BETA-AMYLOID 42/40 RATIO: 0.11
AL BETA40 PLAS-MCNC: 188.08 PG/ML
AL BETA42 PLAS-MCNC: 20.6 PG/ML
ARSENIC BLD-MCNC: <1 UG/L (ref 0–9)
ATN SUMMARY1: ABNORMAL
CADMIUM BLD-MCNC: 0.5 UG/L (ref 0–1.2)
INFORMATION:: ABNORMAL
LEAD BLDV-MCNC: 1.8 UG/DL (ref 0–3.4)
MERCURY BLD-MCNC: <1 UG/L (ref 0–14.9)
N -- NFL, PLASMA: 5.59 PG/ML (ref 0–4.61)
T -- P-TAU181: 0.43 PG/ML (ref 0–0.97)

## 2025-04-03 ENCOUNTER — HOSPITAL ENCOUNTER (OUTPATIENT)
Dept: MRI IMAGING | Facility: HOSPITAL | Age: 70
Discharge: HOME | End: 2025-04-03
Admitting: NURSE PRACTITIONER
Payer: MEDICARE

## 2025-04-03 DIAGNOSIS — R26.9 GAIT DISTURBANCE: ICD-10-CM

## 2025-04-03 DIAGNOSIS — G31.84 MCI (MILD COGNITIVE IMPAIRMENT): ICD-10-CM

## 2025-04-03 PROCEDURE — 25510000002 GADOBENATE DIMEGLUMINE 529 MG/ML SOLUTION: Performed by: NURSE PRACTITIONER

## 2025-04-03 PROCEDURE — A9577 INJ MULTIHANCE: HCPCS | Performed by: NURSE PRACTITIONER

## 2025-04-03 PROCEDURE — 70553 MRI BRAIN STEM W/O & W/DYE: CPT

## 2025-04-03 RX ADMIN — GADOBENATE DIMEGLUMINE 13 ML: 529 INJECTION, SOLUTION INTRAVENOUS at 08:13

## 2025-04-21 ENCOUNTER — LAB (OUTPATIENT)
Dept: LAB | Facility: HOSPITAL | Age: 70
End: 2025-04-21
Payer: MEDICARE

## 2025-04-21 LAB
ALBUMIN UR-MCNC: <1.2 MG/DL
CREAT UR-MCNC: 27.1 MG/DL
MICROALBUMIN/CREAT UR: NORMAL MG/G{CREAT}

## 2025-04-21 PROCEDURE — 84443 ASSAY THYROID STIM HORMONE: CPT | Performed by: PHYSICIAN ASSISTANT

## 2025-04-21 PROCEDURE — 84439 ASSAY OF FREE THYROXINE: CPT | Performed by: PHYSICIAN ASSISTANT

## 2025-04-24 DIAGNOSIS — E10.65 TYPE 1 DIABETES MELLITUS WITH HYPERGLYCEMIA: ICD-10-CM

## 2025-04-24 RX ORDER — INSULIN LISPRO 100 [IU]/ML
INJECTION, SOLUTION INTRAVENOUS; SUBCUTANEOUS
Qty: 15 ML | Refills: 5 | OUTPATIENT
Start: 2025-04-24

## 2025-04-24 NOTE — TELEPHONE ENCOUNTER
Caller: CYNTHIA VANG    Relationship: Emergency Contact    Best call back number:    Requested Prescriptions:   Requested Prescriptions     Pending Prescriptions Disp Refills    Insulin Lispro, 1 Unit Dial, (HumaLOG KwikPen) 100 UNIT/ML solution pen-injector 15 mL 5     Sig: Change from R insulin, take 5-6 u before meals, correction 1:50 for >150, MDD 50 u    SEMGLEE  WOULD NOT PICK THIS DUE TO MESSAGE     Pharmacy where request should be sent: [address] [phone] [facility] [phone] FX     Last office visit with prescribing clinician: 10/25/2024   Last telemedicine visit with prescribing clinician: Visit date not found   Next office visit with prescribing clinician: 10/27/2025     Additional details provided by patient:  THE PATIENT DOES NOT SEEM SURE ABOUT THIS.  PLEASE MAKE SURE THIS IS CORRECT.    Does the patient have less than a 3 day supply:  [x] Yes  [] No    Would you like a call back once the refill request has been completed: [] Yes [x] No    If the office needs to give you a call back, can they leave a voicemail: [x] Yes [] No    Stacie Lizama Rep   04/24/25 14:42 EDT

## 2025-05-02 RX ORDER — INSULIN GLARGINE-YFGN 100 [IU]/ML
12 INJECTION, SOLUTION SUBCUTANEOUS 2 TIMES DAILY
Qty: 12 ML | Refills: 0 | Status: SHIPPED | OUTPATIENT
Start: 2025-05-02

## 2025-05-02 NOTE — TELEPHONE ENCOUNTER
Caller: CYNTHIA VANG    Relationship: Emergency Contact    Best call back number: [phone]     Requested Prescriptions:   Requested Prescriptions     Pending Prescriptions Disp Refills    Insulin Glargine-yfgn (Semglee, yfgn,) 100 UNIT/ML solution pen-injector 12 mL 0    AND LANTUS!     Pharmacy where request should be sent: [facility] [address] [place] [address] [phone] [facility] [phone] FX     Last office visit with prescribing clinician: 10/25/2024   Last telemedicine visit with prescribing clinician: Visit date not found   Next office visit with prescribing clinician: 10/27/2025     Additional details provided by patient: PATIENT ALSO STATED THAT HE TAKES LANTUS. PLEASE CALL AND ADVISE

## 2025-05-02 NOTE — TELEPHONE ENCOUNTER
Rx Refill Note  Requested Prescriptions     Pending Prescriptions Disp Refills    Insulin Glargine-yfgn (Semglee, yfelvin,) 100 UNIT/ML solution pen-injector 12 mL 0      Last office visit with prescribing clinician: 10/25/2024   Last telemedicine visit with prescribing clinician: Visit date not found   Next office visit with prescribing clinician: 10/27/2025                         Would you like a call back once the refill request has been completed: [] Yes [] No    If the office needs to give you a call back, can they leave a voicemail: [] Yes [] No    Nithya Ba MA  05/02/25, 10:41 EDT

## 2025-05-08 ENCOUNTER — OFFICE VISIT (OUTPATIENT)
Dept: NEUROLOGY | Facility: CLINIC | Age: 70
End: 2025-05-08
Payer: MEDICARE

## 2025-05-08 VITALS
HEIGHT: 67 IN | WEIGHT: 142.4 LBS | SYSTOLIC BLOOD PRESSURE: 128 MMHG | HEART RATE: 86 BPM | BODY MASS INDEX: 22.35 KG/M2 | DIASTOLIC BLOOD PRESSURE: 56 MMHG | TEMPERATURE: 97.4 F | OXYGEN SATURATION: 97 % | RESPIRATION RATE: 14 BRPM

## 2025-05-08 DIAGNOSIS — G31.84 MCI (MILD COGNITIVE IMPAIRMENT): Primary | ICD-10-CM

## 2025-05-08 DIAGNOSIS — R25.9 MIXED ACTION AND RESTING TREMOR: ICD-10-CM

## 2025-05-08 RX ORDER — PRIMIDONE 50 MG/1
25 TABLET ORAL NIGHTLY
Qty: 30 TABLET | Refills: 5 | Status: SHIPPED | OUTPATIENT
Start: 2025-05-08

## 2025-05-08 RX ORDER — DONEPEZIL HYDROCHLORIDE 10 MG/1
10 TABLET, FILM COATED ORAL NIGHTLY
Qty: 30 TABLET | Refills: 5 | Status: SHIPPED | OUTPATIENT
Start: 2025-05-08 | End: 2026-05-08

## 2025-05-08 RX ORDER — MEMANTINE HYDROCHLORIDE 10 MG/1
10 TABLET ORAL DAILY
Qty: 30 TABLET | Refills: 5 | Status: SHIPPED | OUTPATIENT
Start: 2025-05-08

## 2025-05-08 NOTE — PROGRESS NOTES
Follow Up Office Visit      Patient Name: Rafael Bocanegra  : 1955   MRN: [id number]     Chief Complaint:    Chief Complaint   Patient presents with    Follow-up     MCI    Results     MRI Brain       History of Present Illness: Rafael Bocanegra is a 70 y.o. male who is here today to follow up with neurology for MCI.  He was last seen in clinic  ([facility]).     Rafael would like to discuss MRI and labs today.  He did not complete his sleep study.  He did go to physical therapy but did not find it to be helpful.  He continues to complain of hypersomnia.  + snoring with frequent night time waking. Admits to feeling tired in AM.  He has not completed his home sleep study.  He was started on Aricept 10 mg daily and reports good tolerance and compliance with medication.  He does not feel the medication has improved things that much. His sister is managing the household finances. He is buying his groceries but needs assistance. Sister is doing the cooking and household cleaning. Self ADLs. He will make himself microwaves dinners and eggs and sandwiches. Speech disturbance is described as prolonged response time to questioning.     Tremor to bilateral hands at rest. Onset > 1 year ago. Right hand dominate. No exacerbating factors. Tremor is present in left hand at rest and with use. Last TSH 4.83 ().    Recent Imaging: NONE     Pertinent Medical History: Hyperlipidemia, DM 1, hypothyroidism, vitamin D deficiency, asthma, seizures    Subjective      Review of Systems:   Review of Systems   Constitutional: Negative.    HENT: Negative.     Eyes: Negative.    Respiratory: Negative.     Cardiovascular: Negative.    Gastrointestinal: Negative.    Endocrine: Negative.    Genitourinary: Negative.    Musculoskeletal: Negative.    Skin: Negative.    Allergic/Immunologic: Negative.    Neurological:  Positive for tremors and memory problem.   Hematological: Negative.    Psychiatric/Behavioral: Negative.     All  other systems reviewed and are negative.      I have reviewed and the following portions of the patient's history were updated as appropriate: past family history, past medical history, past social history, past surgical history and problem list.    Medications:     Current Outpatient Medications:     albuterol sulfate  (90 Base) MCG/ACT inhaler, Inhale 2 puffs Every 4 (Four) Hours As Needed for Wheezing or Shortness of Air., Disp: 18 g, Rfl: 11    aspirin 81 MG EC tablet, Take 1 tablet by mouth Daily., Disp: 90 tablet, Rfl: 1    atorvastatin (LIPITOR) 10 MG tablet, Take 1 tablet by mouth Every Night., Disp: 90 tablet, Rfl: 1    bisacodyl 5 MG EC tablet, Take 4 tablets at 1:00pm with 8oz of clear liquids the day before your colonoscopy., Disp: 4 tablet, Rfl: 0    buPROPion SR (Wellbutrin SR) 150 MG 12 hr tablet, Take 1 tablet by mouth 2 (Two) Times a Day. Second dose should be taken between 5-6 pm, Disp: 180 tablet, Rfl: 1    Continuous Glucose  (Dexcom G7 ) device, Use 1 Units Every 5 (Five) Years., Disp: 1 each, Rfl: 0    Continuous Glucose Sensor (Dexcom G7 Sensor) misc, Use 1 each Every 10 (Ten) Days., Disp: 9 each, Rfl: 3    donepezil (ARICEPT) 10 MG tablet, Take 1 tablet by mouth Every Night., Disp: 30 tablet, Rfl: 5    Glucagon (Gvoke HypoPen 2-Pack) 0.5 MG/0.1ML solution auto-injector, Inject 1 application  under the skin into the appropriate area as directed Daily As Needed (low blood sugar)., Disp: 0.2 mL, Rfl: 1    Insulin Glargine-yfgn (Semglee, yfgn,) 100 UNIT/ML solution pen-injector, Inject 12 Units under the skin into the appropriate area as directed 2 (Two) Times a Day., Disp: 12 mL, Rfl: 0    Insulin Lispro, 1 Unit Dial, (HumaLOG KwikPen) 100 UNIT/ML solution pen-injector, Change from R insulin, take 5-6 u before meals, correction 1:50 for >150, MDD 50 u, Disp: 15 mL, Rfl: 5    Insulin Syringe/Needle U-500 31G X 6MM 0.5 ML misc, Use 1 Application 3 (Three) Times a Day With  "Meals., Disp: 270 each, Rfl: 3    levothyroxine (SYNTHROID, LEVOTHROID) 150 MCG tablet, Take 1 tablet by mouth Every Morning., Disp: 90 tablet, Rfl: 1    OneTouch Ultra test strip, USE 1 STRIP TO CHECK GLUCOSE THREE TIMES DAILY, Disp: 300 each, Rfl: 3    polyethylene glycol (MIRALAX) 17 GM/SCOOP powder, Mix 238g powder with 64 oz of clear liquid at 4:00pm. Drink 8 oz every 10-15 minutes until consumed., Disp: 238 g, Rfl: 0    rOPINIRole (REQUIP) 2 MG tablet, Take 1 tablet by mouth Every Night. Take 1 hour before bedtime., Disp: 90 tablet, Rfl: 1    triamcinolone (KENALOG) 0.5 % cream, Apply 1 Application topically to the appropriate area as directed 2 (Two) Times a Day As Needed for Irritation., Disp: 454 g, Rfl: 0    vitamin D (ERGOCALCIFEROL) 1.25 MG (06891 UT) capsule capsule, Take 1 capsule by mouth 1 (One) Time Per Week., Disp: 12 capsule, Rfl: 0    memantine (NAMENDA) 10 MG tablet, Take 1 tablet by mouth Daily., Disp: 30 tablet, Rfl: 5    primidone (MYSOLINE) 50 MG tablet, Take 0.5 tablets by mouth Every Night., Disp: 30 tablet, Rfl: 5    Allergies:   No Known Allergies    Objective     Physical Exam:  Vital Signs:   Vitals:    05/08/25 0949   BP: 128/56   BP Location: Left arm   Patient Position: Sitting   Cuff Size: Adult   Pulse: 86   Resp: 14   Temp: 97.4 °F (36.3 °C)   TempSrc: Temporal   SpO2: 97%   Weight: 64.6 kg (142 lb 6.4 oz)   Height: 170.2 cm (67.01\")   PainSc: 0-No pain     Body mass index is 22.3 kg/m².    Physical Exam  Vitals and nursing note reviewed.   HENT:      Head: Normocephalic.      Nose: Nose normal.      Mouth/Throat:      Mouth: Mucous membranes are moist.      Pharynx: Oropharynx is clear.   Eyes:      General: Lids are normal.      Extraocular Movements: Extraocular movements intact.      Conjunctiva/sclera: Conjunctivae normal.      Pupils: Pupils are equal, round, and reactive to light.   Skin:     General: Skin is warm and dry.      Capillary Refill: Capillary refill takes less " than 2 seconds.   Neurological:      Mental Status: He is alert and oriented to person, place, and time.      Cranial Nerves: Dysarthria present. No cranial nerve deficit.      Sensory: No sensory deficit.      Motor: No weakness.      Coordination: Coordination abnormal.      Gait: Gait abnormal.   Psychiatric:         Mood and Affect: Mood normal.         Behavior: Behavior normal.         Neurological Exam  Mental Status  Alert. Oriented to person, place and time. Oriented to person, place, and time. Recalls 3 of 3 objects immediately. At 5 minutes recalls 2 of 3 objects. Recalls 2 of 3 objects with prompting. Able to copy figure. Moderate dysarthria present. Able to name objects, name parts of objects, repeat, read and write. Follows complex commands. Able to perform serial calculations. Able to spell words backwards. Digit span was 3 forward and 3 backward. MMSE score: 25.    Cranial Nerves  CN II: Visual acuity is normal. Visual fields full to confrontation.  CN III, IV, VI: Extraocular movements intact bilaterally. Normal lids and orbits bilaterally. Pupils equal round and reactive to light bilaterally.  CN V: Facial sensation is normal.  CN VII: Full and symmetric facial movement.  CN IX, X: Palate elevates symmetrically. Normal gag reflex.  CN XI: Shoulder shrug strength is normal.  CN XII: Tongue midline without atrophy or fasciculations.    Motor  Normal muscle bulk throughout. No fasciculations present. Normal muscle tone. The following abnormal movements were seen: No pronator drift.  Fine amplitude action tremor noted to bilateral upper extremities.  Tremor to left hand more course than right and tremor is also present at rest. Normal open close fist. Tremor does increase with intention .    Sensory  Light touch is normal in upper and lower extremities.     Gait   Abnormal gait.Casual gait: Narrow stance. Reduced stride length. Reduced right arm swing. Normal left arm swing. Able to rise from chair  without using arms. Timed get up and go test: 15 seconds.  He is able to ambulate 25 feet in 15 seconds with causal slight rightward leaning gait with limited arm swing on the right. He is able to move from sitting to standing without use of hands..       Assessment / Plan      Assessment/Plan:   Diagnoses and all orders for this visit:    1. MCI (mild cognitive impairment) (Primary)  -     donepezil (ARICEPT) 10 MG tablet; Take 1 tablet by mouth Every Night.  Dispense: 30 tablet; Refill: 5  -     memantine (NAMENDA) 10 MG tablet; Take 1 tablet by mouth Daily.  Dispense: 30 tablet; Refill: 5    2. Mixed action and resting tremor  -     primidone (MYSOLINE) 50 MG tablet; Take 0.5 tablets by mouth Every Night.  Dispense: 30 tablet; Refill: 5         Follow Up:   Return in about 6 months (around 11/8/2025), or if symptoms worsen or fail to improve.    Anticipatory Guidance and Safety Reviewed  Patient Education Provided   Continue Aricept 10 mg Daily; SE reviewed  Begin Namenda 10 mg Daily; SE reviewed.   MMSE 25/30  Complete Home Sleep Study  Begin Primidone 25 mg QHS; SE reviewed      RTC PRN or with 6 months or sooner with issues    Shirley Craig, GURVINDER, APRN, FNP-C  [facility] Neurology and Sleep Medicine

## 2025-05-10 DIAGNOSIS — G25.81 RLS (RESTLESS LEGS SYNDROME): ICD-10-CM

## 2025-05-10 RX ORDER — ROPINIROLE 2 MG/1
TABLET, FILM COATED ORAL
Qty: 90 TABLET | Refills: 0 | Status: SHIPPED | OUTPATIENT
Start: 2025-05-10

## 2025-06-24 DIAGNOSIS — E10.65 UNCONTROLLED TYPE 1 DIABETES MELLITUS WITH HYPERGLYCEMIA: ICD-10-CM

## 2025-06-24 DIAGNOSIS — E10.65 TYPE 1 DIABETES MELLITUS WITH HYPERGLYCEMIA: ICD-10-CM

## 2025-06-24 RX ORDER — GLUCAGON INJECTION, SOLUTION 0.5 MG/.1ML
1 INJECTION, SOLUTION SUBCUTANEOUS DAILY PRN
Qty: 0.2 ML | Refills: 1 | Status: SHIPPED | OUTPATIENT
Start: 2025-06-24

## 2025-06-24 RX ORDER — INSULIN GLARGINE-YFGN 100 [IU]/ML
12 INJECTION, SOLUTION SUBCUTANEOUS 2 TIMES DAILY
Qty: 12 ML | Refills: 0 | Status: SHIPPED | OUTPATIENT
Start: 2025-06-24 | End: 2025-06-27

## 2025-06-24 RX ORDER — ACYCLOVIR 400 MG/1
1 TABLET ORAL
Qty: 9 EACH | Refills: 3 | Status: SHIPPED | OUTPATIENT
Start: 2025-06-24

## 2025-06-24 RX ORDER — INSULIN LISPRO 100 [IU]/ML
INJECTION, SOLUTION INTRAVENOUS; SUBCUTANEOUS
Qty: 15 ML | Refills: 5 | Status: SHIPPED | OUTPATIENT
Start: 2025-06-24

## 2025-06-24 NOTE — TELEPHONE ENCOUNTER
Caller: WARD OR DELBERT ASENCIO    Relationship: Brother/Sister    Best call back number: [phone]     Requested Prescriptions:   Requested Prescriptions     Pending Prescriptions Disp Refills    Glucagon (Gvoke HypoPen 2-Pack) 0.5 MG/0.1ML solution auto-injector 0.2 mL 1     Sig: Inject 1 application  under the skin into the appropriate area as directed Daily As Needed (low blood sugar).    Insulin Glargine-yfgn (Semglee, yfgn,) 100 UNIT/ML solution pen-injector 12 mL 0     Sig: Inject 12 Units under the skin into the appropriate area as directed 2 (Two) Times a Day.    Insulin Lispro, 1 Unit Dial, (HumaLOG KwikPen) 100 UNIT/ML solution pen-injector 15 mL 5     Sig: Change from R insulin, take 5-6 u before meals, correction 1:50 for >150, MDD 50 u    Continuous Glucose Sensor (Dexcom G7 Sensor) misc 9 each 3     Sig: Use 1 each Every 10 (Ten) Days.        Pharmacy where request should be sent: [address] [phone] [facility] [phone] FX     Last office visit with prescribing clinician: 10/25/2024   Last telemedicine visit with prescribing clinician: Visit date not found   Next office visit with prescribing clinician: 10/27/2025     Additional details provided by patient: PATIENT'S PHONE IS OUT OF SERVICE FOR CALLS. PATIENT CONTACTED OLDER SISTER THRU FACEBOOK MESSENGER TO CALL IN HIS MEDICATION. HE IS CURRENTLY OUT OF MEDICATION.     Does the patient have less than a 3 day supply:  [x] Yes  [] No    Would you like a call back once the refill request has been completed: [] Yes [x] No    If the office needs to give you a call back, can they leave a voicemail: [] Yes [x] No    Stacie Rubio   06/24/25 15:58 EDT

## 2025-06-25 RX ORDER — INSULIN ASPART 100 [IU]/ML
INJECTION, SOLUTION INTRAVENOUS; SUBCUTANEOUS
Qty: 30 ML | Refills: 3 | Status: SHIPPED | OUTPATIENT
Start: 2025-06-25

## 2025-06-27 ENCOUNTER — TELEPHONE (OUTPATIENT)
Dept: INTERNAL MEDICINE | Facility: CLINIC | Age: 70
End: 2025-06-27
Payer: MEDICARE

## 2025-06-27 RX ORDER — INSULIN GLARGINE-YFGN 100 [IU]/ML
12 INJECTION, SOLUTION SUBCUTANEOUS 2 TIMES DAILY
Qty: 12 ML | Refills: 0 | Status: SHIPPED | OUTPATIENT
Start: 2025-06-27

## 2025-06-27 NOTE — TELEPHONE ENCOUNTER
Pts sibling called in to see if they could talk to someone clinical about his insulin glargine.they are worried because he has been sleepy and wobbly but still dont have the rx for it. Pls advise

## 2025-06-27 NOTE — TELEPHONE ENCOUNTER
Pts kerri Edward called and said that the Lantus that was called [facility] to [facility] is on back order and requested the script be sent somewhere else. Caller was not on the verbal release. She stated [facility] and [facility] was [facility]. Please advise.

## 2025-06-27 NOTE — TELEPHONE ENCOUNTER
Pts kerri called back and wants the Semglee called in to [facility]. They will have the supply on Monday.

## 2025-06-27 NOTE — TELEPHONE ENCOUNTER
Called pt sister Stephanie, left vm that Shirley Hutchison did send in the rx this morning to [facility] in [place] so to check on that rx there.  Advised if they need anything else to call me back.

## 2025-07-01 ENCOUNTER — TELEPHONE (OUTPATIENT)
Dept: INTERNAL MEDICINE | Facility: CLINIC | Age: 70
End: 2025-07-01
Payer: MEDICARE

## 2025-07-01 DIAGNOSIS — E10.65 UNCONTROLLED TYPE 1 DIABETES MELLITUS WITH HYPERGLYCEMIA: Primary | ICD-10-CM

## 2025-07-01 RX ORDER — INSULIN GLARGINE-YFGN 100 [IU]/ML
12 INJECTION, SOLUTION SUBCUTANEOUS 2 TIMES DAILY
Qty: 15 ML | Refills: 0 | Status: SHIPPED | OUTPATIENT
Start: 2025-07-01

## 2025-07-01 NOTE — TELEPHONE ENCOUNTER
Caller: MAITE SHIRLEY    Relationship:     Best call back number: [phone]    Which medication are you concerned about:     Insulin Glargine-yfgn (Semglee, yfgn,) 100 UNIT/ML solution pen-injector     What are your concerns:     HAS BEEN OUT OF MEDICATION OVER A WEEK  IT IS ON BACK ORDER AT [facility]  PLEASE SEND TO [facility]    OUR OFFICE GAVE HIM SAMPLES, BUT [facility] IS STILL ON BACK ORDER    PLEASE SEND PRESCRIPTION TO [facility]    [facility]     PLEASE CALL [place] WHEN THIS HAS BEEN CALLED IN

## 2025-08-01 ENCOUNTER — OFFICE VISIT (OUTPATIENT)
Dept: INTERNAL MEDICINE | Facility: CLINIC | Age: 70
End: 2025-08-01
Payer: MEDICARE

## 2025-08-01 ENCOUNTER — TELEPHONE (OUTPATIENT)
Dept: INTERNAL MEDICINE | Facility: CLINIC | Age: 70
End: 2025-08-01

## 2025-08-01 VITALS
DIASTOLIC BLOOD PRESSURE: 64 MMHG | WEIGHT: 130 LBS | BODY MASS INDEX: 20.4 KG/M2 | OXYGEN SATURATION: 98 % | HEIGHT: 67 IN | HEART RATE: 98 BPM | SYSTOLIC BLOOD PRESSURE: 142 MMHG | TEMPERATURE: 98.5 F

## 2025-08-01 DIAGNOSIS — R09.81 COMPLAINT OF NASAL CONGESTION: ICD-10-CM

## 2025-08-01 DIAGNOSIS — R41.3 MEMORY IMPAIRMENT: ICD-10-CM

## 2025-08-01 DIAGNOSIS — R47.9 SPEECH DISTURBANCE, UNSPECIFIED TYPE: ICD-10-CM

## 2025-08-01 DIAGNOSIS — F17.210 SMOKES 1 PACK OR MORE OF CIGARETTES PER DAY: ICD-10-CM

## 2025-08-01 DIAGNOSIS — E10.65 TYPE 1 DIABETES MELLITUS WITH HYPERGLYCEMIA: Primary | ICD-10-CM

## 2025-08-01 DIAGNOSIS — E78.2 MIXED HYPERLIPIDEMIA: ICD-10-CM

## 2025-08-01 DIAGNOSIS — I10 ESSENTIAL HYPERTENSION: ICD-10-CM

## 2025-08-01 DIAGNOSIS — G25.81 RLS (RESTLESS LEGS SYNDROME): ICD-10-CM

## 2025-08-01 DIAGNOSIS — J45.40 MODERATE PERSISTENT ASTHMA WITHOUT COMPLICATION: ICD-10-CM

## 2025-08-01 DIAGNOSIS — E03.9 ACQUIRED HYPOTHYROIDISM: ICD-10-CM

## 2025-08-01 LAB
EXPIRATION DATE: ABNORMAL
HBA1C MFR BLD: 9.1 % (ref 4.5–5.7)
Lab: ABNORMAL

## 2025-08-01 RX ORDER — ACYCLOVIR 400 MG/1
1 TABLET ORAL
Qty: 9 EACH | Refills: 3 | Status: SHIPPED | OUTPATIENT
Start: 2025-08-01

## 2025-08-01 RX ORDER — FLUTICASONE PROPIONATE 50 MCG
2 SPRAY, SUSPENSION (ML) NASAL DAILY
Qty: 16 G | Refills: 0 | Status: SHIPPED | OUTPATIENT
Start: 2025-08-01

## 2025-08-01 NOTE — TELEPHONE ENCOUNTER
Caller: IJEOMA WITH [facility] SERVICES    Relationship: Other    Best call back number: [phone] OPTION 3     What form or medical record are you requesting: PROGRESS NOTES FROM LAST OFFICE VISIT AND CONTINUOUS GLUCOSE MONITOR FORM SIGNED AND DATED    Who is requesting this form or medical record from you: [facility]    How would you like to receive the form or medical records (pick-up, mail, fax): FAX  If fax, what is the fax number: [phone]    Timeframe paperwork needed: ASAP    Additional notes: FORMS FAXED TO OFFICE ON 07/28/25 AND 07/30/25

## 2025-08-01 NOTE — TELEPHONE ENCOUNTER
Spoke to gibson and told her it will be faxed when note is done, and that patient said he was moving

## 2025-08-02 DIAGNOSIS — G25.81 RLS (RESTLESS LEGS SYNDROME): ICD-10-CM

## 2025-08-02 LAB
T4 SERPL-MCNC: 13.1 UG/DL (ref 4.5–12)
TSH SERPL DL<=0.005 MIU/L-ACNC: 0.05 UIU/ML (ref 0.27–4.2)

## 2025-08-04 RX ORDER — ROPINIROLE 2 MG/1
2 TABLET, FILM COATED ORAL NIGHTLY
Qty: 90 TABLET | Refills: 3 | Status: SHIPPED | OUTPATIENT
Start: 2025-08-04

## 2025-08-04 RX ORDER — LEVOTHYROXINE SODIUM 125 UG/1
125 TABLET ORAL
Qty: 30 TABLET | Refills: 1 | Status: SHIPPED | OUTPATIENT
Start: 2025-08-04

## (undated) DEVICE — VLV SXN AIR/H2O ORCAPOD3 1P/U STRL

## (undated) DEVICE — MEDI-VAC NON-CONDUCTIVE SUCTION TUBING: Brand: CARDINAL HEALTH

## (undated) DEVICE — LUBE JELLY PK/2.75GM STRL BX/144

## (undated) DEVICE — Device

## (undated) DEVICE — GLV SURG SENSICARE W/ALOE PF LF 6.5 STRL